# Patient Record
Sex: FEMALE | Race: WHITE | NOT HISPANIC OR LATINO
[De-identification: names, ages, dates, MRNs, and addresses within clinical notes are randomized per-mention and may not be internally consistent; named-entity substitution may affect disease eponyms.]

---

## 2017-08-17 ENCOUNTER — APPOINTMENT (OUTPATIENT)
Dept: MAMMOGRAPHY | Facility: IMAGING CENTER | Age: 77
End: 2017-08-17
Payer: MEDICARE

## 2017-08-17 ENCOUNTER — OUTPATIENT (OUTPATIENT)
Dept: OUTPATIENT SERVICES | Facility: HOSPITAL | Age: 77
LOS: 1 days | End: 2017-08-17
Payer: MEDICARE

## 2017-08-17 ENCOUNTER — APPOINTMENT (OUTPATIENT)
Dept: ULTRASOUND IMAGING | Facility: IMAGING CENTER | Age: 77
End: 2017-08-17
Payer: MEDICARE

## 2017-08-17 DIAGNOSIS — Z00.8 ENCOUNTER FOR OTHER GENERAL EXAMINATION: ICD-10-CM

## 2017-08-17 PROCEDURE — 77067 SCR MAMMO BI INCL CAD: CPT

## 2017-08-17 PROCEDURE — G0202: CPT | Mod: 26

## 2017-08-17 PROCEDURE — 76641 ULTRASOUND BREAST COMPLETE: CPT | Mod: 26,50

## 2017-08-17 PROCEDURE — 77063 BREAST TOMOSYNTHESIS BI: CPT | Mod: 26

## 2017-08-17 PROCEDURE — 77063 BREAST TOMOSYNTHESIS BI: CPT

## 2017-08-17 PROCEDURE — 76641 ULTRASOUND BREAST COMPLETE: CPT

## 2018-07-05 ENCOUNTER — MEDICATION RENEWAL (OUTPATIENT)
Age: 78
End: 2018-07-05

## 2018-08-17 ENCOUNTER — RECORD ABSTRACTING (OUTPATIENT)
Age: 78
End: 2018-08-17

## 2018-09-13 ENCOUNTER — OUTPATIENT (OUTPATIENT)
Dept: OUTPATIENT SERVICES | Facility: HOSPITAL | Age: 78
LOS: 1 days | End: 2018-09-13
Payer: MEDICARE

## 2018-09-13 ENCOUNTER — APPOINTMENT (OUTPATIENT)
Dept: ULTRASOUND IMAGING | Facility: IMAGING CENTER | Age: 78
End: 2018-09-13
Payer: MEDICARE

## 2018-09-13 ENCOUNTER — APPOINTMENT (OUTPATIENT)
Dept: MAMMOGRAPHY | Facility: IMAGING CENTER | Age: 78
End: 2018-09-13
Payer: MEDICARE

## 2018-09-13 DIAGNOSIS — Z00.8 ENCOUNTER FOR OTHER GENERAL EXAMINATION: ICD-10-CM

## 2018-09-13 PROCEDURE — 77067 SCR MAMMO BI INCL CAD: CPT | Mod: 26

## 2018-09-13 PROCEDURE — 76641 ULTRASOUND BREAST COMPLETE: CPT

## 2018-09-13 PROCEDURE — 76641 ULTRASOUND BREAST COMPLETE: CPT | Mod: 26,50

## 2018-09-13 PROCEDURE — 77063 BREAST TOMOSYNTHESIS BI: CPT | Mod: 26

## 2018-09-13 PROCEDURE — 77067 SCR MAMMO BI INCL CAD: CPT

## 2018-09-13 PROCEDURE — 77063 BREAST TOMOSYNTHESIS BI: CPT

## 2018-09-14 ENCOUNTER — APPOINTMENT (OUTPATIENT)
Dept: PULMONOLOGY | Facility: CLINIC | Age: 78
End: 2018-09-14

## 2018-10-24 ENCOUNTER — APPOINTMENT (OUTPATIENT)
Dept: PULMONOLOGY | Facility: CLINIC | Age: 78
End: 2018-10-24
Payer: MEDICARE

## 2018-10-24 VITALS
WEIGHT: 119 LBS | HEIGHT: 63.5 IN | TEMPERATURE: 98.2 F | SYSTOLIC BLOOD PRESSURE: 144 MMHG | OXYGEN SATURATION: 93 % | RESPIRATION RATE: 12 BRPM | HEART RATE: 85 BPM | DIASTOLIC BLOOD PRESSURE: 78 MMHG | BODY MASS INDEX: 20.82 KG/M2

## 2018-10-24 PROCEDURE — 88738 HGB QUANT TRANSCUTANEOUS: CPT

## 2018-10-24 PROCEDURE — 94727 GAS DIL/WSHOT DETER LNG VOL: CPT

## 2018-10-24 PROCEDURE — 94060 EVALUATION OF WHEEZING: CPT

## 2018-10-24 PROCEDURE — 94729 DIFFUSING CAPACITY: CPT

## 2018-10-24 PROCEDURE — 99213 OFFICE O/P EST LOW 20 MIN: CPT | Mod: 25

## 2018-10-24 RX ORDER — ALBUTEROL SULFATE 90 UG/1
108 (90 BASE) AEROSOL, METERED RESPIRATORY (INHALATION)
Qty: 1 | Refills: 5 | Status: DISCONTINUED | COMMUNITY
Start: 2018-07-05 | End: 2018-10-24

## 2018-10-25 LAB — POCT - HEMOGLOBIN (HGB), QUANTITATIVE, TRANSCUTANEOUS: 13.1

## 2018-11-27 ENCOUNTER — RX RENEWAL (OUTPATIENT)
Age: 78
End: 2018-11-27

## 2019-03-29 ENCOUNTER — APPOINTMENT (OUTPATIENT)
Dept: PULMONOLOGY | Facility: CLINIC | Age: 79
End: 2019-03-29
Payer: MEDICARE

## 2019-03-29 VITALS
SYSTOLIC BLOOD PRESSURE: 138 MMHG | OXYGEN SATURATION: 97 % | TEMPERATURE: 97.8 F | HEART RATE: 81 BPM | RESPIRATION RATE: 14 BRPM | DIASTOLIC BLOOD PRESSURE: 82 MMHG

## 2019-03-29 PROCEDURE — 99213 OFFICE O/P EST LOW 20 MIN: CPT

## 2019-03-29 NOTE — HISTORY OF PRESENT ILLNESS
[FreeTextEntry1] : Difficult with stomach flu and also with allergies. \par \par Occ cough.\par Baseline CAMARENA.\par No wheeze\par \par Rare Beta agonist use.

## 2019-03-29 NOTE — PHYSICAL EXAM
[Jugular Venous Distention Increased] : there was no jugular-venous distention [Abdomen Soft] : soft [Abdomen Tenderness] : non-tender [Abdomen Mass (___ Cm)] : no abdominal mass palpated [Nail Clubbing] : no clubbing of the fingernails [Cyanosis, Localized] : no localized cyanosis [Petechial Hemorrhages (___cm)] : no petechial hemorrhages [] : no ischemic changes [Lungs Percussion] : the lungs were normal to percussion [FreeTextEntry1] : Decreased bilat. Rare wheeze.

## 2019-03-31 LAB
ALBUMIN SERPL ELPH-MCNC: 4.1 G/DL
ALP BLD-CCNC: 92 U/L
ALT SERPL-CCNC: 25 U/L
AST SERPL-CCNC: 36 U/L
BILIRUB DIRECT SERPL-MCNC: 0.1 MG/DL
BILIRUB INDIRECT SERPL-MCNC: 0.2 MG/DL
BILIRUB SERPL-MCNC: 0.3 MG/DL
CHOLEST SERPL-MCNC: 162 MG/DL
CHOLEST/HDLC SERPL: 2.4 RATIO
CK SERPL-CCNC: 83 U/L
HDLC SERPL-MCNC: 68 MG/DL
LDLC SERPL CALC-MCNC: 84 MG/DL
PROT SERPL-MCNC: 6.6 G/DL
TRIGL SERPL-MCNC: 52 MG/DL

## 2019-07-16 ENCOUNTER — MEDICATION RENEWAL (OUTPATIENT)
Age: 79
End: 2019-07-16

## 2019-09-03 LAB
CHOLEST SERPL-MCNC: 176 MG/DL
CHOLEST/HDLC SERPL: 2.5 RATIO
HDLC SERPL-MCNC: 71 MG/DL
LDLC SERPL CALC-MCNC: 95 MG/DL
TRIGL SERPL-MCNC: 51 MG/DL

## 2019-09-17 ENCOUNTER — OUTPATIENT (OUTPATIENT)
Dept: OUTPATIENT SERVICES | Facility: HOSPITAL | Age: 79
LOS: 1 days | End: 2019-09-17
Payer: MEDICARE

## 2019-09-17 ENCOUNTER — APPOINTMENT (OUTPATIENT)
Dept: MAMMOGRAPHY | Facility: IMAGING CENTER | Age: 79
End: 2019-09-17
Payer: MEDICARE

## 2019-09-17 ENCOUNTER — APPOINTMENT (OUTPATIENT)
Dept: ULTRASOUND IMAGING | Facility: IMAGING CENTER | Age: 79
End: 2019-09-17
Payer: MEDICARE

## 2019-09-17 DIAGNOSIS — Z00.8 ENCOUNTER FOR OTHER GENERAL EXAMINATION: ICD-10-CM

## 2019-09-17 PROCEDURE — 77067 SCR MAMMO BI INCL CAD: CPT | Mod: 26

## 2019-09-17 PROCEDURE — 76641 ULTRASOUND BREAST COMPLETE: CPT | Mod: 26,50

## 2019-09-17 PROCEDURE — 77063 BREAST TOMOSYNTHESIS BI: CPT | Mod: 26

## 2019-09-17 PROCEDURE — 76641 ULTRASOUND BREAST COMPLETE: CPT

## 2019-09-17 PROCEDURE — 77067 SCR MAMMO BI INCL CAD: CPT

## 2019-09-17 PROCEDURE — 77063 BREAST TOMOSYNTHESIS BI: CPT

## 2019-10-01 ENCOUNTER — RX RENEWAL (OUTPATIENT)
Age: 79
End: 2019-10-01

## 2019-10-02 ENCOUNTER — MEDICATION RENEWAL (OUTPATIENT)
Age: 79
End: 2019-10-02

## 2019-10-02 ENCOUNTER — TRANSCRIPTION ENCOUNTER (OUTPATIENT)
Age: 79
End: 2019-10-02

## 2019-10-02 RX ORDER — ALPRAZOLAM 0.5 MG/1
0.5 TABLET ORAL
Qty: 30 | Refills: 0 | Status: ACTIVE | COMMUNITY
Start: 2019-10-02 | End: 1900-01-01

## 2019-10-04 ENCOUNTER — TRANSCRIPTION ENCOUNTER (OUTPATIENT)
Age: 79
End: 2019-10-04

## 2019-10-05 ENCOUNTER — APPOINTMENT (OUTPATIENT)
Dept: ORTHOPEDIC SURGERY | Facility: CLINIC | Age: 79
End: 2019-10-05
Payer: MEDICARE

## 2019-10-05 VITALS
WEIGHT: 118 LBS | SYSTOLIC BLOOD PRESSURE: 179 MMHG | BODY MASS INDEX: 20.91 KG/M2 | DIASTOLIC BLOOD PRESSURE: 89 MMHG | HEIGHT: 63 IN | HEART RATE: 76 BPM

## 2019-10-05 DIAGNOSIS — Z87.898 PERSONAL HISTORY OF OTHER SPECIFIED CONDITIONS: ICD-10-CM

## 2019-10-05 PROCEDURE — 99203 OFFICE O/P NEW LOW 30 MIN: CPT

## 2019-10-05 NOTE — HISTORY OF PRESENT ILLNESS
[de-identified] : This is a 79 y.o. RHD who presents with c/o left elbow pain and swelling x 6 days.  Patient does not recall injury.  She developed increasing swelling and redness at the elbow and went to Urgent Care 2 days ago.  She was put on Doxycycline, but it made nauseous.  She was given something for the nausea, but she stopped the antibiotics anyway.  She doesn't know how many pills she was given.  They helped a little.  Nonetheless, she is very trouble by the swelling at the elbow and is hoping to have it drained.

## 2019-10-05 NOTE — PHYSICAL EXAM
[Normal RUE] : Right Upper Extremity: No scars, rashes, lesions, ulcers, skin intact [Normal] : no peripheral adenopathy appreciated [de-identified] : Left elbow:\par + olecranon effusion\par + mild pitting edema and induration distal to elbow joint (patient states that is improved)\par skin intact\par + erythema\par + mildly tender to palpation\par Pain at full flexion\par FROM\par full ROM of wrist and hand with intact sensation and 2 + radial pulse\par \par Right elbow: no deformity, no swelling, NT to palpation, FROM, 5/5 motor, sensation intact to LT, 2 + radial pulse [de-identified] : no swelling, no edema, skin warm and well-perfused  [de-identified] : Normal rate, no increased respiratory effort, no use of accessory muscles, no nasal flaring  [de-identified] : deferred

## 2019-10-05 NOTE — DISCUSSION/SUMMARY
[de-identified] : The pathophysiology and anatomy were reviewed in detail with the patient, who expressed understanding. Patient will apply warm compresses to elbow, use Hibiclens soap and keep area moisturized. An Rx for minocycline was sent to her pharmacy along with an Rx for zofran.  She will take probiotics. She will see Dr. Dawkins this week for f/u in [place].  She was instructed to go to the ER for worsening sxs. All questions were answered to patient's satisfaction.

## 2019-10-07 ENCOUNTER — APPOINTMENT (OUTPATIENT)
Dept: ORTHOPEDIC SURGERY | Facility: CLINIC | Age: 79
End: 2019-10-07

## 2019-10-10 ENCOUNTER — APPOINTMENT (OUTPATIENT)
Dept: ORTHOPEDIC SURGERY | Facility: CLINIC | Age: 79
End: 2019-10-10
Payer: MEDICARE

## 2019-10-10 ENCOUNTER — LABORATORY RESULT (OUTPATIENT)
Age: 79
End: 2019-10-10

## 2019-10-10 VITALS — WEIGHT: 118 LBS | HEIGHT: 63 IN | BODY MASS INDEX: 20.91 KG/M2

## 2019-10-10 PROCEDURE — 20605 DRAIN/INJ JOINT/BURSA W/O US: CPT | Mod: LT

## 2019-10-10 PROCEDURE — 99214 OFFICE O/P EST MOD 30 MIN: CPT | Mod: 25

## 2019-10-10 NOTE — HISTORY OF PRESENT ILLNESS
[de-identified] : 79 year old female presents for an evaluation of left elbow pain that began on 9/30/2019 when she noted swelling and pain about the posterior aspect of her left elbow. She cannot attribute her pain to any specific injury or event. The patient rates her pain a 6/10 and describes an aching pain about her left elbow that is constant in nature. She notes that the swelling has mildly dissipated over time, but she continues to experience swelling. Her symptoms are exacerbated with leaning on the elbow. She has been taking minocycline.

## 2019-10-10 NOTE — END OF VISIT
[FreeTextEntry3] : All medical record entries made by the Scribe were at my, Dr. Jonnathan Dawkins, direction and personally dictated by me on 10/10/2019. I have reviewed the chart and agree that the record accurately reflects my personal performance of the history, physical exam, assessment and plan. I have also personally directed, reviewed, and agreed with the chart.

## 2019-10-10 NOTE — ADDENDUM
[FreeTextEntry1] : I, Sisi Miramontes, acted solely as a scribe for Dr. Jonnathan Dawkins on this date 10/10/2019.

## 2019-10-10 NOTE — PHYSICAL EXAM
[Normal LUE] : Left Upper Extremity: No scars, rashes, lesions, ulcers, skin intact [Normal Touch] : sensation intact for touch [Normal] : No swelling, no edema, normal pedal pulses and normal temperature [Poor Appearance] : well-appearing [Acute Distress] : not in acute distress [Obese] : not obese [de-identified] : Left Upper Extremity\par o Elbow :\par ¦ Inspection/Palpation : no tenderness, swelling with 1+ bursal effusion, no deformities\par ¦ Range of Motion : full and painless in all planes, no crepitus\par ¦ Strength : flexion and extension 5/5\par ¦ Stability : no joint instability on provocative testing\par o Muscle Bulk : no atrophy\par o Sensation : sensation intact to light touch\par o Skin : no skin lesions, no discoloration\par o Vascular Exam : no edema, no cyanosis, radial and ulnar pulses normal

## 2019-10-10 NOTE — DISCUSSION/SUMMARY
[de-identified] : The underlying pathophysiology was reviewed in great detail with the patient as well as the various treatment options, including ice, analgesics, NSAIDs, Physical therapy, steroid injections. \par \par The patient wishes to proceed with an ASPIRATION of the left elbow. A sample of aspirated fluid was sent for lab analysis. \par \par FU 1 week.

## 2019-10-17 ENCOUNTER — APPOINTMENT (OUTPATIENT)
Dept: ORTHOPEDIC SURGERY | Facility: CLINIC | Age: 79
End: 2019-10-17
Payer: MEDICARE

## 2019-10-17 PROCEDURE — 20605 DRAIN/INJ JOINT/BURSA W/O US: CPT | Mod: LT

## 2019-10-17 PROCEDURE — 99213 OFFICE O/P EST LOW 20 MIN: CPT | Mod: 25

## 2019-10-17 NOTE — ADDENDUM
[FreeTextEntry1] : I, Sisi Miramontes, acted solely as a scribe for Dr. Jonnathan Dawkins on this date 10/17/2019.

## 2019-10-17 NOTE — PROCEDURE
[de-identified] : At this point I recommended aspiration and therapeutic injection and under sterile precautions an injection of 0.25 cc 1% lidocaine with 0.25 cc of Kenalog and 0.25 cc of Dexamethasone- was placed into the olecranon bursa of the Left elbow without complication after 5 cc of bloody synovial fluid was aspirated and after several minutes the patient felt significant relief.

## 2019-10-17 NOTE — END OF VISIT
[FreeTextEntry3] : All medical record entries made by the Scribe were at my, Dr. Jonnathan Dawkins, direction and personally dictated by me on 10/17/2019. I have reviewed the chart and agree that the record accurately reflects my personal performance of the history, physical exam, assessment and plan. I have also personally directed, reviewed, and agreed with the chart.

## 2019-10-17 NOTE — DISCUSSION/SUMMARY
[de-identified] : The underlying pathophysiology was reviewed in great detail with the patient as well as the various treatment options, including ice, analgesics, NSAIDs, Physical therapy, steroid injections. \par \par The patient wishes to proceed with an ASPIRATION and INJECTION of the left elbow.\par \par FU PRN.

## 2019-10-17 NOTE — HISTORY OF PRESENT ILLNESS
[de-identified] : 79 year old female presents for an evaluation of left elbow pain that began on 9/30/2019 when she noted swelling and pain about the posterior aspect of her left elbow. She cannot attribute her pain to any specific injury or event. At her last visit on 10/10/2019 she underwent an aspiration of her left elbow which greatly alleviated her symptoms. The aspirated fluid was sent for lab analysis and the Gram stain showed no growth. The patient notes that the swelling about the posterior aspect of her left elbow has since returned and she has been experiencing sensitivity about her left elbow that is exacerbated with leaning on the elbow. She has completed her course of Minocycline.

## 2019-10-17 NOTE — PHYSICAL EXAM
[Normal Touch] : sensation intact for touch [Normal LUE] : Left Upper Extremity: No scars, rashes, lesions, ulcers, skin intact [Normal] : No swelling, no edema, normal pedal pulses and normal temperature [Poor Appearance] : well-appearing [Acute Distress] : not in acute distress [Obese] : not obese [de-identified] : Left Upper Extremity\par o Elbow :\par ¦ Inspection/Palpation : no tenderness, swelling with 1+ bursal effusion, no deformities\par ¦ Range of Motion : full and painless in all planes, no crepitus\par ¦ Strength : flexion and extension 5/5\par ¦ Stability : no joint instability on provocative testing\par o Muscle Bulk : no atrophy\par o Sensation : sensation intact to light touch\par o Skin : no skin lesions, resolved erythema about the posterior elbow\par o Vascular Exam : no edema, no cyanosis, radial and ulnar pulses normal

## 2019-10-24 ENCOUNTER — APPOINTMENT (OUTPATIENT)
Dept: ORTHOPEDIC SURGERY | Facility: CLINIC | Age: 79
End: 2019-10-24
Payer: MEDICARE

## 2019-10-24 VITALS — BODY MASS INDEX: 20.91 KG/M2 | WEIGHT: 118 LBS | HEIGHT: 63 IN

## 2019-10-24 DIAGNOSIS — M70.22 OLECRANON BURSITIS, LEFT ELBOW: ICD-10-CM

## 2019-10-24 PROCEDURE — 99213 OFFICE O/P EST LOW 20 MIN: CPT

## 2019-10-24 NOTE — ADDENDUM
[FreeTextEntry1] : I, Sisi Miramontes, acted solely as a scribe for Dr. Jonnathan Dawkins on this date 10/24/2019.

## 2019-10-24 NOTE — END OF VISIT
[FreeTextEntry3] : All medical record entries made by the Scribe were at my, Dr. Jonnathan Dawkins, direction and personally dictated by me on 10/24/2019. I have reviewed the chart and agree that the record accurately reflects my personal performance of the history, physical exam, assessment and plan. I have also personally directed, reviewed, and agreed with the chart.

## 2019-10-24 NOTE — HISTORY OF PRESENT ILLNESS
[de-identified] : 79 year old female presents for an evaluation of left elbow pain that began on 9/30/2019 when she noted swelling and pain about the posterior aspect of her left elbow. She cannot attribute her pain to any specific injury or event. At her last visit on 10/17/2019 she received an aspiration and corticosteroid injection of the left elbow which greatly alleviated her symptoms. The patient reports that the swelling about her left elbow has greatly improved, but she continues to experience moderate sensitivity upon leaning on the elbow. She has no other complaints at this time.

## 2019-10-24 NOTE — PHYSICAL EXAM
[Normal LUE] : Left Upper Extremity: No scars, rashes, lesions, ulcers, skin intact [Normal Touch] : sensation intact for touch [Normal] : No swelling, no edema, normal pedal pulses and normal temperature [Poor Appearance] : well-appearing [Acute Distress] : not in acute distress [de-identified] : Left Upper Extremity\par o Elbow :\par ¦ Inspection/Palpation : no tenderness, no swelling, no deformities\par ¦ Range of Motion : full and painless in all planes, no crepitus\par ¦ Strength : flexion and extension 5/5\par ¦ Stability : no joint instability on provocative testing\par o Muscle Bulk : no atrophy\par o Sensation : sensation intact to light touch\par o Skin : no skin lesions, resolved erythema about the posterior elbow\par o Vascular Exam : no edema, no cyanosis, radial and ulnar pulses normal  [Obese] : not obese

## 2019-10-24 NOTE — DISCUSSION/SUMMARY
[de-identified] : The underlying pathophysiology was reviewed in great detail with the patient as well as the various treatment options, including ice, analgesics, NSAIDs, Physical therapy, steroid injections. \par \par Activity modifications and restrictions were discussed. \par \par FU PRN.

## 2019-11-06 ENCOUNTER — APPOINTMENT (OUTPATIENT)
Dept: CARDIOLOGY | Facility: CLINIC | Age: 79
End: 2019-11-06
Payer: MEDICARE

## 2019-11-06 ENCOUNTER — LABORATORY RESULT (OUTPATIENT)
Age: 79
End: 2019-11-06

## 2019-11-06 ENCOUNTER — NON-APPOINTMENT (OUTPATIENT)
Age: 79
End: 2019-11-06

## 2019-11-06 VITALS
HEART RATE: 90 BPM | HEIGHT: 63 IN | WEIGHT: 118 LBS | DIASTOLIC BLOOD PRESSURE: 90 MMHG | BODY MASS INDEX: 20.91 KG/M2 | SYSTOLIC BLOOD PRESSURE: 150 MMHG | TEMPERATURE: 98.1 F | OXYGEN SATURATION: 98 %

## 2019-11-06 VITALS — DIASTOLIC BLOOD PRESSURE: 84 MMHG | SYSTOLIC BLOOD PRESSURE: 148 MMHG

## 2019-11-06 VITALS — SYSTOLIC BLOOD PRESSURE: 130 MMHG | DIASTOLIC BLOOD PRESSURE: 80 MMHG

## 2019-11-06 DIAGNOSIS — L03.119 CELLULITIS OF UNSPECIFIED PART OF LIMB: ICD-10-CM

## 2019-11-06 DIAGNOSIS — Z13.820 ENCOUNTER FOR SCREENING FOR OSTEOPOROSIS: ICD-10-CM

## 2019-11-06 DIAGNOSIS — Z23 ENCOUNTER FOR IMMUNIZATION: ICD-10-CM

## 2019-11-06 DIAGNOSIS — F41.9 ANXIETY DISORDER, UNSPECIFIED: ICD-10-CM

## 2019-11-06 DIAGNOSIS — Z12.83 ENCOUNTER FOR SCREENING FOR MALIGNANT NEOPLASM OF SKIN: ICD-10-CM

## 2019-11-06 DIAGNOSIS — D89.2 HYPERGAMMAGLOBULINEMIA, UNSPECIFIED: ICD-10-CM

## 2019-11-06 DIAGNOSIS — Z71.89 OTHER SPECIFIED COUNSELING: ICD-10-CM

## 2019-11-06 DIAGNOSIS — E78.5 HYPERLIPIDEMIA, UNSPECIFIED: ICD-10-CM

## 2019-11-06 DIAGNOSIS — I10 ESSENTIAL (PRIMARY) HYPERTENSION: ICD-10-CM

## 2019-11-06 DIAGNOSIS — Z13.228 ENCOUNTER FOR SCREENING FOR OTHER METABOLIC DISORDERS: ICD-10-CM

## 2019-11-06 DIAGNOSIS — Z00.00 ENCOUNTER FOR GENERAL ADULT MEDICAL EXAMINATION W/OUT ABNORMAL FINDINGS: ICD-10-CM

## 2019-11-06 DIAGNOSIS — J44.9 CHRONIC OBSTRUCTIVE PULMONARY DISEASE, UNSPECIFIED: ICD-10-CM

## 2019-11-06 DIAGNOSIS — Z12.39 ENCOUNTER FOR OTHER SCREENING FOR MALIGNANT NEOPLASM OF BREAST: ICD-10-CM

## 2019-11-06 DIAGNOSIS — Z12.11 ENCOUNTER FOR SCREENING FOR MALIGNANT NEOPLASM OF COLON: ICD-10-CM

## 2019-11-06 PROCEDURE — 99214 OFFICE O/P EST MOD 30 MIN: CPT

## 2019-11-06 PROCEDURE — 93000 ELECTROCARDIOGRAM COMPLETE: CPT

## 2019-11-06 RX ORDER — SPIRONOLACTONE 25 MG/1
25 TABLET ORAL
Refills: 0 | Status: ACTIVE | COMMUNITY

## 2019-11-06 RX ORDER — VERAPAMIL HYDROCHLORIDE 120 MG/1
120 TABLET ORAL AT BEDTIME
Refills: 0 | Status: ACTIVE | COMMUNITY
Start: 2019-11-06

## 2019-11-06 RX ORDER — VERAPAMIL HYDROCHLORIDE 240 MG/1
240 TABLET ORAL
Refills: 0 | Status: ACTIVE | COMMUNITY
Start: 2019-11-06

## 2019-11-06 RX ORDER — OLMESARTAN MEDOXOMIL 40 MG/1
40 TABLET, FILM COATED ORAL DAILY
Qty: 14 | Refills: 0 | Status: ACTIVE | COMMUNITY
Start: 2019-11-06

## 2019-11-06 RX ORDER — ZOLPIDEM TARTRATE 5 MG/1
5 TABLET ORAL
Qty: 30 | Refills: 0 | Status: ACTIVE | COMMUNITY
Start: 2019-11-06 | End: 1900-01-01

## 2019-11-06 NOTE — HISTORY OF PRESENT ILLNESS
[FreeTextEntry1] : Annual Wellness Exam  [de-identified] : This is a 79 year old lady with a PMH of HTN, HLD, Asthma and Anxiety presents today for her annual wellness exam and to establish care at our office. Patient states that she has been under a significant amount of stress as her  has passed away within the year, otherwise patient states that she is good and has no complaints at this time. Patient was diagnosed with Cellulitis in the arm and has been being treated by Orthopedics. Patient is s/p Antibiotics and is feeling good. Patient denies dyspnea, palpitations, chest pain, nausea, vomiting, dizziness and lightheadedness.\par

## 2019-11-17 LAB
25(OH)D3 SERPL-MCNC: 49.4 NG/ML
ALBUMIN MFR SERPL ELPH: 57.4 %
ALBUMIN SERPL-MCNC: 4 G/DL
ALBUMIN/GLOB SERPL: 1.4 RATIO
ALPHA1 GLOB MFR SERPL ELPH: 4.4 %
ALPHA1 GLOB SERPL ELPH-MCNC: 0.3 G/DL
ALPHA2 GLOB MFR SERPL ELPH: 13 %
ALPHA2 GLOB SERPL ELPH-MCNC: 0.9 G/DL
ANION GAP SERPL CALC-SCNC: 11 MMOL/L
APPEARANCE: ABNORMAL
B-GLOBULIN MFR SERPL ELPH: 12 %
B-GLOBULIN SERPL ELPH-MCNC: 0.8 G/DL
BACTERIA: ABNORMAL
BASOPHILS # BLD AUTO: 0.03 K/UL
BASOPHILS NFR BLD AUTO: 0.5 %
BILIRUBIN URINE: NEGATIVE
BLOOD URINE: NEGATIVE
BUN SERPL-MCNC: 34 MG/DL
CALCIUM SERPL-MCNC: 10.2 MG/DL
CHLORIDE SERPL-SCNC: 101 MMOL/L
CHOLEST SERPL-MCNC: 179 MG/DL
CHOLEST/HDLC SERPL: 2.5 RATIO
CO2 SERPL-SCNC: 26 MMOL/L
COLOR: ABNORMAL
CREAT SERPL-MCNC: 1.49 MG/DL
CREAT SPEC-SCNC: 169 MG/DL
EOSINOPHIL # BLD AUTO: 0.2 K/UL
EOSINOPHIL NFR BLD AUTO: 3.3 %
ESTIMATED AVERAGE GLUCOSE: 105 MG/DL
GAMMA GLOB FLD ELPH-MCNC: 0.9 G/DL
GAMMA GLOB MFR SERPL ELPH: 13.2 %
GLUCOSE QUALITATIVE U: NEGATIVE
GLUCOSE SERPL-MCNC: 95 MG/DL
HBA1C MFR BLD HPLC: 5.3 %
HCT VFR BLD CALC: 42.6 %
HDLC SERPL-MCNC: 71 MG/DL
HGB BLD-MCNC: 13.5 G/DL
HYALINE CASTS: 0 /LPF
IGA 24H UR QL IFE: NORMAL
IMM GRANULOCYTES NFR BLD AUTO: 0.2 %
INTERPRETATION SERPL IEP-IMP: NORMAL
KETONES URINE: NEGATIVE
LDLC SERPL CALC-MCNC: 95 MG/DL
LEUKOCYTE ESTERASE URINE: NEGATIVE
LYMPHOCYTES # BLD AUTO: 0.9 K/UL
LYMPHOCYTES NFR BLD AUTO: 14.9 %
M PROTEIN MFR SERPL ELPH: 8.7 %
M PROTEIN SPEC IFE-MCNC: NORMAL
MAGNESIUM SERPL-MCNC: 2.3 MG/DL
MAN DIFF?: NORMAL
MCHC RBC-ENTMCNC: 30.8 PG
MCHC RBC-ENTMCNC: 31.7 GM/DL
MCV RBC AUTO: 97.3 FL
MICROALBUMIN 24H UR DL<=1MG/L-MCNC: 3.5 MG/DL
MICROALBUMIN/CREAT 24H UR-RTO: 21 MG/G
MICROSCOPIC-UA: NORMAL
MONOCLON BAND OBS SERPL: 0.6 G/DL
MONOCYTES # BLD AUTO: 0.63 K/UL
MONOCYTES NFR BLD AUTO: 10.4 %
NEUTROPHILS # BLD AUTO: 4.26 K/UL
NEUTROPHILS NFR BLD AUTO: 70.7 %
NITRITE URINE: NEGATIVE
PH URINE: 5.5
PHOSPHATE SERPL-MCNC: 3.4 MG/DL
PLATELET # BLD AUTO: 208 K/UL
POTASSIUM SERPL-SCNC: 5.3 MMOL/L
PROT SERPL-MCNC: 6.9 G/DL
PROT SERPL-MCNC: 6.9 G/DL
PROTEIN URINE: NORMAL
RBC # BLD: 4.38 M/UL
RBC # FLD: 14.5 %
RED BLOOD CELLS URINE: 2 /HPF
SODIUM SERPL-SCNC: 138 MMOL/L
SPECIFIC GRAVITY URINE: 1.02
SQUAMOUS EPITHELIAL CELLS: 5 /HPF
T3RU NFR SERPL: 0.9 TBI
T4 FREE SERPL-MCNC: 1.3 NG/DL
T4 SERPL-MCNC: 8.4 UG/DL
TRIGL SERPL-MCNC: 63 MG/DL
TSH SERPL-ACNC: 1.78 UIU/ML
URATE SERPL-MCNC: 5.7 MG/DL
UROBILINOGEN URINE: NORMAL
WBC # FLD AUTO: 6.03 K/UL
WHITE BLOOD CELLS URINE: 5 /HPF

## 2019-11-19 ENCOUNTER — RX RENEWAL (OUTPATIENT)
Age: 79
End: 2019-11-19

## 2019-11-22 ENCOUNTER — APPOINTMENT (OUTPATIENT)
Dept: PULMONOLOGY | Facility: CLINIC | Age: 79
End: 2019-11-22

## 2019-11-22 ENCOUNTER — APPOINTMENT (OUTPATIENT)
Dept: PULMONOLOGY | Facility: CLINIC | Age: 79
End: 2019-11-22
Payer: MEDICARE

## 2019-11-22 VITALS
SYSTOLIC BLOOD PRESSURE: 146 MMHG | RESPIRATION RATE: 16 BRPM | TEMPERATURE: 98 F | HEART RATE: 77 BPM | HEIGHT: 63 IN | BODY MASS INDEX: 20.91 KG/M2 | WEIGHT: 118 LBS | OXYGEN SATURATION: 97 % | DIASTOLIC BLOOD PRESSURE: 80 MMHG

## 2019-11-22 DIAGNOSIS — J44.9 CHRONIC OBSTRUCTIVE PULMONARY DISEASE, UNSPECIFIED: ICD-10-CM

## 2019-11-22 PROCEDURE — 99213 OFFICE O/P EST LOW 20 MIN: CPT

## 2019-11-22 NOTE — HISTORY OF PRESENT ILLNESS
[FreeTextEntry1] : moving to [place] got flu shot\par \par rare cough. Breo prn\par Baseline CAMARENA.\par No wheeze\par \par Rare Beta agonist use.

## 2019-11-22 NOTE — PHYSICAL EXAM
[Jugular Venous Distention Increased] : there was no jugular-venous distention [Lungs Percussion] : the lungs were normal to percussion [Abdomen Soft] : soft [Abdomen Tenderness] : non-tender [Abdomen Mass (___ Cm)] : no abdominal mass palpated [Nail Clubbing] : no clubbing of the fingernails [Cyanosis, Localized] : no localized cyanosis [Petechial Hemorrhages (___cm)] : no petechial hemorrhages [] : no ischemic changes [FreeTextEntry1] : Decreased bilat. No wheeze.

## 2019-12-13 ENCOUNTER — MEDICATION RENEWAL (OUTPATIENT)
Age: 79
End: 2019-12-13

## 2019-12-13 RX ORDER — PITAVASTATIN CALCIUM 1.04 MG/1
1 TABLET, FILM COATED ORAL DAILY
Qty: 90 | Refills: 3 | Status: ACTIVE | COMMUNITY
Start: 2019-03-29 | End: 1900-01-01

## 2020-02-10 RX ORDER — FLUTICASONE FUROATE AND VILANTEROL TRIFENATATE 200; 25 UG/1; UG/1
200-25 POWDER RESPIRATORY (INHALATION) DAILY
Qty: 1 | Refills: 5 | Status: ACTIVE | COMMUNITY
Start: 2018-07-05 | End: 1900-01-01

## 2020-03-06 RX ORDER — MONTELUKAST 10 MG/1
10 TABLET, FILM COATED ORAL
Qty: 90 | Refills: 3 | Status: ACTIVE | COMMUNITY
Start: 2018-07-05 | End: 1900-01-01

## 2021-08-16 ENCOUNTER — RX RENEWAL (OUTPATIENT)
Age: 81
End: 2021-08-16

## 2021-08-16 RX ORDER — ALBUTEROL SULFATE 90 UG/1
108 (90 BASE) AEROSOL, METERED RESPIRATORY (INHALATION)
Qty: 1 | Refills: 4 | Status: ACTIVE | COMMUNITY
Start: 2019-03-29 | End: 1900-01-01

## 2022-01-13 NOTE — PROCEDURE
[de-identified] : At this point I recommended aspiration and under sterile precautions an injection of 2 cc 1% lidocaine -was placed into the olecranon bursa of the Left elbow without complication and bloody synovial fluid was aspirated: 5 cc total fluid removed. 
no

## 2023-05-25 ENCOUNTER — INPATIENT (INPATIENT)
Facility: HOSPITAL | Age: 83
LOS: 7 days | Discharge: HOME HEALTH | DRG: 189 | End: 2023-06-02
Attending: GENERAL ACUTE CARE HOSPITAL | Admitting: STUDENT IN AN ORGANIZED HEALTH CARE EDUCATION/TRAINING PROGRAM
Payer: MEDICARE

## 2023-05-25 VITALS
HEART RATE: 95 BPM | TEMPERATURE: 100 F | DIASTOLIC BLOOD PRESSURE: 83 MMHG | SYSTOLIC BLOOD PRESSURE: 150 MMHG | HEIGHT: 64 IN | WEIGHT: 119.93 LBS | RESPIRATION RATE: 20 BRPM | OXYGEN SATURATION: 96 %

## 2023-05-25 DIAGNOSIS — I10 ESSENTIAL (PRIMARY) HYPERTENSION: ICD-10-CM

## 2023-05-25 DIAGNOSIS — N17.9 ACUTE KIDNEY FAILURE, UNSPECIFIED: ICD-10-CM

## 2023-05-25 DIAGNOSIS — R63.8 OTHER SYMPTOMS AND SIGNS CONCERNING FOOD AND FLUID INTAKE: ICD-10-CM

## 2023-05-25 DIAGNOSIS — R41.3 OTHER AMNESIA: ICD-10-CM

## 2023-05-25 DIAGNOSIS — J43.9 EMPHYSEMA, UNSPECIFIED: ICD-10-CM

## 2023-05-25 DIAGNOSIS — J44.1 CHRONIC OBSTRUCTIVE PULMONARY DISEASE WITH (ACUTE) EXACERBATION: ICD-10-CM

## 2023-05-25 DIAGNOSIS — E78.5 HYPERLIPIDEMIA, UNSPECIFIED: ICD-10-CM

## 2023-05-25 DIAGNOSIS — Z90.710 ACQUIRED ABSENCE OF BOTH CERVIX AND UTERUS: Chronic | ICD-10-CM

## 2023-05-25 DIAGNOSIS — J45.901 UNSPECIFIED ASTHMA WITH (ACUTE) EXACERBATION: ICD-10-CM

## 2023-05-25 DIAGNOSIS — J96.01 ACUTE RESPIRATORY FAILURE WITH HYPOXIA: ICD-10-CM

## 2023-05-25 DIAGNOSIS — F32.9 MAJOR DEPRESSIVE DISORDER, SINGLE EPISODE, UNSPECIFIED: ICD-10-CM

## 2023-05-25 LAB
ANION GAP SERPL CALC-SCNC: 13 MMOL/L — SIGNIFICANT CHANGE UP (ref 5–17)
BASOPHILS # BLD AUTO: 0.04 K/UL — SIGNIFICANT CHANGE UP (ref 0–0.2)
BASOPHILS NFR BLD AUTO: 0.4 % — SIGNIFICANT CHANGE UP (ref 0–2)
BUN SERPL-MCNC: 31 MG/DL — HIGH (ref 7–23)
CALCIUM SERPL-MCNC: 9.2 MG/DL — SIGNIFICANT CHANGE UP (ref 8.4–10.5)
CHLORIDE SERPL-SCNC: 100 MMOL/L — SIGNIFICANT CHANGE UP (ref 96–108)
CO2 SERPL-SCNC: 24 MMOL/L — SIGNIFICANT CHANGE UP (ref 22–31)
CREAT SERPL-MCNC: 1.44 MG/DL — HIGH (ref 0.5–1.3)
EGFR: 37 ML/MIN/1.73M2 — LOW
EOSINOPHIL # BLD AUTO: 0.03 K/UL — SIGNIFICANT CHANGE UP (ref 0–0.5)
EOSINOPHIL NFR BLD AUTO: 0.3 % — SIGNIFICANT CHANGE UP (ref 0–6)
GLUCOSE SERPL-MCNC: 109 MG/DL — HIGH (ref 70–99)
HCT VFR BLD CALC: 42.2 % — SIGNIFICANT CHANGE UP (ref 34.5–45)
HGB BLD-MCNC: 13.6 G/DL — SIGNIFICANT CHANGE UP (ref 11.5–15.5)
IMM GRANULOCYTES NFR BLD AUTO: 0.6 % — SIGNIFICANT CHANGE UP (ref 0–0.9)
LACTATE SERPL-SCNC: 1.6 MMOL/L — SIGNIFICANT CHANGE UP (ref 0.5–2)
LYMPHOCYTES # BLD AUTO: 0.78 K/UL — LOW (ref 1–3.3)
LYMPHOCYTES # BLD AUTO: 8.7 % — LOW (ref 13–44)
MCHC RBC-ENTMCNC: 28.6 PG — SIGNIFICANT CHANGE UP (ref 27–34)
MCHC RBC-ENTMCNC: 32.2 GM/DL — SIGNIFICANT CHANGE UP (ref 32–36)
MCV RBC AUTO: 88.7 FL — SIGNIFICANT CHANGE UP (ref 80–100)
MONOCYTES # BLD AUTO: 0.98 K/UL — HIGH (ref 0–0.9)
MONOCYTES NFR BLD AUTO: 10.9 % — SIGNIFICANT CHANGE UP (ref 2–14)
NEUTROPHILS # BLD AUTO: 7.09 K/UL — SIGNIFICANT CHANGE UP (ref 1.8–7.4)
NEUTROPHILS NFR BLD AUTO: 79.1 % — HIGH (ref 43–77)
NRBC # BLD: 0 /100 WBCS — SIGNIFICANT CHANGE UP (ref 0–0)
NT-PROBNP SERPL-SCNC: 762 PG/ML — HIGH (ref 0–300)
PLATELET # BLD AUTO: 258 K/UL — SIGNIFICANT CHANGE UP (ref 150–400)
POTASSIUM SERPL-MCNC: 4 MMOL/L — SIGNIFICANT CHANGE UP (ref 3.5–5.3)
POTASSIUM SERPL-SCNC: 4 MMOL/L — SIGNIFICANT CHANGE UP (ref 3.5–5.3)
RAPID RVP RESULT: SIGNIFICANT CHANGE UP
RBC # BLD: 4.76 M/UL — SIGNIFICANT CHANGE UP (ref 3.8–5.2)
RBC # FLD: 15.3 % — HIGH (ref 10.3–14.5)
SARS-COV-2 RNA SPEC QL NAA+PROBE: SIGNIFICANT CHANGE UP
SODIUM SERPL-SCNC: 137 MMOL/L — SIGNIFICANT CHANGE UP (ref 135–145)
TROPONIN T SERPL-MCNC: 0.01 NG/ML — SIGNIFICANT CHANGE UP (ref 0–0.01)
WBC # BLD: 8.97 K/UL — SIGNIFICANT CHANGE UP (ref 3.8–10.5)
WBC # FLD AUTO: 8.97 K/UL — SIGNIFICANT CHANGE UP (ref 3.8–10.5)

## 2023-05-25 PROCEDURE — 99285 EMERGENCY DEPT VISIT HI MDM: CPT

## 2023-05-25 PROCEDURE — 71045 X-RAY EXAM CHEST 1 VIEW: CPT | Mod: 26

## 2023-05-25 PROCEDURE — 99223 1ST HOSP IP/OBS HIGH 75: CPT | Mod: GC

## 2023-05-25 RX ORDER — SERTRALINE 25 MG/1
50 TABLET, FILM COATED ORAL DAILY
Refills: 0 | Status: DISCONTINUED | OUTPATIENT
Start: 2023-05-25 | End: 2023-06-02

## 2023-05-25 RX ORDER — VERAPAMIL HCL 240 MG
120 CAPSULE, EXTENDED RELEASE PELLETS 24 HR ORAL EVERY 24 HOURS
Refills: 0 | Status: DISCONTINUED | OUTPATIENT
Start: 2023-05-25 | End: 2023-05-30

## 2023-05-25 RX ORDER — IPRATROPIUM/ALBUTEROL SULFATE 18-103MCG
3 AEROSOL WITH ADAPTER (GRAM) INHALATION EVERY 6 HOURS
Refills: 0 | Status: DISCONTINUED | OUTPATIENT
Start: 2023-05-25 | End: 2023-05-29

## 2023-05-25 RX ORDER — VERAPAMIL HCL 240 MG
240 CAPSULE, EXTENDED RELEASE PELLETS 24 HR ORAL EVERY 24 HOURS
Refills: 0 | Status: DISCONTINUED | OUTPATIENT
Start: 2023-05-26 | End: 2023-05-29

## 2023-05-25 RX ORDER — MAGNESIUM SULFATE 500 MG/ML
2 VIAL (ML) INJECTION ONCE
Refills: 0 | Status: COMPLETED | OUTPATIENT
Start: 2023-05-25 | End: 2023-05-25

## 2023-05-25 RX ORDER — SODIUM CHLORIDE 9 MG/ML
500 INJECTION INTRAMUSCULAR; INTRAVENOUS; SUBCUTANEOUS ONCE
Refills: 0 | Status: COMPLETED | OUTPATIENT
Start: 2023-05-25 | End: 2023-05-25

## 2023-05-25 RX ORDER — IPRATROPIUM/ALBUTEROL SULFATE 18-103MCG
3 AEROSOL WITH ADAPTER (GRAM) INHALATION ONCE
Refills: 0 | Status: COMPLETED | OUTPATIENT
Start: 2023-05-25 | End: 2023-05-25

## 2023-05-25 RX ORDER — ATORVASTATIN CALCIUM 80 MG/1
40 TABLET, FILM COATED ORAL AT BEDTIME
Refills: 0 | Status: DISCONTINUED | OUTPATIENT
Start: 2023-05-25 | End: 2023-06-02

## 2023-05-25 RX ORDER — BUDESONIDE AND FORMOTEROL FUMARATE DIHYDRATE 160; 4.5 UG/1; UG/1
2 AEROSOL RESPIRATORY (INHALATION) EVERY 12 HOURS
Refills: 0 | Status: DISCONTINUED | OUTPATIENT
Start: 2023-05-25 | End: 2023-06-02

## 2023-05-25 RX ORDER — ALBUTEROL 90 UG/1
2 AEROSOL, METERED ORAL EVERY 6 HOURS
Refills: 0 | Status: DISCONTINUED | OUTPATIENT
Start: 2023-05-25 | End: 2023-06-02

## 2023-05-25 RX ORDER — IPRATROPIUM/ALBUTEROL SULFATE 18-103MCG
3 AEROSOL WITH ADAPTER (GRAM) INHALATION EVERY 6 HOURS
Refills: 0 | Status: DISCONTINUED | OUTPATIENT
Start: 2023-05-25 | End: 2023-05-25

## 2023-05-25 RX ADMIN — Medication 3 MILLILITER(S): at 16:24

## 2023-05-25 RX ADMIN — Medication 125 MILLIGRAM(S): at 16:34

## 2023-05-25 RX ADMIN — Medication 3 MILLILITER(S): at 23:12

## 2023-05-25 RX ADMIN — SODIUM CHLORIDE 500 MILLILITER(S): 9 INJECTION INTRAMUSCULAR; INTRAVENOUS; SUBCUTANEOUS at 23:05

## 2023-05-25 RX ADMIN — Medication 2 GRAM(S): at 16:34

## 2023-05-25 RX ADMIN — Medication 150 GRAM(S): at 16:24

## 2023-05-25 NOTE — ED ADULT NURSE REASSESSMENT NOTE - NS ED NURSE REASSESS COMMENT FT1
pt states she is going to leave. states, "a doctor told me she will speaking to her attending and I will be discharged." KATE called and states all residents are in code and will get back to me.

## 2023-05-25 NOTE — ED PROVIDER NOTE - OBJECTIVE STATEMENT
pmh of htn, emphysema, asthma was with aide and was becoming increasingly SOB with her aide. denies cp. denies fever, chills, sweats. similar symptoms in the past with asthma exacerbation. aide at bedside. pt speakign in short sentences

## 2023-05-25 NOTE — H&P ADULT - HISTORY OF PRESENT ILLNESS
In the ED:  Initial vital signs: T: XX F, HR: XX, BP: XX, R: XX, SpO2: XX% on RA  ED course:   Labs: significant for  Imaging:  CXR:   EKG:   Medications:   Consults: none  83 yo F with PMHx HTN, Emphysema (not on home O2), Asthma, memory loss (newly on memantine and donepzeil) presents to ED c/o SOB since yesterday but she does not remember details about what happened.  Her aid at bedside (Loren) states that this began yesterday with PT.  Loren was not there but was told that when PT was working with her, her HR and BP both increased and she was short of breath.  Today when was with the patient, she was trying to get the patient to go for a walk but noticed that was more tired and had iwob and a worsening cough (no change in sputum production noted).  She gave her a nebulizer with minimal relief and since she "didn't like how she looked", she called the ambulence.      Per ED report, EMS reported circumoral cyanosis and tachypnea to 30s and satting mid-80s on RA with temp of 100.  EMS had given her X1 duoneb.        In the ED:  Initial vital signs: T: XX F, HR: XX, BP: XX, R: XX, SpO2: XX% on RA  ED course:   Labs: significant for  Imaging:  CXR:   EKG:   Medications:   Consults: none  83 yo F with PMHx HTN, HLD, Emphysema (not on home O2), Asthma, memory loss (newly on memantine and donepzeil), depressionpresents to ED c/o SOB since yesterday but she does not remember details about what happened.  Her aid at bedside (Loren) states that this began yesterday with PT.  Loren was not there but was told that when PT was working with her, her HR and BP both increased and she was short of breath.  Today when was with the patient, she was trying to get the patient to go for a walk but noticed that was more tired and had iwob and a worsening cough (no change in sputum production noted).  She gave her a nebulizer with minimal relief and since she "didn't like how she looked", she called the ambulence.      Per ED report, EMS reported circumoral cyanosis and tachypnea to 30s and satting mid-80s on RA with temp of 100.  EMS had given her X1 duoneb.    Of note, patient is AAOX3 at time of interview but unable to recall any details surrounding the event.  Neither she nor the aid can report any triggers, including allergens or signs/symptoms of illness.  Aid reports that patient not formally diagnosed with dementia and appears to be at her baseline s/p ED initial treatment.          In the ED:  Initial vital signs: T: 100 F, HR: 95, BP: 150/83, R: 20, SpO2: 96% on RA  ED course:   Labs: significant for Cr 1.44  Imaging:  CXR: no infiltrates  EKG: sinus tach with PACs  Medications: solumedrol 125mg X1, duoneb X1, Mg 2g  Consults: none

## 2023-05-25 NOTE — H&P ADULT - NSHPSOCIALHISTORY_GEN_ALL_CORE
Has HHA 7 days a week with differing hours; primary aid is Loren with her 4 days a week for 12 hours.  Uses walker.  Former remote smoking.  No alcohol use.  Former marijuana use.

## 2023-05-25 NOTE — ED ADULT TRIAGE NOTE - CHIEF COMPLAINT QUOTE
Pt bibems from street after friend "noticing her being way more winded than usual." Endorses worsening faitgue and SOB since yetserday. EMS states circumoral cyanosis and tachypnea to 30's on arrival. Given x1 duoneb by EMS, saturation mid 80's on RA. Denies CP, oral temp 100.

## 2023-05-25 NOTE — H&P ADULT - PROBLEM/PLAN-3
DISPLAY PLAN FREE TEXT
pt presented with intermittent chest pain x 2 weeks. CXR WNL, EKG sinus arrythmia. Called [facility] for labs done and were wnl per report. Head CT also negative. Advised close follow up and cardio referral given. Pt reported feeling better and agreed to f/u. Strict return precautions advised and pt verbalized understanding.

## 2023-05-25 NOTE — H&P ADULT - NSICDXPASTMEDICALHX_GEN_ALL_CORE_FT
PAST MEDICAL HISTORY:  Asthma with COPD     HLD (hyperlipidemia)     HTN (hypertension)     Memory loss

## 2023-05-25 NOTE — H&P ADULT - NSHPOUTPATIENTPROVIDERS_GEN_ALL_CORE
Dr. Paula Aguilar ([facility])--[facility]  Dr. Layton Carroll ([facility])--[facility]

## 2023-05-25 NOTE — PATIENT PROFILE ADULT - FALL HARM RISK - HARM RISK INTERVENTIONS

## 2023-05-25 NOTE — H&P ADULT - PROBLEM SELECTOR PLAN 5
Aid reports that patient not formally diagnosed with dementia and appears to be at her baseline s/p ED initial treatment.  On donepezil and memantine, unclear dosing.  AAOX3 upon exam but unable to recall events around her sob episode.  - aid to provide dosages tomorrow

## 2023-05-25 NOTE — H&P ADULT - NSVTERISKREFERASSESS_GEN_ALL_CORE
VOICEMAIL  1. Caller Name: Nafisa                      Call Back Number: [phone]    2. Message: Patient's BP from 03/11-3/30 has been running in the 130s and 120s. Her BP yesterday was 130/62. Patient's surgeon, Dr. Thomas Rdz, is requesting a letter that it is ok for her to proceed with surgery now that her BP is under control.    3. Patient approves office to leave a detailed voicemail/MyChart message: N\A     Refer to the Assessment tab to view/cancel completed assessment.

## 2023-05-25 NOTE — ED PROVIDER NOTE - CLINICAL SUMMARY MEDICAL DECISION MAKING FREE TEXT BOX
pt here for sob, likely asthma exacerbation  pt feeling better with 3x nebs  pt given nebs, solumedrol, mag  will admit for asthma exacerbation

## 2023-05-25 NOTE — H&P ADULT - PROBLEM SELECTOR PLAN 2
Hx of asthma on trelegy and albuterol prn.  - treatment as above  - follow up with Pulmonologist, Dr. Paula Aguilar  - obtain PFTs

## 2023-05-25 NOTE — H&P ADULT - ATTENDING COMMENTS
#Acute asthma exacerbation: Likely 2/2 viral URI. +cough w/ sputum, low grade fever, CXR w/o infiltrate. Intially hypoxic, now improved ad asymptomatic s/p dounebs and steroids in ED. Mild bibasilar wheezing on exam. RVP negative. c/w dounebs, prednisone 40mg qd x5d, iss, home inhalers, monitor resp status.    #YUNI vs CKD: Previous cr 1.49 x1; unclear ckd vs yuni. Appears hypovolemic on exam 500 ccbolus x1, f/up UA/urine lytes, bladder scan, trend cr, avoid nephrotoxic agnts.

## 2023-05-25 NOTE — H&P ADULT - NSHPPHYSICALEXAM_GEN_ALL_CORE
LOS:     VITALS:   T(C): 37.8 (05-25-23 @ 16:10), Max: 37.8 (05-25-23 @ 16:10)  HR: 108 (05-25-23 @ 17:28) (95 - 114)  BP: 142/87 (05-25-23 @ 17:28) (142/87 - 159/81)  RR: 20 (05-25-23 @ 17:28) (20 - 24)  SpO2: 95% (05-25-23 @ 17:28) (92% - 98%)    GENERAL: NAD, lying in bed comfortably  HEAD:  Atraumatic, Normocephalic  EYES: EOMI, PERRLA, conjunctiva and sclera clear  ENT: Moist mucous membranes  NECK: Supple, No JVD  CHEST/LUNG: Clear to auscultation bilaterally; No rales, rhonchi, wheezing, or rubs. Unlabored respirations  HEART: Regular rate and rhythm; No murmurs, rubs, or gallops  ABDOMEN: BSx4; Soft, nontender, nondistended  EXTREMITIES:  2+ Peripheral Pulses, brisk capillary refill. No clubbing, cyanosis, or edema  NERVOUS SYSTEM:  A&Ox3, no focal deficits   SKIN: No rashes or lesions LOS:     VITALS:   T(C): 37.8 (05-25-23 @ 16:10), Max: 37.8 (05-25-23 @ 16:10)  HR: 108 (05-25-23 @ 17:28) (95 - 114)  BP: 142/87 (05-25-23 @ 17:28) (142/87 - 159/81)  RR: 20 (05-25-23 @ 17:28) (20 - 24)  SpO2: 95% (05-25-23 @ 17:28) (92% - 98%)    GENERAL: NAD, lying in bed comfortably  HEAD:  Atraumatic, Normocephalic  EYES: EOMI, PERRLA, conjunctiva and sclera clear  ENT: Moist mucous membranes  NECK: Supple  CHEST/LUNG: Clear to auscultation bilaterally; No rales, rhonchi, wheezing, or rubs. Unlabored respirations.  2L NC barely in nose.  HEART: Regular rate and rhythm; No murmurs.  ABDOMEN: BSx4; Soft, nontender, nondistended  EXTREMITIES:  No edema.  NERVOUS SYSTEM:  A&Ox3, CN 2-12 intact, 5/5 strength, no sensory loss, finger to nose intact  SKIN: No rashes or lesions

## 2023-05-25 NOTE — H&P ADULT - ASSESSMENT
81 yo F with PMHx HTN, Emphysema (not on home O2), Asthma, memory loss (newly on memantine and donepzeil) presents to ED c/o SOB X2 days of unclear etiology, now resolved.

## 2023-05-25 NOTE — ED ADULT TRIAGE NOTE - SOURCE OF INFORMATION
[facility] -- Andrés Tracey, Abelardo Ruelas Pannella, Patel, Savella  Office # [phone]    Axis 58 degrees    R Axis -20 degrees    T Axis Patient

## 2023-05-25 NOTE — ED ADULT NURSE NOTE - NSFALLHARMRISKINTERV_ED_ALL_ED
Assistance OOB with selected safe patient handling equipment if applicable/Assistance with ambulation/Communicate risk of Fall with Harm to all staff, patient, and family/Encourage patient to sit up slowly, dangle for a short time, stand at bedside before walking/Monitor gait and stability/Orthostatic vital signs/Provide visual cue: red socks, yellow wristband, yellow gown, etc/Reinforce activity limits and safety measures with patient and family/Bed in lowest position, wheels locked, appropriate side rails in place/Call bell, personal items and telephone in reach/Instruct patient to call for assistance before getting out of bed/chair/stretcher/Non-slip footwear applied when patient is off stretcher/[place] to call system/Physically safe environment - no spills, clutter or unnecessary equipment/Purposeful Proactive Rounding/Room/bathroom lighting operational, light cord in reach

## 2023-05-25 NOTE — H&P ADULT - NSHPLABSRESULTS_GEN_ALL_CORE
13.6   8.97  )-----------( 258      ( 25 May 2023 16:25 )             42.2       05-25    137  |  100  |  31<H>  ----------------------------<  109<H>  4.0   |  24  |  1.44<H>    Ca    9.2      25 May 2023 16:25                        Lactate Trend  05-25 @ 16:25 Lactate:1.6       CARDIAC MARKERS ( 25 May 2023 16:25 )  x     / 0.01 ng/mL / x     / x     / x            CAPILLARY BLOOD GLUCOSE

## 2023-05-25 NOTE — H&P ADULT - PROBLEM SELECTOR PLAN 3
Presents with Cr. 1.44 (unknown baseline).  - obtain UA and urine lytes  - obtain collateral about baseline

## 2023-05-25 NOTE — H&P ADULT - PROBLEM SELECTOR PLAN 1
Presents c/o acute sob X1 day.  EMS reported desat to 80s.  No obvious triggers.  CXR clear.  RVP and COVID negative.  - Appears to have resolved after steroids and duonebs in ED as patient currently satting 93-95% on RA  - Treat for asthma exacerbation with prednisone 40mg X5 days  - duonebs and albuterol prn  - symbicort bid

## 2023-05-25 NOTE — ED ADULT NURSE NOTE - OBJECTIVE STATEMENT
Pt reports 24 hour history of shortness of breath progressing to acute difficulty breathing this afternoon. Hx of emphysema and asthma with increasing asthma attacks recently. Pt is tachypnic with mild wheezes auscultated. No retractions or tripoding noted. Pulses strong and equal bilaterally. Pt alert and oriented on assessment.

## 2023-05-26 ENCOUNTER — TRANSCRIPTION ENCOUNTER (OUTPATIENT)
Age: 83
End: 2023-05-26

## 2023-05-26 LAB
ANION GAP SERPL CALC-SCNC: 15 MMOL/L — SIGNIFICANT CHANGE UP (ref 5–17)
ANION GAP SERPL CALC-SCNC: 15 MMOL/L — SIGNIFICANT CHANGE UP (ref 5–17)
BASOPHILS # BLD AUTO: 0 K/UL — SIGNIFICANT CHANGE UP (ref 0–0.2)
BASOPHILS NFR BLD AUTO: 0 % — SIGNIFICANT CHANGE UP (ref 0–2)
BUN SERPL-MCNC: 49 MG/DL — HIGH (ref 7–23)
BUN SERPL-MCNC: 53 MG/DL — HIGH (ref 7–23)
CALCIUM SERPL-MCNC: 9.4 MG/DL — SIGNIFICANT CHANGE UP (ref 8.4–10.5)
CALCIUM SERPL-MCNC: 9.6 MG/DL — SIGNIFICANT CHANGE UP (ref 8.4–10.5)
CHLORIDE SERPL-SCNC: 101 MMOL/L — SIGNIFICANT CHANGE UP (ref 96–108)
CHLORIDE SERPL-SCNC: 102 MMOL/L — SIGNIFICANT CHANGE UP (ref 96–108)
CO2 SERPL-SCNC: 20 MMOL/L — LOW (ref 22–31)
CO2 SERPL-SCNC: 20 MMOL/L — LOW (ref 22–31)
CREAT SERPL-MCNC: 1.79 MG/DL — HIGH (ref 0.5–1.3)
CREAT SERPL-MCNC: 2.05 MG/DL — HIGH (ref 0.5–1.3)
EGFR: 24 ML/MIN/1.73M2 — LOW
EGFR: 28 ML/MIN/1.73M2 — LOW
EOSINOPHIL # BLD AUTO: 0 K/UL — SIGNIFICANT CHANGE UP (ref 0–0.5)
EOSINOPHIL NFR BLD AUTO: 0 % — SIGNIFICANT CHANGE UP (ref 0–6)
GLUCOSE BLDC GLUCOMTR-MCNC: 170 MG/DL — HIGH (ref 70–99)
GLUCOSE BLDC GLUCOMTR-MCNC: 199 MG/DL — HIGH (ref 70–99)
GLUCOSE BLDC GLUCOMTR-MCNC: 231 MG/DL — HIGH (ref 70–99)
GLUCOSE BLDC GLUCOMTR-MCNC: 253 MG/DL — HIGH (ref 70–99)
GLUCOSE SERPL-MCNC: 234 MG/DL — HIGH (ref 70–99)
GLUCOSE SERPL-MCNC: 282 MG/DL — HIGH (ref 70–99)
HCT VFR BLD CALC: 39 % — SIGNIFICANT CHANGE UP (ref 34.5–45)
HGB BLD-MCNC: 12.6 G/DL — SIGNIFICANT CHANGE UP (ref 11.5–15.5)
IMM GRANULOCYTES NFR BLD AUTO: 0.3 % — SIGNIFICANT CHANGE UP (ref 0–0.9)
LYMPHOCYTES # BLD AUTO: 0.31 K/UL — LOW (ref 1–3.3)
LYMPHOCYTES # BLD AUTO: 7.8 % — LOW (ref 13–44)
MAGNESIUM SERPL-MCNC: 2.8 MG/DL — HIGH (ref 1.6–2.6)
MAGNESIUM SERPL-MCNC: 2.8 MG/DL — HIGH (ref 1.6–2.6)
MAGNESIUM SERPL-MCNC: 2.9 MG/DL — HIGH (ref 1.6–2.6)
MCHC RBC-ENTMCNC: 28.5 PG — SIGNIFICANT CHANGE UP (ref 27–34)
MCHC RBC-ENTMCNC: 32.3 GM/DL — SIGNIFICANT CHANGE UP (ref 32–36)
MCV RBC AUTO: 88.2 FL — SIGNIFICANT CHANGE UP (ref 80–100)
MONOCYTES # BLD AUTO: 0.09 K/UL — SIGNIFICANT CHANGE UP (ref 0–0.9)
MONOCYTES NFR BLD AUTO: 2.3 % — SIGNIFICANT CHANGE UP (ref 2–14)
NEUTROPHILS # BLD AUTO: 3.54 K/UL — SIGNIFICANT CHANGE UP (ref 1.8–7.4)
NEUTROPHILS NFR BLD AUTO: 89.6 % — HIGH (ref 43–77)
NRBC # BLD: 0 /100 WBCS — SIGNIFICANT CHANGE UP (ref 0–0)
PHOSPHATE SERPL-MCNC: 4.2 MG/DL — SIGNIFICANT CHANGE UP (ref 2.5–4.5)
PHOSPHATE SERPL-MCNC: 4.3 MG/DL — SIGNIFICANT CHANGE UP (ref 2.5–4.5)
PHOSPHATE SERPL-MCNC: 4.6 MG/DL — HIGH (ref 2.5–4.5)
PLATELET # BLD AUTO: 232 K/UL — SIGNIFICANT CHANGE UP (ref 150–400)
POTASSIUM SERPL-MCNC: 3.7 MMOL/L — SIGNIFICANT CHANGE UP (ref 3.5–5.3)
POTASSIUM SERPL-MCNC: 4.3 MMOL/L — SIGNIFICANT CHANGE UP (ref 3.5–5.3)
POTASSIUM SERPL-SCNC: 3.7 MMOL/L — SIGNIFICANT CHANGE UP (ref 3.5–5.3)
POTASSIUM SERPL-SCNC: 4.3 MMOL/L — SIGNIFICANT CHANGE UP (ref 3.5–5.3)
RBC # BLD: 4.42 M/UL — SIGNIFICANT CHANGE UP (ref 3.8–5.2)
RBC # FLD: 15.5 % — HIGH (ref 10.3–14.5)
SODIUM SERPL-SCNC: 136 MMOL/L — SIGNIFICANT CHANGE UP (ref 135–145)
SODIUM SERPL-SCNC: 137 MMOL/L — SIGNIFICANT CHANGE UP (ref 135–145)
WBC # BLD: 3.95 K/UL — SIGNIFICANT CHANGE UP (ref 3.8–10.5)
WBC # FLD AUTO: 3.95 K/UL — SIGNIFICANT CHANGE UP (ref 3.8–10.5)

## 2023-05-26 PROCEDURE — 99233 SBSQ HOSP IP/OBS HIGH 50: CPT

## 2023-05-26 PROCEDURE — 93010 ELECTROCARDIOGRAM REPORT: CPT

## 2023-05-26 RX ORDER — GLUCAGON INJECTION, SOLUTION 0.5 MG/.1ML
1 INJECTION, SOLUTION SUBCUTANEOUS ONCE
Refills: 0 | Status: DISCONTINUED | OUTPATIENT
Start: 2023-05-26 | End: 2023-06-02

## 2023-05-26 RX ORDER — IPRATROPIUM/ALBUTEROL SULFATE 18-103MCG
3 AEROSOL WITH ADAPTER (GRAM) INHALATION
Qty: 0 | Refills: 0 | DISCHARGE
Start: 2023-05-26

## 2023-05-26 RX ORDER — INSULIN LISPRO 100/ML
VIAL (ML) SUBCUTANEOUS
Refills: 0 | Status: DISCONTINUED | OUTPATIENT
Start: 2023-05-26 | End: 2023-06-02

## 2023-05-26 RX ORDER — SODIUM CHLORIDE 9 MG/ML
1000 INJECTION, SOLUTION INTRAVENOUS
Refills: 0 | Status: DISCONTINUED | OUTPATIENT
Start: 2023-05-26 | End: 2023-06-02

## 2023-05-26 RX ORDER — DEXTROSE 50 % IN WATER 50 %
25 SYRINGE (ML) INTRAVENOUS ONCE
Refills: 0 | Status: DISCONTINUED | OUTPATIENT
Start: 2023-05-26 | End: 2023-06-02

## 2023-05-26 RX ORDER — HEPARIN SODIUM 5000 [USP'U]/ML
5000 INJECTION INTRAVENOUS; SUBCUTANEOUS EVERY 8 HOURS
Refills: 0 | Status: DISCONTINUED | OUTPATIENT
Start: 2023-05-26 | End: 2023-06-02

## 2023-05-26 RX ORDER — SODIUM CHLORIDE 9 MG/ML
1000 INJECTION INTRAMUSCULAR; INTRAVENOUS; SUBCUTANEOUS
Refills: 0 | Status: DISCONTINUED | OUTPATIENT
Start: 2023-05-26 | End: 2023-05-29

## 2023-05-26 RX ORDER — DEXTROSE 50 % IN WATER 50 %
12.5 SYRINGE (ML) INTRAVENOUS ONCE
Refills: 0 | Status: DISCONTINUED | OUTPATIENT
Start: 2023-05-26 | End: 2023-06-02

## 2023-05-26 RX ORDER — SODIUM CHLORIDE 9 MG/ML
500 INJECTION INTRAMUSCULAR; INTRAVENOUS; SUBCUTANEOUS ONCE
Refills: 0 | Status: COMPLETED | OUTPATIENT
Start: 2023-05-26 | End: 2023-05-26

## 2023-05-26 RX ORDER — POTASSIUM CHLORIDE 20 MEQ
40 PACKET (EA) ORAL ONCE
Refills: 0 | Status: COMPLETED | OUTPATIENT
Start: 2023-05-26 | End: 2023-05-26

## 2023-05-26 RX ORDER — DEXTROSE 50 % IN WATER 50 %
15 SYRINGE (ML) INTRAVENOUS ONCE
Refills: 0 | Status: DISCONTINUED | OUTPATIENT
Start: 2023-05-26 | End: 2023-06-02

## 2023-05-26 RX ADMIN — Medication 40 MILLIEQUIVALENT(S): at 19:52

## 2023-05-26 RX ADMIN — BUDESONIDE AND FORMOTEROL FUMARATE DIHYDRATE 2 PUFF(S): 160; 4.5 AEROSOL RESPIRATORY (INHALATION) at 22:45

## 2023-05-26 RX ADMIN — HEPARIN SODIUM 5000 UNIT(S): 5000 INJECTION INTRAVENOUS; SUBCUTANEOUS at 22:43

## 2023-05-26 RX ADMIN — Medication 120 MILLIGRAM(S): at 22:44

## 2023-05-26 RX ADMIN — ATORVASTATIN CALCIUM 40 MILLIGRAM(S): 80 TABLET, FILM COATED ORAL at 22:44

## 2023-05-26 RX ADMIN — Medication 3 MILLILITER(S): at 05:59

## 2023-05-26 RX ADMIN — Medication 3 MILLILITER(S): at 09:36

## 2023-05-26 RX ADMIN — Medication 3 MILLILITER(S): at 22:44

## 2023-05-26 RX ADMIN — Medication 1: at 09:35

## 2023-05-26 RX ADMIN — Medication 3: at 22:44

## 2023-05-26 RX ADMIN — SERTRALINE 50 MILLIGRAM(S): 25 TABLET, FILM COATED ORAL at 11:33

## 2023-05-26 RX ADMIN — Medication 1: at 18:21

## 2023-05-26 RX ADMIN — HEPARIN SODIUM 5000 UNIT(S): 5000 INJECTION INTRAVENOUS; SUBCUTANEOUS at 13:49

## 2023-05-26 RX ADMIN — Medication 2: at 13:45

## 2023-05-26 RX ADMIN — Medication 3 MILLILITER(S): at 15:37

## 2023-05-26 RX ADMIN — SODIUM CHLORIDE 85 MILLILITER(S): 9 INJECTION INTRAMUSCULAR; INTRAVENOUS; SUBCUTANEOUS at 18:33

## 2023-05-26 RX ADMIN — BUDESONIDE AND FORMOTEROL FUMARATE DIHYDRATE 2 PUFF(S): 160; 4.5 AEROSOL RESPIRATORY (INHALATION) at 11:33

## 2023-05-26 RX ADMIN — Medication 40 MILLIGRAM(S): at 15:37

## 2023-05-26 RX ADMIN — Medication 240 MILLIGRAM(S): at 06:03

## 2023-05-26 RX ADMIN — HEPARIN SODIUM 5000 UNIT(S): 5000 INJECTION INTRAVENOUS; SUBCUTANEOUS at 06:00

## 2023-05-26 RX ADMIN — SODIUM CHLORIDE 1000 MILLILITER(S): 9 INJECTION INTRAMUSCULAR; INTRAVENOUS; SUBCUTANEOUS at 15:01

## 2023-05-26 NOTE — DISCHARGE NOTE PROVIDER - HOSPITAL COURSE
#Discharge: do not delete    81 yo F with PMHx HTN, Emphysema (not on home O2), Asthma, memory loss (newly on memantine 10mg and donepezil 5mg) presents to ED c/o SOB X2 days of unclear etiology, now resolved.      #Acute respiratory failure with hypoxia  #Acute asthma exacerbation  Presents c/o acute sob X1 day.  EMS reported desat to 80s.  No obvious triggers.  CXR Right upper lobe granulomata. Left basilar focal atelectasis. Heart size within normal limits, thoracic aortic calcification. RVP and COVID negative. Appears to have resolved after steroids and duonebs in ED as patient satting 93-95% on RA. Treated for asthma exacerbation with prednisone 40mg X5 days. Duonebs and albuterol prn with Symbicort bid. No treatment with macrolides indicated at this time, given no increase in sputum volume/viscosity or sputum purulence. RA sats improved to above 95% on room air and on ambulatory sats with PT. Advised to continue using nebulizer treatments at home and finish prednisone 5 day course.   - Home Physical therapy  - C/w Trelegy  - Nebulized albuterol prn  - follow up with Pulmonologist, Dr. Paula Aguilar ([phone]; direct line [phone]) to discuss recent visit and chest Xray findings of granuloma on right upper lobe    #YUNI (acute kidney injury)  Presents with Cr. 1.44 (unknown baseline).   - follow up with PCP Dr. Cristian Carroll ([phone]).    #Emphysema/COPD.   Hx of emphysema on inhalers as above.    #Memory loss  Aid reports that patient not formally diagnosed with dementia and appears to be at her baseline s/p ED initial treatment.  On donepezil 5mg and memantine 10 mg, per aid and son.  AAOX3 upon exam but unable to recall events around her sob episode.    #Depression, major.   Hx of depression on sertraline 50mg qd.    #HTN (hypertension).   Hx of HTN on verapamil 240mg qAM and 120mg qPM.    #HLD (hyperlipidemia).   Hx of HLD on rosuvastatin 10mg qhs.    New medications: prednisone 40mg qd for 3 more days  Items to be followed outpatient: Creatinine, RUL granulomata  Discharged to: Home with home PT and 7 days HHA    Exam to be followed outpatient:   Constitutional: resting comfortably; NAD; no increased work of breathing  HEENT: NC/AT, PERRL, EOMI, anicteric sclera, MMM, no rhinorrhea  Neck: supple; no JVD or thyromegaly  Respiratory: coughing intermittently; no sputum production; +diffusely decreased lung sounds; +mild wheezing, no rhonchi or rales; no retractions  Cardiac: +S1/S2; RRR; no M/R/G  Gastrointestinal: soft, NT/ND; no rebound or guarding; +BSx4; healed surgical scar midline s/p hysterectomy; no ascites   Extremities: WWP, no clubbing or cyanosis; no peripheral edema  Musculoskeletal: NROM x4; no joint swelling, tenderness or erythema  Vascular: 2+ radial, DP/PT pulses B/L  Dermatologic: skin warm, dry and intact; no rashes, wounds, or scars  Neurologic: AAOx3, no focal deficits  Psychiatric: calm, cooperative, behaviors are appropriate 81 yo F with PMHx HTN, Emphysema (not on home O2), Asthma, memory loss (newly on memantine 10mg and donepezil 5mg) presents to ED c/o SOB X2 days of unclear etiology, now resolved.      #Acute respiratory failure with hypoxia  #Acute asthma exacerbation  Presents c/o acute sob X1 day.  EMS reported desat to 80s.  No obvious triggers.  CXR Right upper lobe granulomata. Left basilar focal atelectasis. Heart size within normal limits, thoracic aortic calcification. RVP and COVID negative. Appears to have resolved after steroids and duonebs in ED as patient satting 93-95% on RA. Treated for asthma exacerbation with prednisone 40mg X5 days. Duonebs and albuterol prn with Symbicort bid. No treatment with macrolides indicated at this time, given no increase in sputum volume/viscosity or sputum purulence. RA sats improved to above 95% on room air and on ambulatory sats with PT. Advised to continue using nebulizer treatments at home and finish prednisone 5 day course.   - Home Physical therapy  - C/w Trelegy  - Nebulized albuterol prn  - follow up with Pulmonologist, Dr. Paula Aguilar ([phone]; direct line [phone]) to discuss recent visit and chest Xray findings of granuloma on right upper lobe    #YUNI (acute kidney injury)  Presents with Cr. 1.44 (unknown baseline).   - follow up with PCP Dr. Cristian Carroll ([phone]).    #Emphysema/COPD.   Hx of emphysema on inhalers as above.    #Memory loss  Aid reports that patient not formally diagnosed with dementia and appears to be at her baseline s/p ED initial treatment.  On donepezil 5mg and memantine 10 mg, per aid and son.  AAOX3 upon exam but unable to recall events around her sob episode.    #Depression, major.   Hx of depression on sertraline 50mg qd.    #HTN (hypertension).   Hx of HTN on verapamil 240mg qAM and 120mg qPM.    #HLD (hyperlipidemia).   Hx of HLD on rosuvastatin 10mg qhs.    New medications: prednisone 40mg qd for 3 more days  Items to be followed outpatient: Creatinine, RUL granulomata  Discharged to: Home with home PT and 7 days HHA    Exam to be followed outpatient:   Constitutional: resting comfortably; NAD; no increased work of breathing  HEENT: NC/AT, PERRL, EOMI, anicteric sclera, MMM, no rhinorrhea  Neck: supple; no JVD or thyromegaly  Respiratory: coughing intermittently; no sputum production; +diffusely decreased lung sounds; +mild wheezing, no rhonchi or rales; no retractions  Cardiac: +S1/S2; RRR; no M/R/G  Gastrointestinal: soft, NT/ND; no rebound or guarding; +BSx4; healed surgical scar midline s/p hysterectomy; no ascites   Extremities: WWP, no clubbing or cyanosis; no peripheral edema  Musculoskeletal: NROM x4; no joint swelling, tenderness or erythema  Vascular: 2+ radial, DP/PT pulses B/L  Dermatologic: skin warm, dry and intact; no rashes, wounds, or scars  Neurologic: AAOx3, no focal deficits  Psychiatric: calm, cooperative, behaviors are appropriate 81 yo F with PMHx HTN, Emphysema (not on home O2), Asthma, memory loss (newly on memantine 10mg and donepezil 5mg) presents to ED c/o SOB X2 days of unclear etiology, now resolved.      #Acute respiratory failure with hypoxia  #Acute asthma exacerbation  Presents c/o acute sob X1 day.  EMS reported desat to 80s.  No obvious triggers.  CXR Right upper lobe granulomata. Left basilar focal atelectasis. Heart size within normal limits, thoracic aortic calcification. RVP and COVID negative. Appears to have resolved after steroids and duonebs in ED as patient satting 93-95% on RA. Treated for asthma exacerbation with prednisone 40mg X5 days. Duonebs and albuterol prn with Symbicort bid. No treatment with macrolides indicated at this time, given no increase in sputum volume/viscosity or sputum purulence. RA sats improved to above 95% on room air and on ambulatory sats with PT. Advised to continue using nebulizer treatments at home and finish prednisone 5 day course.   - Home Physical therapy  - C/w Trelegy  - Nebulized albuterol prn  - follow up with Pulmonologist, Dr. Paula Aguilar ([phone]; direct line [phone]) to discuss recent visit and chest Xray findings of granuloma on right upper lobe    #YUNI (acute kidney injury)  Presents with Cr. 1.44 (unknown baseline).   - follow up with PCP Dr. Cristian Carroll ([phone]).    #Emphysema/COPD.   Hx of emphysema on inhalers as above.    #Memory loss  Aid reports that patient not formally diagnosed with dementia and appears to be at her baseline s/p ED initial treatment.  On donepezil 5mg and memantine 10 mg, per aid and son.  AAOX3 upon exam but unable to recall events around her sob episode.    #Depression, major.   Hx of depression on sertraline 50mg qd.    #HTN (hypertension).   Hx of HTN on verapamil 240mg qAM and 120mg qPM.    #HLD (hyperlipidemia).   Hx of HLD on rosuvastatin 10mg qhs.    New medications: PO prednisone 40mg with slow taper: PO prednisone 40mg for 5 more days (6/2-6), followed by decrease in dose by 10 mgs every 3 days until completed, Doxycycline 100mg BID (6/1-6/7)    Items to be followed outpatient: Creatinine, RUL granulomata  Discharged to: Home with home PT and 7 days HHA    Exam to be followed outpatient:   Constitutional: resting comfortably; NAD; no increased work of breathing  HEENT: NC/AT, PERRL, EOMI, anicteric sclera, MMM, no rhinorrhea  Neck: supple; no JVD or thyromegaly  Respiratory: coughing intermittently; no sputum production; +diffusely decreased lung sounds; +mild wheezing, no rhonchi or rales; no retractions  Cardiac: +S1/S2; RRR; no M/R/G  Gastrointestinal: soft, NT/ND; no rebound or guarding; +BSx4; healed surgical scar midline s/p hysterectomy; no ascites   Extremities: WWP, no clubbing or cyanosis; no peripheral edema  Musculoskeletal: NROM x4; no joint swelling, tenderness or erythema  Vascular: 2+ radial, DP/PT pulses B/L  Dermatologic: skin warm, dry and intact; no rashes, wounds, or scars  Neurologic: AAOx3, no focal deficits  Psychiatric: calm, cooperative, behaviors are appropriate 83 yo F with PMHx HTN, Emphysema (not on home O2), Asthma, memory loss (newly on memantine 10mg and donepezil 5mg) presents to ED c/o SOB X2 days of unclear etiology, now resolved.      #Acute respiratory failure with hypoxia  #Acute asthma exacerbation    Presents c/o acute sob X1 day.  EMS reported desat to 80s.  No obvious triggers.  CXR Right upper lobe granulomata. Left basilar focal atelectasis. Heart size within normal limits, thoracic aortic calcification. RVP and COVID negative. Appears to have resolved after steroids and duonebs in ED as patient satting 93-95% on RA. Treated for asthma exacerbation with prednisone 40mg X5 days. Duonebs and albuterol prn with Symbicort bid. No treatment with macrolides indicated at this time, given no increase in sputum volume/viscosity or sputum purulence. RA sats improved to above 95% on room air and on ambulatory sats with PT. Advised to continue using nebulizer treatments at home and finish prednisone 5 day course.   - Home Physical therapy  - [employer] device  - c/w PO prednisone 40mg with slow taper: PO prednisone 40mg for 5 more days (6/2-6), followed by decrease in dose by 10 mgs every 3 days until completed  - Doxycycline 100mg BID for new 7 day course (Day 1: 6/1) - end date June 7  - C/w Trelegy  - Nebulized albuterol prn  - follow up with Pulmonologist, Dr. Paula Aguilar ([phone]; direct line [phone]) to discuss recent visit and chest Xray findings of granuloma on right upper lobe    #YUNI (acute kidney injury)  Presents with Cr. 1.44 (unknown baseline).   - follow up with PCP Dr. Cristian Carroll ([phone]).    #Emphysema/COPD.   Hx of emphysema on inhalers as above.    #Memory loss  Aid reports that patient not formally diagnosed with dementia and appears to be at her baseline s/p ED initial treatment.  On donepezil 5mg and memantine 10 mg, per aid and son.  AAOX3 upon exam but unable to recall events around her sob episode.    #Depression, major.   Hx of depression on sertraline 50mg qd.    #HTN (hypertension).   Hx of HTN on verapamil 240mg qAM and 120mg qPM.    #HLD (hyperlipidemia).   Hx of HLD on rosuvastatin 10mg qhs.    New medications: PO prednisone 40mg with slow taper: PO prednisone 40mg for 5 more days (6/2-6), followed by decrease in dose by 10 mgs every 3 days until completed, Doxycycline 100mg BID (6/1-6/7)    Items to be followed outpatient: Creatinine, RUL granulomata  Discharged to: Home with home PT and 7 days HHA    Exam to be followed outpatient:   Constitutional: resting comfortably; NAD; no increased work of breathing  HEENT: NC/AT, PERRL, EOMI, anicteric sclera, MMM, no rhinorrhea  Neck: supple; no JVD or thyromegaly  Respiratory: coughing intermittently; no sputum production; +diffusely decreased lung sounds; +mild wheezing, no rhonchi or rales; no retractions  Cardiac: +S1/S2; RRR; no M/R/G  Gastrointestinal: soft, NT/ND; no rebound or guarding; +BSx4; healed surgical scar midline s/p hysterectomy; no ascites   Extremities: WWP, no clubbing or cyanosis; no peripheral edema  Musculoskeletal: NROM x4; no joint swelling, tenderness or erythema  Vascular: 2+ radial, DP/PT pulses B/L  Dermatologic: skin warm, dry and intact; no rashes, wounds, or scars  Neurologic: AAOx3, no focal deficits  Psychiatric: calm, cooperative, behaviors are appropriate

## 2023-05-26 NOTE — PHYSICAL THERAPY INITIAL EVALUATION ADULT - PLANNED THERAPY INTERVENTIONS, PT EVAL
balance training/bed mobility training/gait training/manual therapy techniques/motor coordination training/neuromuscular re-education/strengthening/transfer training

## 2023-05-26 NOTE — DISCHARGE NOTE PROVIDER - NSDCMRMEDTOKEN_GEN_ALL_CORE_FT
albuterol 90 mcg/inh inhalation aerosol: 2 puff(s) inhaled every 6 hours as needed for  bronchospasm  ipratropium-albuterol 0.5 mg-2.5 mg/3 mL inhalation solution: 3 milliliter(s) inhaled every 6 hours  predniSONE 20 mg oral tablet: 2 tab(s) orally every 24 hours Take 2 tablets (40mg total) once a day in the morning after breakfast, starting on 5/27/23 until 5/29/23.  rosuvastatin 10 mg oral capsule: 1 orally once a day (at bedtime)  sertraline 50 mg oral tablet: 1 orally once a day  Trelegy Ellipta 100 mcg-62.5 mcg-25 mcg/inh inhalation powder: 1 inhaled once a day  verapamil 120 mg oral tablet: 1 orally once a day (at bedtime)  verapamil 240 mg/12 hours oral tablet, extended release: 1 orally once a day   albuterol 90 mcg/inh inhalation aerosol: 2 puff(s) inhaled every 6 hours as needed for  bronchospasm  donepezil 5 mg oral tablet: 1 orally once a day  doxycycline hyclate 100 mg oral tablet: 1 tab(s) orally 2 times a day  memantine 10 mg oral tablet: 1 orally 2 times a day  predniSONE 10 mg oral tablet: 1 tab(s) orally Take 40mg (4 tablets) for 5 days (Monae 3 - 7). Then take 30mg (3 tablets) for 3 days (Monae 8 - 10). Then take 20mg (2 tablets) for 3 days (June 11 - 13). Then take 10mg (1 tablet) for 3 days (June 14 - 16).  rosuvastatin 10 mg oral capsule: 1 orally once a day (at bedtime)  sertraline 50 mg oral tablet: 1 orally once a day  Trelegy Ellipta 100 mcg-62.5 mcg-25 mcg/inh inhalation powder: 1 inhaled once a day  verapamil 120 mg/12 hours oral tablet, extended release: 1 orally once a day (at bedtime)  verapamil 240 mg/12 hours oral tablet, extended release: 1 orally once a day   albuterol 90 mcg/inh inhalation aerosol: 2 puff(s) inhaled every 6 hours as needed for  bronchospasm  donepezil 5 mg oral tablet: 1 orally once a day  doxycycline hyclate 100 mg oral tablet: 1 tab(s) orally 2 times a day  memantine 10 mg oral tablet: 1 orally 2 times a day  predniSONE 10 mg oral tablet: 1 tab(s) orally once a day Take 40mg (4 tablets) for 5 days (Monae 3 - 7). Then take 30mg (3 tablets) for 3 days (Monae 8 - 10). Then take 20mg (2 tablets) for 3 days (June 11 - 13). Then take 10mg (1 tablet) for 3 days (June 14 - 16).  rosuvastatin 10 mg oral capsule: 1 orally once a day (at bedtime)  sertraline 50 mg oral tablet: 1 orally once a day  Trelegy Ellipta 100 mcg-62.5 mcg-25 mcg/inh inhalation powder: 1 inhaled once a day  verapamil 120 mg/12 hours oral tablet, extended release: 1 orally once a day (at bedtime)  verapamil 240 mg/12 hours oral tablet, extended release: 1 orally once a day

## 2023-05-26 NOTE — DISCHARGE NOTE PROVIDER - CARE PROVIDER_API CALL
MADISON MORA  [address]  [place] [address]  Phone: ()-  Fax: ()-  Established Patient  Follow Up Time: 1 week    Cristian Carroll  Internal Medicine  [address] [place] [id number]  Phone: [phone]  Fax: ()-  Established Patient  Follow Up Time: 1 week   MADISON MORA  [address]  [place] [address]  Phone: ()-  Fax: ()-  Established Patient  Follow Up Time: 1 week    ISAMAR COOK  [address] [place] [address]  Phone: [phone]  Fax: ()-  Established Patient  Follow Up Time: 1 week   MADISON MORA  [address]  [place] [address]  Phone: ()-  Fax: ()-  Established Patient  Scheduled Appointment: 06/20/2023 01:00 PM    ISAMAR COOK  [address] [place] [address]  Phone: [phone]  Fax: ()-  Established Patient  Follow Up Time: 1 week

## 2023-05-26 NOTE — PROGRESS NOTE ADULT - PROBLEM SELECTOR PLAN 2
Hx of asthma on trelegy and albuterol prn.  - treatment as above  - follow up with Pulmonologist, Dr. Paula Aguilar  - obtain PFTs Hx of asthma on Trelegy and albuterol prn.  - treatment as above  - follow up with Pulmonologist, Dr. Paula Aguilar ([phone])  - obtain PFTs  - no treatment with macrolides indicated at this time, given no increase in sputum volume/viscosity or sputum purulence Hx of asthma on Trelegy and albuterol prn.  - treatment as above  - follow up with Pulmonologist, Dr. Paula Aguilar ([phone]) to discuss recent visit   - obtain PFTs  - no treatment with macrolides indicated at this time, given no increase in sputum volume/viscosity or sputum purulence Hx of asthma on Trelegy and albuterol prn.  - treatment as above  - follow up with Pulmonologist, Dr. Paula Aguilar ([phone]; direct line [phone]) to discuss recent visit   - obtain PFTs  - no treatment with macrolides indicated at this time, given no increase in sputum volume/viscosity or sputum purulence

## 2023-05-26 NOTE — DISCHARGE NOTE PROVIDER - PROVIDER TOKENS
PROVIDER:[TOKEN:[[id number]],FOLLOWUP:[1 week],ESTABLISHEDPATIENT:[T]],PROVIDER:[TOKEN:[[id number]],FOLLOWUP:[1 week],ESTABLISHEDPATIENT:[T]] PROVIDER:[TOKEN:[[id number]],SCHEDULEDAPPT:[06/20/2023],SCHEDULEDAPPTTIME:[01:00 PM],ESTABLISHEDPATIENT:[T]],PROVIDER:[TOKEN:[[id number]],FOLLOWUP:[1 week],ESTABLISHEDPATIENT:[T]]

## 2023-05-26 NOTE — PROGRESS NOTE ADULT - ASSESSMENT
81 yo F with PMHx HTN, Emphysema (not on home O2), Asthma, memory loss (newly on memantine and donepzeil) presents to ED c/o SOB X2 days of unclear etiology, now resolved. 83 yo F with PMHx HTN, Emphysema (not on home O2), Asthma, memory loss (newly on memantine 10mg and donepezil 5mg) presents to ED c/o SOB X2 days of unclear etiology, now resolved.

## 2023-05-26 NOTE — PHYSICAL THERAPY INITIAL EVALUATION ADULT - IMPAIRMENTS FOUND, PT EVAL
aerobic capacity/endurance/ergonomics and body mechanics/gait, locomotion, and balance/gross motor/joint integrity and mobility/muscle strength/posture

## 2023-05-26 NOTE — CONSULT NOTE ADULT - ASSESSMENT
IM    82 y o F with PMHx HTN, Emphysema (not on home O2), Asthma, memory loss (newly on memantine and donepzeil) presents to ED c/o SOB X2 days of unclear etiology, now resolved.    Problem/Plan - 1:  ·  Problem: Acute respiratory failure with hypoxia.   ·  Plan: Presents c/o acute sob X1 day.  EMS reported desat to 80s.  No obvious triggers.  CXR clear.  RVP and COVID negative.  - Appears to have resolved after steroids and duonebs in ED as patient currently satting 93-95% on RA  - Treat for asthma exacerbation with prednisone 40mg X5 days  - duonebs and albuterol prn  - Symbicort bid.    Problem/Plan - 2:  ·  Problem: Acute asthma exacerbation.   ·  Plan: Hx of asthma on trelegy and albuterol prn.  - treatment as above  - follow up with Pulmonologist, Dr. Paula Aguilar  - obtain PFTs.    Problem/Plan - 3:  ·  Problem: YUNI (acute kidney injury).   ·  Plan: Presents with Cr. 1.44 (unknown baseline).  - obtain UA and urine lytes  - obtain collateral about baseline.    Problem/Plan - 4:  ·  Problem: Emphysema/COPD.   ·  Plan: Hx of emphysema on inhalers as above.    Problem/Plan - 5:  ·  Problem: Memory loss.   ·  Plan: Aid reports that patient not formally diagnosed with dementia and appears to be at her baseline s/p ED initial treatment.  On donepezil and memantine, unclear dosing.  AAOX3 upon exam but unable to recall events around her sob episode.  - aid to provide dosages tomorrow.    Problem/Plan - 6:  ·  Problem: Depression, major.   ·  Plan: Hx of depression on sertraline 50mg qd.  - c/w home med.    Problem/Plan - 7:  ·  Problem: HTN (hypertension).   ·  Plan: Hx of HTN on verapamil 240mg qAM and 120mg qPM  - c/w home meds.    Problem/Plan - 8:  ·  Problem: HLD (hyperlipidemia).   ·  Plan: Hx of HLD on rosuvastatin 10mg qhs.  - c/w atorvastatin therapeutic interchange.    Problem/Plan - 9:  ·  Problem: Nutrition, metabolism, and development symptoms.   ·  Plan: F: none  E: replete prn  N: DASH  DVT proph: IMPROVE 1.

## 2023-05-26 NOTE — DISCHARGE NOTE PROVIDER - NSDCCPTREATMENT_GEN_ALL_CORE_FT
PRINCIPAL PROCEDURE  Procedure: Chest x-ray, PA and lateral  Findings and Treatment: Right upper lobe granulomata. Left basilar focal atelectasis. Heart size within normal limits, thoracic aortic calcification. Thoracic degenerative changes.

## 2023-05-26 NOTE — PROGRESS NOTE ADULT - PROBLEM SELECTOR PLAN 3
Presents with Cr. 1.44 (unknown baseline).  - obtain UA and urine lytes  - obtain collateral about baseline Presents with Cr. 1.44 (unknown baseline).  - obtain UA and urine lytes  - obtain collateral about baseline  - follow up with PCP Dr. Cristian Carroll ([phone]) Presents with Cr. 1.44. Baseline is 1.24 with BUN 32, as seen on last labs done outpatient by PCP on May 2022. Currently looks like prerenal i/s/o dehydration with BUN/Cr ratio >20.   - Give NS 500cc bolus  - Obtain urine lytes  - Repeat BMP in evening  - If improving, can follow up with PCP Dr. Cristian Carroll

## 2023-05-26 NOTE — DISCHARGE NOTE PROVIDER - NSDCCPCAREPLAN_GEN_ALL_CORE_FT
PRINCIPAL DISCHARGE DIAGNOSIS  Diagnosis: Acute asthma exacerbation  Assessment and Plan of Treatment: Asthma is a condition that can make it hard to breathe. Asthma does not always cause symptoms. But when symptoms occur, they can be scary. Asthma attacks happen when the airways in the lungs become narrow and inflamed. Asthma symptoms can include wheezing or noisy breathing, coughing, a tight feeling in the chest and shortness of breath. You can help prevent your asthma symptoms by staying away from things that may cause your symptoms or make them worse. Some common triggers include dust, mold, pets, pollen, cigarette smoke, getting sick with a cold or flu and stress. It is important that you take your asthma medications as they are prescribe to prevent further exacerbations and help with symptoms during an asthma attack. You should see your doctor if you have an asthma attack and the symptoms do not improve or get worse after using a quick-relief medicine. If the symptoms are severe, call for an ambulance.  INSTRUCTIONS:  - Start prednisone 40mg (2 tablets) once a day for 3 more days  - Continue Trelegy inhaler as prescribed  - Continue Albuterol 2 puffs or Nebulized Albuterol every 4-6 hours as needed with wheezing or shortness of breath  - Please be sure to follow up regarding the granuloma on your right upper lobe of the lung, found via chest Xray.   - Follow up with Dr. Paula Aguilar (pulmonology) in 1-2 weeks.  - Start home physical therapy 3x per week to improve strength.      SECONDARY DISCHARGE DIAGNOSES  Diagnosis: YUNI (acute kidney injury)  Assessment and Plan of Treatment: Acute kidney injury is when the kidney function worsens. Normally, the kidneys filter the blood and remove waste and excess salt and water. The word "acute" means sudden. Another term for acute kidney injury is "acute kidney failure." Acute kidney injury can have different causes. It can happen when there is less blood flow than usual to the kidneys, when the kidneys get damaged (from infections, cancer, certain medications, or autoimmune conditions), or when the path for urine to leave the body is blocked (a common example is prostate enlargement in men). Some people do not have any symptoms at first. People who are in the hospital might learn that they have acute kidney injury after they have blood tests for another reason. When people do have symptoms, the symptoms can include: urinating less or not at all, blood in the urine, swelling in the legs, vomiting, feeling weak, or acting confused. You had an acute kidney injury most likely due to dehydration. Your kidney function showed Creatinine of 1.44 during this hospital admission.  INSTRUCTIONS:  - Stay hydrated and drink water ~8 cups per day.  - Follow up with Dr. Cristian Carroll (primary care doctor) in 1 week for repeat blood work to check your kidney function.     PRINCIPAL DISCHARGE DIAGNOSIS  Diagnosis: Acute asthma exacerbation  Assessment and Plan of Treatment: Asthma is a condition that can make it hard to breathe. Asthma does not always cause symptoms. But when symptoms occur, they can be scary. Asthma attacks happen when the airways in the lungs become narrow and inflamed. Asthma symptoms can include wheezing or noisy breathing, coughing, a tight feeling in the chest and shortness of breath. You can help prevent your asthma symptoms by staying away from things that may cause your symptoms or make them worse. Some common triggers include dust, mold, pets, pollen, cigarette smoke, getting sick with a cold or flu and stress. It is important that you take your asthma medications as they are prescribe to prevent further exacerbations and help with symptoms during an asthma attack. You should see your doctor if you have an asthma attack and the symptoms do not improve or get worse after using a quick-relief medicine. If the symptoms are severe, call for an ambulance.  INSTRUCTIONS:  - Please take prednisone 40mg (4 tablets of 10mg) for 5 more days (6/3-6/7), 30 mg  (3 tablets of 10mg) for 3 days (Monae 8-10), 20 mg (2 tablets of 10mg) for 3 days (june 11-13), and 10mg for 3 days (June 14-16). You will decrease your dose by 10mg every 3 days until you complete.   - Please take Doxycycline 100mg BID (6/1-6/7)  - Continue Trelegy inhaler as prescribed  - Continue Albuterol 2 puffs or Nebulized Albuterol every 4-6 hours as needed with wheezing or shortness of breath  - Please be sure to follow up regarding the granuloma on your right upper lobe of the lung, found via chest Xray.   - Follow up with Dr. Paula Aguilar (pulmonology) in 1-2 weeks.  - Start home physical therapy 3x per week to improve strength.      SECONDARY DISCHARGE DIAGNOSES  Diagnosis: YUNI (acute kidney injury)  Assessment and Plan of Treatment: Acute kidney injury is when the kidney function worsens. Normally, the kidneys filter the blood and remove waste and excess salt and water. The word "acute" means sudden. Another term for acute kidney injury is "acute kidney failure." Acute kidney injury can have different causes. It can happen when there is less blood flow than usual to the kidneys, when the kidneys get damaged (from infections, cancer, certain medications, or autoimmune conditions), or when the path for urine to leave the body is blocked (a common example is prostate enlargement in men). Some people do not have any symptoms at first. People who are in the hospital might learn that they have acute kidney injury after they have blood tests for another reason. When people do have symptoms, the symptoms can include: urinating less or not at all, blood in the urine, swelling in the legs, vomiting, feeling weak, or acting confused. You had an acute kidney injury most likely due to dehydration. Your kidney function showed Creatinine of 1.44 during this hospital admission.  INSTRUCTIONS:  - Stay hydrated and drink water ~8 cups per day.  - Follow up with Dr. Cristian Carroll (primary care doctor) in 1 week for repeat blood work to check your kidney function.

## 2023-05-26 NOTE — PROGRESS NOTE ADULT - SUBJECTIVE AND OBJECTIVE BOX
CC: Patient is a 82y old  Female who presents with a chief complaint of sob (25 May 2023 20:18)      INTERVAL EVENTS: NITIN    SUBJECTIVE / INTERVAL HPI: Patient seen and examined at bedside.     ROS: negative unless otherwise stated above.    VITAL SIGNS:  Vital Signs Last 24 Hrs  T(C): 36.4 (26 May 2023 06:10), Max: 37.8 (25 May 2023 16:10)  T(F): 97.5 (26 May 2023 06:10), Max: 100 (25 May 2023 16:10)  HR: 74 (26 May 2023 06:10) (74 - 114)  BP: 156/92 (26 May 2023 06:10) (120/72 - 159/81)  BP(mean): 107 (25 May 2023 23:47) (107 - 107)  RR: 18 (26 May 2023 06:10) (18 - 24)  SpO2: 94% (26 May 2023 06:10) (92% - 98%)    Parameters below as of 25 May 2023 23:47  Patient On (Oxygen Delivery Method): room air            PHYSICAL EXAM:  Constitutional: resting comfortably; NAD  HEENT: NC/AT, PERRL, EOMI, anicteric sclera, MMM  Neck: supple; no JVD or thyromegaly  Respiratory: CTA B/L; no W/R/R, no retractions  Cardiac: +S1/S2; RRR; no M/R/G  Gastrointestinal: soft, NT/ND; no rebound or guarding; +BSx4  Extremities: WWP, no clubbing or cyanosis; no peripheral edema  Musculoskeletal: NROM x4; no joint swelling, tenderness or erythema  Vascular: 2+ radial, DP/PT pulses B/L  Dermatologic: skin warm, dry and intact; no rashes, wounds, or scars  Neurologic: AAOx3, no focal deficits  Psychiatric: calm, cooperative, behaviors are appropriate, denies SI/HI    MEDICATIONS:  MEDICATIONS  (STANDING):  albuterol/ipratropium for Nebulization 3 milliLiter(s) Nebulizer every 6 hours  atorvastatin 40 milliGRAM(s) Oral at bedtime  budesonide  80 MICROgram(s)/formoterol 4.5 MICROgram(s) Inhaler 2 Puff(s) Inhalation every 12 hours  dextrose 5%. 1000 milliLiter(s) (50 mL/Hr) IV Continuous <Continuous>  dextrose 5%. 1000 milliLiter(s) (100 mL/Hr) IV Continuous <Continuous>  dextrose 50% Injectable 25 Gram(s) IV Push once  dextrose 50% Injectable 12.5 Gram(s) IV Push once  dextrose 50% Injectable 25 Gram(s) IV Push once  glucagon  Injectable 1 milliGRAM(s) IntraMuscular once  heparin   Injectable 5000 Unit(s) SubCutaneous every 8 hours  insulin lispro (ADMELOG) corrective regimen sliding scale   SubCutaneous Before meals and at bedtime  predniSONE   Tablet 40 milliGRAM(s) Oral every 24 hours  sertraline 50 milliGRAM(s) Oral daily  verapamil 120 milliGRAM(s) Oral every 24 hours  verapamil  milliGRAM(s) Oral every 24 hours    MEDICATIONS  (PRN):  albuterol    90 MICROgram(s) HFA Inhaler 2 Puff(s) Inhalation every 6 hours PRN Shortness of Breath and/or Wheezing  dextrose Oral Gel 15 Gram(s) Oral once PRN Blood Glucose LESS THAN 70 milliGRAM(s)/deciliter      ALLERGIES:  Allergies    penicillin (Unknown)    Intolerances        LABS:                        12.6   3.95  )-----------( 232      ( 26 May 2023 05:30 )             39.0     05-25    137  |  100  |  31<H>  ----------------------------<  109<H>  4.0   |  24  |  1.44<H>    Ca    9.2      25 May 2023 16:25  Phos  4.6     05-26  Mg     2.9     05-26      RADIOLOGY & ADDITIONAL TESTS: Reviewed.   CC: Patient is a 82y old  Female who presents with a chief complaint of sob (25 May 2023 20:18)      INTERVAL EVENTS: NITIN    SUBJECTIVE / INTERVAL HPI: Patient seen and examined at bedside.     ROS: negative unless otherwise stated above.    VITAL SIGNS:  Vital Signs Last 24 Hrs  T(C): 36.4 (26 May 2023 06:10), Max: 37.8 (25 May 2023 16:10)  T(F): 97.5 (26 May 2023 06:10), Max: 100 (25 May 2023 16:10)  HR: 74 (26 May 2023 06:10) (74 - 114)  BP: 156/92 (26 May 2023 06:10) (120/72 - 159/81)  BP(mean): 107 (25 May 2023 23:47) (107 - 107)  RR: 18 (26 May 2023 06:10) (18 - 24)  SpO2: 94% (26 May 2023 06:10) (92% - 98%)    Parameters below as of 25 May 2023 23:47  Patient On (Oxygen Delivery Method): room air            PHYSICAL EXAM:  Constitutional: resting comfortably; NAD; no increased work of breathing  HEENT: NC/AT, PERRL, EOMI, anicteric sclera, MMM, no rhinorrhea  Neck: supple; no JVD or thyromegaly  Respiratory: diffusely decreased lung sounds; bilateral expiratory wheeze more pronounced on left; no rhonchi or rales; no retractions  Cardiac: +S1/S2; RRR; no M/R/G  Gastrointestinal: soft, NT/ND; no rebound or guarding; +BSx4; healed surgical scar midline s/p hysterectomy; no ascites   Extremities: WWP, no clubbing or cyanosis; no peripheral edema  Musculoskeletal: NROM x4; no joint swelling, tenderness or erythema  Vascular: 2+ radial, DP/PT pulses B/L  Dermatologic: skin warm, dry and intact; no rashes, wounds, or scars  Neurologic: AAOx3, no focal deficits  Psychiatric: calm, cooperative, behaviors are appropriate, denies SI/HI    MEDICATIONS:  MEDICATIONS  (STANDING):  albuterol/ipratropium for Nebulization 3 milliLiter(s) Nebulizer every 6 hours  atorvastatin 40 milliGRAM(s) Oral at bedtime  budesonide  80 MICROgram(s)/formoterol 4.5 MICROgram(s) Inhaler 2 Puff(s) Inhalation every 12 hours  dextrose 5%. 1000 milliLiter(s) (50 mL/Hr) IV Continuous <Continuous>  dextrose 5%. 1000 milliLiter(s) (100 mL/Hr) IV Continuous <Continuous>  dextrose 50% Injectable 25 Gram(s) IV Push once  dextrose 50% Injectable 12.5 Gram(s) IV Push once  dextrose 50% Injectable 25 Gram(s) IV Push once  glucagon  Injectable 1 milliGRAM(s) IntraMuscular once  heparin   Injectable 5000 Unit(s) SubCutaneous every 8 hours  insulin lispro (ADMELOG) corrective regimen sliding scale   SubCutaneous Before meals and at bedtime  predniSONE   Tablet 40 milliGRAM(s) Oral every 24 hours  sertraline 50 milliGRAM(s) Oral daily  verapamil 120 milliGRAM(s) Oral every 24 hours  verapamil  milliGRAM(s) Oral every 24 hours    MEDICATIONS  (PRN):  albuterol    90 MICROgram(s) HFA Inhaler 2 Puff(s) Inhalation every 6 hours PRN Shortness of Breath and/or Wheezing  dextrose Oral Gel 15 Gram(s) Oral once PRN Blood Glucose LESS THAN 70 milliGRAM(s)/deciliter      ALLERGIES:  Allergies    penicillin (Unknown)    Intolerances        LABS:                        12.6   3.95  )-----------( 232      ( 26 May 2023 05:30 )             39.0     05-25    137  |  100  |  31<H>  ----------------------------<  109<H>  4.0   |  24  |  1.44<H>    Ca    9.2      25 May 2023 16:25  Phos  4.6     05-26  Mg     2.9     05-26      RADIOLOGY & ADDITIONAL TESTS: Reviewed.   CC: Patient is a 82y old  Female who presents with a chief complaint of sob (25 May 2023 20:18)    82 year old female with history of HTN, HLD, COPD (erythema), asthma     INTERVAL EVENTS: NITIN    SUBJECTIVE / INTERVAL HPI: Patient seen and examined at bedside.     ROS: negative unless otherwise stated above.    VITAL SIGNS:  Vital Signs Last 24 Hrs  T(C): 36.4 (26 May 2023 06:10), Max: 37.8 (25 May 2023 16:10)  T(F): 97.5 (26 May 2023 06:10), Max: 100 (25 May 2023 16:10)  HR: 74 (26 May 2023 06:10) (74 - 114)  BP: 156/92 (26 May 2023 06:10) (120/72 - 159/81)  BP(mean): 107 (25 May 2023 23:47) (107 - 107)  RR: 18 (26 May 2023 06:10) (18 - 24)  SpO2: 94% (26 May 2023 06:10) (92% - 98%)    Parameters below as of 25 May 2023 23:47  Patient On (Oxygen Delivery Method): room air            PHYSICAL EXAM:  Constitutional: resting comfortably; NAD; no increased work of breathing  HEENT: NC/AT, PERRL, EOMI, anicteric sclera, MMM, no rhinorrhea  Neck: supple; no JVD or thyromegaly  Respiratory: diffusely decreased lung sounds; mild bilateral expiratory wheeze more pronounced on left; no rhonchi or rales; no retractions  Cardiac: +S1/S2; RRR; no M/R/G  Gastrointestinal: soft, NT/ND; no rebound or guarding; +BSx4; healed surgical scar midline s/p hysterectomy; no ascites   Extremities: WWP, no clubbing or cyanosis; no peripheral edema  Musculoskeletal: NROM x4; no joint swelling, tenderness or erythema  Vascular: 2+ radial, DP/PT pulses B/L  Dermatologic: skin warm, dry and intact; no rashes, wounds, or scars  Neurologic: AAOx3, no focal deficits  Psychiatric: calm, cooperative, behaviors are appropriate, denies SI/HI    MEDICATIONS:  MEDICATIONS  (STANDING):  albuterol/ipratropium for Nebulization 3 milliLiter(s) Nebulizer every 6 hours  atorvastatin 40 milliGRAM(s) Oral at bedtime  budesonide  80 MICROgram(s)/formoterol 4.5 MICROgram(s) Inhaler 2 Puff(s) Inhalation every 12 hours  dextrose 5%. 1000 milliLiter(s) (50 mL/Hr) IV Continuous <Continuous>  dextrose 5%. 1000 milliLiter(s) (100 mL/Hr) IV Continuous <Continuous>  dextrose 50% Injectable 25 Gram(s) IV Push once  dextrose 50% Injectable 12.5 Gram(s) IV Push once  dextrose 50% Injectable 25 Gram(s) IV Push once  glucagon  Injectable 1 milliGRAM(s) IntraMuscular once  heparin   Injectable 5000 Unit(s) SubCutaneous every 8 hours  insulin lispro (ADMELOG) corrective regimen sliding scale   SubCutaneous Before meals and at bedtime  predniSONE   Tablet 40 milliGRAM(s) Oral every 24 hours  sertraline 50 milliGRAM(s) Oral daily  verapamil 120 milliGRAM(s) Oral every 24 hours  verapamil  milliGRAM(s) Oral every 24 hours    MEDICATIONS  (PRN):  albuterol    90 MICROgram(s) HFA Inhaler 2 Puff(s) Inhalation every 6 hours PRN Shortness of Breath and/or Wheezing  dextrose Oral Gel 15 Gram(s) Oral once PRN Blood Glucose LESS THAN 70 milliGRAM(s)/deciliter      ALLERGIES:  Allergies    penicillin (Unknown)    Intolerances        LABS:                        12.6   3.95  )-----------( 232      ( 26 May 2023 05:30 )             39.0     05-25    137  |  100  |  31<H>  ----------------------------<  109<H>  4.0   |  24  |  1.44<H>    Ca    9.2      25 May 2023 16:25  Phos  4.6     05-26  Mg     2.9     05-26      RADIOLOGY & ADDITIONAL TESTS: Reviewed.   CC: Patient is a 82y old  Female who presents with a chief complaint of sob (25 May 2023 20:18)    INTERVAL EVENTS: NITIN    SUBJECTIVE / INTERVAL HPI: Patient seen and examined at bedside. Pt with aid and her son, who report a recent COPD exacerbation 2 weeks ago where she was seen and discharged at [place] [facility]. She followed up with pulmonologist (Dr. Paula Aguilar at [place]; [phone]) who prescribed nebulizer and tapered dose of steroids. CXR obtained at the time with unknown results. Pt reports chronic cough with no changes to sputum volume or viscosity. No purulent sputum.     ROS: negative unless otherwise stated above.    VITAL SIGNS:  Vital Signs Last 24 Hrs  T(C): 36.4 (26 May 2023 06:10), Max: 37.8 (25 May 2023 16:10)  T(F): 97.5 (26 May 2023 06:10), Max: 100 (25 May 2023 16:10)  HR: 74 (26 May 2023 06:10) (74 - 114)  BP: 156/92 (26 May 2023 06:10) (120/72 - 159/81)  BP(mean): 107 (25 May 2023 23:47) (107 - 107)  RR: 18 (26 May 2023 06:10) (18 - 24)  SpO2: 94% (26 May 2023 06:10) (92% - 98%)  -at 11:15am, SpO2 at 92% while sitting, decreased to 86% while ambulating    Parameters below as of 25 May 2023 23:47  Patient On (Oxygen Delivery Method): room air            PHYSICAL EXAM:  Constitutional: resting comfortably; NAD; no increased work of breathing  HEENT: NC/AT, PERRL, EOMI, anicteric sclera, MMM, no rhinorrhea  Neck: supple; no JVD or thyromegaly  Respiratory: coughing intermittently; no sputum production; diffusely decreased lung sounds; mild bilateral expiratory wheeze more pronounced on left; no rhonchi or rales; no retractions  Cardiac: +S1/S2; RRR; no M/R/G  Gastrointestinal: soft, NT/ND; no rebound or guarding; +BSx4; healed surgical scar midline s/p hysterectomy; no ascites   Extremities: WWP, no clubbing or cyanosis; no peripheral edema  Musculoskeletal: NROM x4; no joint swelling, tenderness or erythema  Vascular: 2+ radial, DP/PT pulses B/L  Dermatologic: skin warm, dry and intact; no rashes, wounds, or scars  Neurologic: AAOx3, no focal deficits  Psychiatric: calm, cooperative, behaviors are appropriate, denies SI/HI    MEDICATIONS:  MEDICATIONS  (STANDING):  albuterol/ipratropium for Nebulization 3 milliLiter(s) Nebulizer every 6 hours  atorvastatin 40 milliGRAM(s) Oral at bedtime  budesonide  80 MICROgram(s)/formoterol 4.5 MICROgram(s) Inhaler 2 Puff(s) Inhalation every 12 hours  dextrose 5%. 1000 milliLiter(s) (50 mL/Hr) IV Continuous <Continuous>  dextrose 5%. 1000 milliLiter(s) (100 mL/Hr) IV Continuous <Continuous>  dextrose 50% Injectable 25 Gram(s) IV Push once  dextrose 50% Injectable 12.5 Gram(s) IV Push once  dextrose 50% Injectable 25 Gram(s) IV Push once  glucagon  Injectable 1 milliGRAM(s) IntraMuscular once  heparin   Injectable 5000 Unit(s) SubCutaneous every 8 hours  insulin lispro (ADMELOG) corrective regimen sliding scale   SubCutaneous Before meals and at bedtime  predniSONE   Tablet 40 milliGRAM(s) Oral every 24 hours  sertraline 50 milliGRAM(s) Oral daily  verapamil 120 milliGRAM(s) Oral every 24 hours  verapamil  milliGRAM(s) Oral every 24 hours    MEDICATIONS  (PRN):  albuterol    90 MICROgram(s) HFA Inhaler 2 Puff(s) Inhalation every 6 hours PRN Shortness of Breath and/or Wheezing  dextrose Oral Gel 15 Gram(s) Oral once PRN Blood Glucose LESS THAN 70 milliGRAM(s)/deciliter      ALLERGIES:  Allergies    penicillin (Unknown)    Intolerances        LABS:                        12.6   3.95  )-----------( 232      ( 26 May 2023 05:30 )             39.0     05-25    137  |  100  |  31<H>  ----------------------------<  109<H>  4.0   |  24  |  1.44<H>    Ca    9.2      25 May 2023 16:25  Phos  4.6     05-26  Mg     2.9     05-26      RADIOLOGY & ADDITIONAL TESTS: Reviewed.   CC: Patient is a 82y old  Female who presents with a chief complaint of sob (25 May 2023 20:18)    INTERVAL EVENTS: NITIN    SUBJECTIVE / INTERVAL HPI: Patient seen and examined at bedside. Pt with aid and her son, who report a recent COPD exacerbation 2 weeks ago where she was seen and discharged at [place] [facility]. She followed up with pulmonologist (Dr. Paula Aguilar at [place]; [phone]) who prescribed nebulizer and tapered dose of steroids. CXR obtained at the time with unknown results. Pt reports chronic cough with no changes to sputum volume or viscosity. No purulent sputum.     ROS: negative unless otherwise stated above.    VITAL SIGNS:  Vital Signs Last 24 Hrs  T(C): 36.4 (26 May 2023 06:10), Max: 37.8 (25 May 2023 16:10)  T(F): 97.5 (26 May 2023 06:10), Max: 100 (25 May 2023 16:10)  HR: 74 (26 May 2023 06:10) (74 - 114)  BP: 156/92 (26 May 2023 06:10) (120/72 - 159/81)  BP(mean): 107 (25 May 2023 23:47) (107 - 107)  RR: 18 (26 May 2023 06:10) (18 - 24)  SpO2: 94% (26 May 2023 06:10) (92% - 98%)  -at 11:15am, SpO2 at 92% while sitting, decreased to 86% while ambulating    Parameters below as of 25 May 2023 23:47  Patient On (Oxygen Delivery Method): room air            PHYSICAL EXAM:  Constitutional: resting comfortably; NAD; no increased work of breathing  HEENT: NC/AT, PERRL, EOMI, anicteric sclera, MMM, no rhinorrhea  Neck: supple; no JVD or thyromegaly  Respiratory: coughing intermittently; no sputum production; diffusely decreased lung sounds; mild bilateral expiratory wheeze more pronounced on left; no rhonchi or rales; no retractions  Cardiac: +S1/S2; RRR; no M/R/G  Gastrointestinal: soft, NT/ND; no rebound or guarding; +BSx4; healed surgical scar midline s/p hysterectomy; no ascites   Extremities: WWP, no clubbing or cyanosis; no peripheral edema  Musculoskeletal: NROM x4; no joint swelling, tenderness or erythema  Vascular: 2+ radial, DP/PT pulses B/L  Dermatologic: skin warm, dry and intact; no rashes, wounds, or scars  Neurologic: AAOx3, no focal deficits  Psychiatric: calm, cooperative, behaviors are appropriate, denies SI/HI    MEDICATIONS:  MEDICATIONS  (STANDING):  albuterol/ipratropium for Nebulization 3 milliLiter(s) Nebulizer every 6 hours  atorvastatin 40 milliGRAM(s) Oral at bedtime  budesonide  80 MICROgram(s)/formoterol 4.5 MICROgram(s) Inhaler 2 Puff(s) Inhalation every 12 hours  dextrose 5%. 1000 milliLiter(s) (50 mL/Hr) IV Continuous <Continuous>  dextrose 5%. 1000 milliLiter(s) (100 mL/Hr) IV Continuous <Continuous>  dextrose 50% Injectable 25 Gram(s) IV Push once  dextrose 50% Injectable 12.5 Gram(s) IV Push once  dextrose 50% Injectable 25 Gram(s) IV Push once  glucagon  Injectable 1 milliGRAM(s) IntraMuscular once  heparin   Injectable 5000 Unit(s) SubCutaneous every 8 hours  insulin lispro (ADMELOG) corrective regimen sliding scale   SubCutaneous Before meals and at bedtime  predniSONE   Tablet 40 milliGRAM(s) Oral every 24 hours  sertraline 50 milliGRAM(s) Oral daily  verapamil 120 milliGRAM(s) Oral every 24 hours  verapamil  milliGRAM(s) Oral every 24 hours    MEDICATIONS  (PRN):  albuterol    90 MICROgram(s) HFA Inhaler 2 Puff(s) Inhalation every 6 hours PRN Shortness of Breath and/or Wheezing  dextrose Oral Gel 15 Gram(s) Oral once PRN Blood Glucose LESS THAN 70 milliGRAM(s)/deciliter      ALLERGIES:  Allergies    penicillin (Unknown)    Intolerances        LABS:                        12.6   3.95  )-----------( 232      ( 26 May 2023 05:30 )             39.0     05-25    137  |  100  |  31<H>  ----------------------------<  109<H>  4.0   |  24  |  1.44<H>    Ca    9.2      25 May 2023 16:25  Phos  4.6     05-26  Mg     2.9     05-26      RADIOLOGY & ADDITIONAL TESTS: Reviewed.   CC: Patient is a 82y old  Female who presents with a chief complaint of sob (25 May 2023 20:18)    INTERVAL EVENTS: NITIN    SUBJECTIVE / INTERVAL HPI: Patient seen and examined at bedside. Pt with aid and her son, who report a recent COPD exacerbation 2 weeks ago where she was seen and discharged at [facility]. She followed up with pulmonologist (Dr. Paula Aguilar at [facility]; [phone]) who prescribed nebulizer and tapered dose of steroids. CXR obtained at the time with unknown results. Pt reports chronic cough with no changes to sputum volume or viscosity. No purulent sputum.     ROS: negative unless otherwise stated above.    VITAL SIGNS:  Vital Signs Last 24 Hrs  T(C): 36.4 (26 May 2023 06:10), Max: 37.8 (25 May 2023 16:10)  T(F): 97.5 (26 May 2023 06:10), Max: 100 (25 May 2023 16:10)  HR: 74 (26 May 2023 06:10) (74 - 114)  BP: 156/92 (26 May 2023 06:10) (120/72 - 159/81)  BP(mean): 107 (25 May 2023 23:47) (107 - 107)  RR: 18 (26 May 2023 06:10) (18 - 24)  SpO2: 94% (26 May 2023 06:10) (92% - 98%)  -at 11:15am, SpO2 at 92% while sitting, decreased to 86% while ambulating    Parameters below as of 25 May 2023 23:47  Patient On (Oxygen Delivery Method): room air            PHYSICAL EXAM:  Constitutional: resting comfortably; NAD; no increased work of breathing  HEENT: NC/AT, PERRL, EOMI, anicteric sclera, MMM, no rhinorrhea  Neck: supple; no JVD or thyromegaly  Respiratory: coughing intermittently; no sputum production; diffusely decreased lung sounds; mild bilateral expiratory wheeze more pronounced on left; no rhonchi or rales; no retractions  Cardiac: +S1/S2; RRR; no M/R/G  Gastrointestinal: soft, NT/ND; no rebound or guarding; +BSx4; healed surgical scar midline s/p hysterectomy; no ascites   Extremities: WWP, no clubbing or cyanosis; no peripheral edema  Musculoskeletal: NROM x4; no joint swelling, tenderness or erythema  Vascular: 2+ radial, DP/PT pulses B/L  Dermatologic: skin warm, dry and intact; no rashes, wounds, or scars  Neurologic: AAOx3, no focal deficits  Psychiatric: calm, cooperative, behaviors are appropriate, denies SI/HI    MEDICATIONS:  MEDICATIONS  (STANDING):  albuterol/ipratropium for Nebulization 3 milliLiter(s) Nebulizer every 6 hours  atorvastatin 40 milliGRAM(s) Oral at bedtime  budesonide  80 MICROgram(s)/formoterol 4.5 MICROgram(s) Inhaler 2 Puff(s) Inhalation every 12 hours  dextrose 5%. 1000 milliLiter(s) (50 mL/Hr) IV Continuous <Continuous>  dextrose 5%. 1000 milliLiter(s) (100 mL/Hr) IV Continuous <Continuous>  dextrose 50% Injectable 25 Gram(s) IV Push once  dextrose 50% Injectable 12.5 Gram(s) IV Push once  dextrose 50% Injectable 25 Gram(s) IV Push once  glucagon  Injectable 1 milliGRAM(s) IntraMuscular once  heparin   Injectable 5000 Unit(s) SubCutaneous every 8 hours  insulin lispro (ADMELOG) corrective regimen sliding scale   SubCutaneous Before meals and at bedtime  predniSONE   Tablet 40 milliGRAM(s) Oral every 24 hours  sertraline 50 milliGRAM(s) Oral daily  verapamil 120 milliGRAM(s) Oral every 24 hours  verapamil  milliGRAM(s) Oral every 24 hours    MEDICATIONS  (PRN):  albuterol    90 MICROgram(s) HFA Inhaler 2 Puff(s) Inhalation every 6 hours PRN Shortness of Breath and/or Wheezing  dextrose Oral Gel 15 Gram(s) Oral once PRN Blood Glucose LESS THAN 70 milliGRAM(s)/deciliter      ALLERGIES:  Allergies    penicillin (Unknown)    Intolerances        LABS:                        12.6   3.95  )-----------( 232      ( 26 May 2023 05:30 )             39.0     05-25    137  |  100  |  31<H>  ----------------------------<  109<H>  4.0   |  24  |  1.44<H>    Ca    9.2      25 May 2023 16:25  Phos  4.6     05-26  Mg     2.9     05-26      RADIOLOGY & ADDITIONAL TESTS: Reviewed.   CC: Patient is a 82y old  Female who presents with a chief complaint of sob (25 May 2023 20:18)    INTERVAL EVENTS: NITIN    SUBJECTIVE / INTERVAL HPI: Patient seen and examined at bedside. Pt with aid and her son, who report a recent COPD exacerbation 2 weeks ago where she was seen and discharged at [place] [facility]. She followed up with pulmonologist (Dr. Paula Aguilar at [place]; [phone]; direct line [phone]) who prescribed nebulizer and tapered dose of steroids. CXR obtained at the time with unknown results. Pt reports chronic cough with no changes to sputum volume or viscosity. No purulent sputum.     ROS: negative unless otherwise stated above.    VITAL SIGNS:  Vital Signs Last 24 Hrs  T(C): 36.4 (26 May 2023 06:10), Max: 37.8 (25 May 2023 16:10)  T(F): 97.5 (26 May 2023 06:10), Max: 100 (25 May 2023 16:10)  HR: 74 (26 May 2023 06:10) (74 - 114)  BP: 156/92 (26 May 2023 06:10) (120/72 - 159/81)  BP(mean): 107 (25 May 2023 23:47) (107 - 107)  RR: 18 (26 May 2023 06:10) (18 - 24)  SpO2: 94% (26 May 2023 06:10) (92% - 98%)  -at 11:15am, SpO2 at 92% while sitting, decreased to 86% while ambulating    Parameters below as of 25 May 2023 23:47  Patient On (Oxygen Delivery Method): room air            PHYSICAL EXAM:  Constitutional: resting comfortably; NAD; no increased work of breathing  HEENT: NC/AT, PERRL, EOMI, anicteric sclera, MMM, no rhinorrhea  Neck: supple; no JVD or thyromegaly  Respiratory: coughing intermittently; no sputum production; diffusely decreased lung sounds; mild bilateral expiratory wheeze more pronounced on left; no rhonchi or rales; no retractions  Cardiac: +S1/S2; RRR; no M/R/G  Gastrointestinal: soft, NT/ND; no rebound or guarding; +BSx4; healed surgical scar midline s/p hysterectomy; no ascites   Extremities: WWP, no clubbing or cyanosis; no peripheral edema  Musculoskeletal: NROM x4; no joint swelling, tenderness or erythema  Vascular: 2+ radial, DP/PT pulses B/L  Dermatologic: skin warm, dry and intact; no rashes, wounds, or scars  Neurologic: AAOx3, no focal deficits  Psychiatric: calm, cooperative, behaviors are appropriate, denies SI/HI    MEDICATIONS:  MEDICATIONS  (STANDING):  albuterol/ipratropium for Nebulization 3 milliLiter(s) Nebulizer every 6 hours  atorvastatin 40 milliGRAM(s) Oral at bedtime  budesonide  80 MICROgram(s)/formoterol 4.5 MICROgram(s) Inhaler 2 Puff(s) Inhalation every 12 hours  dextrose 5%. 1000 milliLiter(s) (50 mL/Hr) IV Continuous <Continuous>  dextrose 5%. 1000 milliLiter(s) (100 mL/Hr) IV Continuous <Continuous>  dextrose 50% Injectable 25 Gram(s) IV Push once  dextrose 50% Injectable 12.5 Gram(s) IV Push once  dextrose 50% Injectable 25 Gram(s) IV Push once  glucagon  Injectable 1 milliGRAM(s) IntraMuscular once  heparin   Injectable 5000 Unit(s) SubCutaneous every 8 hours  insulin lispro (ADMELOG) corrective regimen sliding scale   SubCutaneous Before meals and at bedtime  predniSONE   Tablet 40 milliGRAM(s) Oral every 24 hours  sertraline 50 milliGRAM(s) Oral daily  verapamil 120 milliGRAM(s) Oral every 24 hours  verapamil  milliGRAM(s) Oral every 24 hours    MEDICATIONS  (PRN):  albuterol    90 MICROgram(s) HFA Inhaler 2 Puff(s) Inhalation every 6 hours PRN Shortness of Breath and/or Wheezing  dextrose Oral Gel 15 Gram(s) Oral once PRN Blood Glucose LESS THAN 70 milliGRAM(s)/deciliter      ALLERGIES:  Allergies    penicillin (Unknown)    Intolerances        LABS:                        12.6   3.95  )-----------( 232      ( 26 May 2023 05:30 )             39.0     05-25    137  |  100  |  31<H>  ----------------------------<  109<H>  4.0   |  24  |  1.44<H>    Ca    9.2      25 May 2023 16:25  Phos  4.6     05-26  Mg     2.9     05-26      RADIOLOGY & ADDITIONAL TESTS: Reviewed.

## 2023-05-26 NOTE — PROGRESS NOTE ADULT - PROBLEM SELECTOR PLAN 5
Aid reports that patient not formally diagnosed with dementia and appears to be at her baseline s/p ED initial treatment.  On donepezil and memantine, unclear dosing.  AAOX3 upon exam but unable to recall events around her sob episode.  - aid to provide dosages tomorrow Aid reports that patient not formally diagnosed with dementia and appears to be at her baseline s/p ED initial treatment.  On donepezil 5mg and memantine 10 mg, per aid and son.  AAOX3 upon exam but unable to recall events around her sob episode.

## 2023-05-26 NOTE — PHYSICAL THERAPY INITIAL EVALUATION ADULT - ADDITIONAL COMMENTS
Pt prior level given by pt, HHA, and son present in room; prior to admission pt with HHA 7d/wk, variable hours during the day, pt home alone at night. Pt has rollator, shower chair, grab bars, straight cane. Pt has HHA mostly due to new onset of memory loss but does provide assist with mobility intermittently when needed.

## 2023-05-27 LAB
ANION GAP SERPL CALC-SCNC: 11 MMOL/L — SIGNIFICANT CHANGE UP (ref 5–17)
APPEARANCE UR: CLEAR — SIGNIFICANT CHANGE UP
BACTERIA # UR AUTO: PRESENT /HPF
BASOPHILS # BLD AUTO: 0.01 K/UL — SIGNIFICANT CHANGE UP (ref 0–0.2)
BASOPHILS NFR BLD AUTO: 0.1 % — SIGNIFICANT CHANGE UP (ref 0–2)
BILIRUB UR-MCNC: NEGATIVE — SIGNIFICANT CHANGE UP
BUN SERPL-MCNC: 49 MG/DL — HIGH (ref 7–23)
CALCIUM SERPL-MCNC: 9.4 MG/DL — SIGNIFICANT CHANGE UP (ref 8.4–10.5)
CHLORIDE SERPL-SCNC: 106 MMOL/L — SIGNIFICANT CHANGE UP (ref 96–108)
CO2 SERPL-SCNC: 20 MMOL/L — LOW (ref 22–31)
COLOR SPEC: YELLOW — SIGNIFICANT CHANGE UP
COMMENT - URINE: SIGNIFICANT CHANGE UP
CREAT ?TM UR-MCNC: 130 MG/DL — SIGNIFICANT CHANGE UP
CREAT SERPL-MCNC: 1.55 MG/DL — HIGH (ref 0.5–1.3)
DIFF PNL FLD: NEGATIVE — SIGNIFICANT CHANGE UP
EGFR: 33 ML/MIN/1.73M2 — LOW
EOSINOPHIL # BLD AUTO: 0 K/UL — SIGNIFICANT CHANGE UP (ref 0–0.5)
EOSINOPHIL NFR BLD AUTO: 0 % — SIGNIFICANT CHANGE UP (ref 0–6)
EPI CELLS # UR: SIGNIFICANT CHANGE UP /HPF (ref 0–5)
GLUCOSE BLDC GLUCOMTR-MCNC: 103 MG/DL — HIGH (ref 70–99)
GLUCOSE BLDC GLUCOMTR-MCNC: 147 MG/DL — HIGH (ref 70–99)
GLUCOSE BLDC GLUCOMTR-MCNC: 157 MG/DL — HIGH (ref 70–99)
GLUCOSE BLDC GLUCOMTR-MCNC: 191 MG/DL — HIGH (ref 70–99)
GLUCOSE SERPL-MCNC: 161 MG/DL — HIGH (ref 70–99)
GLUCOSE UR QL: NEGATIVE — SIGNIFICANT CHANGE UP
HCT VFR BLD CALC: 35.3 % — SIGNIFICANT CHANGE UP (ref 34.5–45)
HGB BLD-MCNC: 11.1 G/DL — LOW (ref 11.5–15.5)
HYALINE CASTS # UR AUTO: ABNORMAL /LPF (ref 0–2)
IMM GRANULOCYTES NFR BLD AUTO: 0.4 % — SIGNIFICANT CHANGE UP (ref 0–0.9)
KETONES UR-MCNC: NEGATIVE — SIGNIFICANT CHANGE UP
LEUKOCYTE ESTERASE UR-ACNC: NEGATIVE — SIGNIFICANT CHANGE UP
LYMPHOCYTES # BLD AUTO: 0.33 K/UL — LOW (ref 1–3.3)
LYMPHOCYTES # BLD AUTO: 3.1 % — LOW (ref 13–44)
MAGNESIUM SERPL-MCNC: 2.6 MG/DL — SIGNIFICANT CHANGE UP (ref 1.6–2.6)
MCHC RBC-ENTMCNC: 28.5 PG — SIGNIFICANT CHANGE UP (ref 27–34)
MCHC RBC-ENTMCNC: 31.4 GM/DL — LOW (ref 32–36)
MCV RBC AUTO: 90.5 FL — SIGNIFICANT CHANGE UP (ref 80–100)
MONOCYTES # BLD AUTO: 0.63 K/UL — SIGNIFICANT CHANGE UP (ref 0–0.9)
MONOCYTES NFR BLD AUTO: 6 % — SIGNIFICANT CHANGE UP (ref 2–14)
NEUTROPHILS # BLD AUTO: 9.56 K/UL — HIGH (ref 1.8–7.4)
NEUTROPHILS NFR BLD AUTO: 90.4 % — HIGH (ref 43–77)
NITRITE UR-MCNC: NEGATIVE — SIGNIFICANT CHANGE UP
NRBC # BLD: 0 /100 WBCS — SIGNIFICANT CHANGE UP (ref 0–0)
PH UR: 6 — SIGNIFICANT CHANGE UP (ref 5–8)
PHOSPHATE SERPL-MCNC: 3.8 MG/DL — SIGNIFICANT CHANGE UP (ref 2.5–4.5)
PLATELET # BLD AUTO: 242 K/UL — SIGNIFICANT CHANGE UP (ref 150–400)
POTASSIUM SERPL-MCNC: 4.2 MMOL/L — SIGNIFICANT CHANGE UP (ref 3.5–5.3)
POTASSIUM SERPL-SCNC: 4.2 MMOL/L — SIGNIFICANT CHANGE UP (ref 3.5–5.3)
PROT UR-MCNC: 30 MG/DL
RBC # BLD: 3.9 M/UL — SIGNIFICANT CHANGE UP (ref 3.8–5.2)
RBC # FLD: 15.9 % — HIGH (ref 10.3–14.5)
RBC CASTS # UR COMP ASSIST: < 5 /HPF — SIGNIFICANT CHANGE UP
SODIUM SERPL-SCNC: 137 MMOL/L — SIGNIFICANT CHANGE UP (ref 135–145)
SODIUM UR-SCNC: <20 MMOL/L — SIGNIFICANT CHANGE UP
SP GR SPEC: >=1.03 — SIGNIFICANT CHANGE UP (ref 1–1.03)
UROBILINOGEN FLD QL: 0.2 E.U./DL — SIGNIFICANT CHANGE UP
UUN UR-MCNC: 980 MG/DL — SIGNIFICANT CHANGE UP
WBC # BLD: 10.57 K/UL — HIGH (ref 3.8–10.5)
WBC # FLD AUTO: 10.57 K/UL — HIGH (ref 3.8–10.5)
WBC UR QL: < 5 /HPF — SIGNIFICANT CHANGE UP

## 2023-05-27 PROCEDURE — 99223 1ST HOSP IP/OBS HIGH 75: CPT | Mod: GC

## 2023-05-27 PROCEDURE — 99233 SBSQ HOSP IP/OBS HIGH 50: CPT | Mod: GC

## 2023-05-27 RX ADMIN — Medication 100 MILLIGRAM(S): at 11:14

## 2023-05-27 RX ADMIN — Medication 3 MILLILITER(S): at 11:14

## 2023-05-27 RX ADMIN — Medication 120 MILLIGRAM(S): at 21:53

## 2023-05-27 RX ADMIN — Medication 1: at 13:36

## 2023-05-27 RX ADMIN — SERTRALINE 50 MILLIGRAM(S): 25 TABLET, FILM COATED ORAL at 11:14

## 2023-05-27 RX ADMIN — Medication 3 MILLILITER(S): at 06:23

## 2023-05-27 RX ADMIN — HEPARIN SODIUM 5000 UNIT(S): 5000 INJECTION INTRAVENOUS; SUBCUTANEOUS at 13:36

## 2023-05-27 RX ADMIN — Medication 240 MILLIGRAM(S): at 06:23

## 2023-05-27 RX ADMIN — Medication 3 MILLILITER(S): at 23:52

## 2023-05-27 RX ADMIN — Medication 3 MILLILITER(S): at 17:24

## 2023-05-27 RX ADMIN — Medication 1: at 22:26

## 2023-05-27 RX ADMIN — Medication 40 MILLIGRAM(S): at 15:09

## 2023-05-27 RX ADMIN — HEPARIN SODIUM 5000 UNIT(S): 5000 INJECTION INTRAVENOUS; SUBCUTANEOUS at 21:54

## 2023-05-27 RX ADMIN — ATORVASTATIN CALCIUM 40 MILLIGRAM(S): 80 TABLET, FILM COATED ORAL at 21:54

## 2023-05-27 RX ADMIN — BUDESONIDE AND FORMOTEROL FUMARATE DIHYDRATE 2 PUFF(S): 160; 4.5 AEROSOL RESPIRATORY (INHALATION) at 21:56

## 2023-05-27 RX ADMIN — BUDESONIDE AND FORMOTEROL FUMARATE DIHYDRATE 2 PUFF(S): 160; 4.5 AEROSOL RESPIRATORY (INHALATION) at 10:16

## 2023-05-27 RX ADMIN — HEPARIN SODIUM 5000 UNIT(S): 5000 INJECTION INTRAVENOUS; SUBCUTANEOUS at 06:23

## 2023-05-27 NOTE — CONSULT NOTE ADULT - ASSESSMENT
82 year old admitted for increased work of breathing at PT and pulmonary called for continued cough. Patient being treated for COPD exacerbation with visible hyperinflation of the lungs on CXR      COPD exacerbation  Hyperinflation  Cough    Patient chest imaging shows clear hyperinflation of the lungs with some small visible dilated airways. Patient likely has poor clearance of secretions in  the setting of bronchiectasis from her chronic obstructive airways disease. Agree with asteroids and would give duonebs standing as well as airway clearance with aerobika. She has strong cough so does not need hypertonic saline but has very poor movement of air likely from impacted airways with mucus which aerobika will mobilize    - Agree with steroids for exacerbation  - Continue bronchodilators  - Aerobika for airway clearance likely underlying bronchiectasis and large amount of mucous in airways

## 2023-05-27 NOTE — PROGRESS NOTE ADULT - SUBJECTIVE AND OBJECTIVE BOX
Physical Medicine and Rehabilitation Progress Note :       Patient is a 82y old  Female who presents with a chief complaint of sob (27 May 2023 10:10)      HPI:  81 yo F with PMHx HTN, HLD, Emphysema (not on home O2), Asthma, memory loss (newly on memantine and donepzeil), depressionpresents to ED c/o SOB since yesterday but she does not remember details about what happened.  Her aid at bedside (Loren) states that this began yesterday with PT.  Loren was not there but was told that when PT was working with her, her HR and BP both increased and she was short of breath.  Today when was with the patient, she was trying to get the patient to go for a walk but noticed that was more tired and had iwob and a worsening cough (no change in sputum production noted).  She gave her a nebulizer with minimal relief and since she "didn't like how she looked", she called the ambulence.      Per ED report, EMS reported circumoral cyanosis and tachypnea to 30s and satting mid-80s on RA with temp of 100.  EMS had given her X1 duoneb.    Of note, patient is AAOX3 at time of interview but unable to recall any details surrounding the event.  Neither she nor the aid can report any triggers, including allergens or signs/symptoms of illness.  Aid reports that patient not formally diagnosed with dementia and appears to be at her baseline s/p ED initial treatment.          In the ED:  Initial vital signs: T: 100 F, HR: 95, BP: 150/83, R: 20, SpO2: 96% on RA  ED course:   Labs: significant for Cr 1.44  Imaging:  CXR: no infiltrates  EKG: sinus tach with PACs  Medications: solumedrol 125mg X1, duoneb X1, Mg 2g  Consults: none  (25 May 2023 20:18)                            11.1   10.57 )-----------( 242      ( 27 May 2023 07:25 )             35.3       05-27    137  |  106  |  49<H>  ----------------------------<  161<H>  4.2   |  20<L>  |  1.55<H>    Ca    9.4      27 May 2023 07:26  Phos  3.8     05-27  Mg     2.6     05-27      Vital Signs Last 24 Hrs  T(C): 36.4 (27 May 2023 10:24), Max: 36.8 (26 May 2023 21:00)  T(F): 97.5 (27 May 2023 10:24), Max: 98.3 (26 May 2023 21:00)  HR: 80 (27 May 2023 11:21) (80 - 95)  BP: 149/82 (27 May 2023 10:24) (137/73 - 157/81)  BP(mean): --  RR: 17 (27 May 2023 10:24) (16 - 18)  SpO2: 90% (27 May 2023 11:21) (88% - 94%)    Parameters below as of 27 May 2023 11:21  Patient On (Oxygen Delivery Method): room air        MEDICATIONS  (STANDING):  albuterol/ipratropium for Nebulization 3 milliLiter(s) Nebulizer every 6 hours  atorvastatin 40 milliGRAM(s) Oral at bedtime  budesonide  80 MICROgram(s)/formoterol 4.5 MICROgram(s) Inhaler 2 Puff(s) Inhalation every 12 hours  dextrose 5%. 1000 milliLiter(s) (50 mL/Hr) IV Continuous <Continuous>  dextrose 5%. 1000 milliLiter(s) (100 mL/Hr) IV Continuous <Continuous>  dextrose 50% Injectable 25 Gram(s) IV Push once  dextrose 50% Injectable 12.5 Gram(s) IV Push once  dextrose 50% Injectable 25 Gram(s) IV Push once  glucagon  Injectable 1 milliGRAM(s) IntraMuscular once  heparin   Injectable 5000 Unit(s) SubCutaneous every 8 hours  insulin lispro (ADMELOG) corrective regimen sliding scale   SubCutaneous Before meals and at bedtime  predniSONE   Tablet 40 milliGRAM(s) Oral every 24 hours  sertraline 50 milliGRAM(s) Oral daily  sodium chloride 0.9%. 1000 milliLiter(s) (85 mL/Hr) IV Continuous <Continuous>  verapamil 120 milliGRAM(s) Oral every 24 hours  verapamil  milliGRAM(s) Oral every 24 hours    MEDICATIONS  (PRN):  albuterol    90 MICROgram(s) HFA Inhaler 2 Puff(s) Inhalation every 6 hours PRN Shortness of Breath and/or Wheezing  dextrose Oral Gel 15 Gram(s) Oral once PRN Blood Glucose LESS THAN 70 milliGRAM(s)/deciliter        Initial Functional Status Assessment :       Previous Level of Function:     · Ambulation Skills	independent; needs device  · Transfer Skills	independent; needs device  · ADL Skills	independent; needs device  · Work/Leisure Activity	independent; needs device  · Additional Comments	Pt prior level given by pt, HHA, and son present in room; prior to admission pt with HHA 7d/wk, variable hours during the day, pt home alone at night. Pt has rollator, shower chair, grab bars, straight cane. Pt has HHA mostly due to new onset of memory loss but does provide assist with mobility intermittently when needed.    Cognitive Status Examination:   · Orientation	oriented to person, place, time and situation  · Level of Consciousness	alert  · Follows Commands and Answers Questions	100% of the time  · Personal Safety and Judgment	intact  · Short Term Memory	impaired  as per son and HHA    Range of Motion Exam:   · Range of Motion Examination	bilateral lower extremity ROM was WFL (within functional limits)    Manual Muscle Testing:   · Manual Muscle Testing Results	no strength deficits were identified    Transfer: Sit to Stand:     · Level of [place]	contact guard  · Physical Assist/Nonphysical Assist	1 person assist  · Weight-Bearing Restrictions	weight-bearing as tolerated  · Assistive Device	standard walker    Transfer: Stand to Sit:     · Level of [place]	contact guard  · Physical Assist/Nonphysical Assist	1 person assist  · Weight-Bearing Restrictions	weight-bearing as tolerated  · Assistive Device	rolling walker    Gait Skills:     · Level of [place]	contact guard  · Physical Assist/Nonphysical Assist	1 person assist  · Weight-Bearing Restrictions	weight-bearing as tolerated  · Assistive Device	rolling walker  · Gait Distance	50 feet    Gait Analysis:     · Gait Pattern Used	3-point gait  · Gait Deviations Noted	increased time in double stance; decreased step length; decreased stride length  · Impairments Contributing to Gait Deviations	impaired motor control; impaired postural control; decreased strength    Balance Skills Assessment:     · Sitting Balance: Static	good balance  · Sitting Balance: Dynamic	good balance  · Standing Balance: Static	good balance  shakiness in standing, new onset this week as per HHA  · Standing Balance: Dynamic	fair balance  · Identified Impairments Contributing to Balance Disturbance	decreased strength; impaired postural control    Sensory Examination:   Sensory Examination:    Grossly Intact:   · Gross Sensory Examination	Grossly Intact      Clinical Impressions:   · Criteria for Skilled Therapeutic Interventions	impairments found; anticipated equipment needs at discharge; rehab potential; therapy frequency; functional limitations in following categories; risk reduction/prevention; predicted duration of therapy intervention; anticipated discharge recommendation  · Impairments Found (describe specific impairments)	aerobic capacity/endurance; gait, locomotion, and balance; ergonomics and body mechanics; joint integrity and mobility; gross motor; muscle strength; posture  · Functional Limitations in Following Categories (describe specific limitations)	self-care; home management; community/leisure  · Risk Reduction/Prevention (Describe Specific Areas of risk reduction/prevention)	risk factors  · Risk Areas	fall        PM&R Impression : as above    Current Disposition Plan Recommendations :      d/c home , home physical therapy , continue private A

## 2023-05-27 NOTE — CHART NOTE - NSCHARTNOTEFT_GEN_A_CORE
Nutrition consult received for MST score of 2 or greater. RD spoke with patient who was alert and oriented. Pt denies any recent weight loss or further nutrition-related concerns. Consult was removed as criteria for malnutrition is not met. RD to remain available.

## 2023-05-27 NOTE — PROGRESS NOTE ADULT - SUBJECTIVE AND OBJECTIVE BOX
OVERNIGHT EVENTS: NAEO    SUBJECTIVE / INTERVAL HPI: Patient seen and examined at bedside. Has been having severe cough, feels like its tiring her out. Wants to go home but feels overall weak from coughing, wants to discuss more later. Will try Tessalon perles for cough and report back. No chest pain, palpitations, fever, chills.     VITAL SIGNS:  Vital Signs Last 24 Hrs  T(C): 36.4 (27 May 2023 10:24), Max: 36.8 (26 May 2023 21:00)  T(F): 97.5 (27 May 2023 10:24), Max: 98.3 (26 May 2023 21:00)  HR: 80 (27 May 2023 11:21) (80 - 95)  BP: 149/82 (27 May 2023 10:24) (137/73 - 157/81)  BP(mean): --  RR: 17 (27 May 2023 10:24) (16 - 18)  SpO2: 90% (27 May 2023 11:21) (88% - 94%)    Parameters below as of 27 May 2023 11:21  Patient On (Oxygen Delivery Method): room air        PHYSICAL EXAM:    Constitutional: Tired appearing, sitting upright in bed, alert.   HEENT: NC/AT, PERRL, EOMI, anicteric sclera, MMM, no rhinorrhea  Neck: supple; no JVD or thyromegaly  Respiratory: Frequent, productive cough w/ scant yellow sputum. Diffusely decreased lung sounds; b/l wheezing. No increased work of breathing, speaking full sentences interrupted w/ coughing  Cardiac: +S1/S2; RRR; no M/R/G  Gastrointestinal: soft, NT/ND; no rebound or guarding; +BSx4; healed surgical scar midline s/p hysterectomy; no ascites   Extremities: WWP, no clubbing or cyanosis; no peripheral edema  Musculoskeletal: NROM x4; no joint swelling, tenderness or erythema  Vascular: 2+ radial, DP/PT pulses B/L  Dermatologic: skin warm, dry and intact; no rashes, wounds, or scars  Neurologic: AAOx3, no focal deficits  Psychiatric: calm, cooperative, behaviors are appropriate, denies SI/HI      MEDICATIONS:  MEDICATIONS  (STANDING):  albuterol/ipratropium for Nebulization 3 milliLiter(s) Nebulizer every 6 hours  atorvastatin 40 milliGRAM(s) Oral at bedtime  budesonide  80 MICROgram(s)/formoterol 4.5 MICROgram(s) Inhaler 2 Puff(s) Inhalation every 12 hours  dextrose 5%. 1000 milliLiter(s) (100 mL/Hr) IV Continuous <Continuous>  dextrose 5%. 1000 milliLiter(s) (50 mL/Hr) IV Continuous <Continuous>  dextrose 50% Injectable 25 Gram(s) IV Push once  dextrose 50% Injectable 12.5 Gram(s) IV Push once  dextrose 50% Injectable 25 Gram(s) IV Push once  glucagon  Injectable 1 milliGRAM(s) IntraMuscular once  heparin   Injectable 5000 Unit(s) SubCutaneous every 8 hours  insulin lispro (ADMELOG) corrective regimen sliding scale   SubCutaneous Before meals and at bedtime  predniSONE   Tablet 40 milliGRAM(s) Oral every 24 hours  sertraline 50 milliGRAM(s) Oral daily  sodium chloride 0.9%. 1000 milliLiter(s) (85 mL/Hr) IV Continuous <Continuous>  verapamil 120 milliGRAM(s) Oral every 24 hours  verapamil  milliGRAM(s) Oral every 24 hours    MEDICATIONS  (PRN):  albuterol    90 MICROgram(s) HFA Inhaler 2 Puff(s) Inhalation every 6 hours PRN Shortness of Breath and/or Wheezing  benzonatate 100 milliGRAM(s) Oral once PRN Cough  dextrose Oral Gel 15 Gram(s) Oral once PRN Blood Glucose LESS THAN 70 milliGRAM(s)/deciliter      ALLERGIES:  Allergies    penicillin (Unknown)    Intolerances        LABS:                        11.1   10.57 )-----------( 242      ( 27 May 2023 07:25 )             35.3     05-27    137  |  106  |  49<H>  ----------------------------<  161<H>  4.2   |  20<L>  |  1.55<H>    Ca    9.4      27 May 2023 07:26  Phos  3.8     05-27  Mg     2.6     05-27        Urinalysis Basic - ( 27 May 2023 12:36 )    Color: Yellow / Appearance: Clear / SG: >=1.030 / pH: x  Gluc: x / Ketone: NEGATIVE  / Bili: Negative / Urobili: 0.2 E.U./dL   Blood: x / Protein: 30 mg/dL / Nitrite: NEGATIVE   Leuk Esterase: NEGATIVE / RBC: < 5 /HPF / WBC < 5 /HPF   Sq Epi: x / Non Sq Epi: x / Bacteria: Present /HPF      CAPILLARY BLOOD GLUCOSE      POCT Blood Glucose.: 157 mg/dL (27 May 2023 13:18)      RADIOLOGY & ADDITIONAL TESTS: Reviewed.

## 2023-05-27 NOTE — PROGRESS NOTE ADULT - PROBLEM SELECTOR PLAN 3
Presents with Cr. 1.44. Baseline is 1.24 with BUN 32, as seen on last labs done outpatient by PCP on May 2022. Currently looks like prerenal i/s/o dehydration with BUN/Cr ratio >20.   - Give NS 500cc bolus  - Obtain urine lytes  - Repeat BMP in evening  - If improving, can follow up with PCP Dr. Cristian Carroll Presented with Cr. 1.44. Baseline is 1.24 with BUN 32, as seen on last labs done outpatient by PCP on May 2022.  BUN/Cr ratio >20. FeUrea 23.8%. FeNa 0.2% - suggests pre-renal  s/p NS 500cc bolus on 5/26 --> Cr 1.55 on 5/27 from 2.05.  - encourage PO intake  - trend Cr, consider maintenance fluids if Cr not improving  - If improving, can follow up with PCP Dr. Cristian Carroll

## 2023-05-27 NOTE — PROGRESS NOTE ADULT - ASSESSMENT
IM    82 y o F with PMHx HTN, Emphysema (not on home O2), Asthma, memory loss (newly on memantine 10mg and donepezil 5mg) presents to ED c/o SOB X2 days of unclear etiology, now resolved.    Problem/Plan - 1:  ·  Problem: Acute respiratory failure with hypoxia.   ·  Plan: Presents c/o acute sob X1 day.  EMS reported desat to 80s.  No obvious triggers.  CXR clear.  RVP and COVID negative.  - Appears to have resolved after steroids and duonebs in ED as patient currently satting 93-95% on RA  - Treat for asthma exacerbation with prednisone 40mg X5 days  - duonebs and albuterol prn  - Symbicort bid.    Problem/Plan - 2:  ·  Problem: Acute asthma exacerbation.   ·  Plan: Hx of asthma on Trelegy and albuterol prn.  - treatment as above  - follow up with Pulmonologist, Dr. Paula Aguilar ([phone]; direct line [phone]) to discuss recent visit   - obtain PFTs  - no treatment with macrolides indicated at this time, given no increase in sputum volume/viscosity or sputum purulence.    Problem/Plan - 3:  ·  Problem: YUNI (acute kidney injury).   ·  Plan: Presents with Cr. 1.44. Baseline is 1.24 with BUN 32, as seen on last labs done outpatient by PCP on May 2022. Currently looks like prerenal i/s/o dehydration with BUN/Cr ratio >20.   - Give NS 500cc bolus  - Obtain urine lytes  - Repeat BMP in evening  - If improving, can follow up with PCP Dr. Cristian Carroll.    Problem/Plan - 4:  ·  Problem: Emphysema/COPD.   ·  Plan: Hx of emphysema on inhalers as above.    Problem/Plan - 5:  ·  Problem: Memory loss.   ·  Plan: Aid reports that patient not formally diagnosed with dementia and appears to be at her baseline s/p ED initial treatment.  On donepezil 5mg and memantine 10 mg, per aid and son.  AAOX3 upon exam but unable to recall events around her sob episode.    Problem/Plan - 6:  ·  Problem: Depression, major.   ·  Plan: Hx of depression on sertraline 50mg qd.  - c/w home med.    Problem/Plan - 7:  ·  Problem: HTN (hypertension).   ·  Plan: Hx of HTN on verapamil 240mg qAM and 120mg qPM  - c/w home meds.    Problem/Plan - 8:  ·  Problem: HLD (hyperlipidemia).   ·  Plan: Hx of HLD on rosuvastatin 10mg qhs.  - c/w atorvastatin therapeutic interchange.    Problem/Plan - 9:  ·  Problem: Nutrition, metabolism, and development symptoms.   ·  Plan: F: none  E: replete prn  N: DASH  DVT proph: IMPROVE 1.

## 2023-05-27 NOTE — PROGRESS NOTE ADULT - PROBLEM SELECTOR PLAN 1
Presents c/o acute sob X1 day.  EMS reported desat to 80s.  No obvious triggers.  CXR clear.  RVP and COVID negative.  - Appears to have resolved after steroids and duonebs in ED as patient currently satting 93-95% on RA  - Treat for asthma exacerbation with prednisone 40mg X5 days  - duonebs and albuterol prn  - Symbicort bid Presents c/o acute sob X1 day.  EMS reported desat to 80s.  No obvious triggers.  CXR clear.  RVP and COVID negative.  Initially appeared to have improved after starting prednisone 40mg qd, duonebs, albuterol, symbicort.   However 5/27: Pt satting 87-88% on RA, decreased from previous days. On exam, decreased breath sounds b/l w/ b/l wheezing, no audible crackles.   - If continues to have low O2 saturation on RA, will consider repeat CXR today  - Pulm consulted, will f/u recs  - Treat for asthma exacerbation with prednisone 40mg X5 days  - duonebs and albuterol prn  - Symbicort bid

## 2023-05-27 NOTE — PROGRESS NOTE ADULT - PROBLEM SELECTOR PLAN 5
Aid reports that patient not formally diagnosed with dementia and appears to be at her baseline s/p ED initial treatment.  On donepezil 5mg and memantine 10 mg, per aid and son.  AAOX3 upon exam but unable to recall events around her sob episode.

## 2023-05-27 NOTE — PROGRESS NOTE ADULT - ASSESSMENT
81 yo F with PMHx HTN, Emphysema (not on home O2), Asthma, memory loss (newly on memantine 10mg and donepezil 5mg) presents to ED c/o SOB X2 days of unclear etiology, now resolved.

## 2023-05-27 NOTE — PROGRESS NOTE ADULT - PROBLEM SELECTOR PLAN 2
Hx of asthma on Trelegy and albuterol prn.  - treatment as above  - follow up with Pulmonologist, Dr. Paula Aguilar ([phone]; direct line [phone]) to discuss recent visit   - obtain PFTs  - no treatment with macrolides indicated at this time, given no increase in sputum volume/viscosity or sputum purulence

## 2023-05-28 LAB
ALBUMIN SERPL ELPH-MCNC: 3.3 G/DL — SIGNIFICANT CHANGE UP (ref 3.3–5)
ALP SERPL-CCNC: 76 U/L — SIGNIFICANT CHANGE UP (ref 40–120)
ALT FLD-CCNC: 28 U/L — SIGNIFICANT CHANGE UP (ref 10–45)
ANION GAP SERPL CALC-SCNC: 9 MMOL/L — SIGNIFICANT CHANGE UP (ref 5–17)
AST SERPL-CCNC: 36 U/L — SIGNIFICANT CHANGE UP (ref 10–40)
BASOPHILS # BLD AUTO: 0 K/UL — SIGNIFICANT CHANGE UP (ref 0–0.2)
BASOPHILS NFR BLD AUTO: 0 % — SIGNIFICANT CHANGE UP (ref 0–2)
BILIRUB SERPL-MCNC: 0.3 MG/DL — SIGNIFICANT CHANGE UP (ref 0.2–1.2)
BUN SERPL-MCNC: 46 MG/DL — HIGH (ref 7–23)
CALCIUM SERPL-MCNC: 9.3 MG/DL — SIGNIFICANT CHANGE UP (ref 8.4–10.5)
CHLORIDE SERPL-SCNC: 106 MMOL/L — SIGNIFICANT CHANGE UP (ref 96–108)
CO2 SERPL-SCNC: 24 MMOL/L — SIGNIFICANT CHANGE UP (ref 22–31)
CREAT SERPL-MCNC: 1.42 MG/DL — HIGH (ref 0.5–1.3)
EGFR: 37 ML/MIN/1.73M2 — LOW
EOSINOPHIL # BLD AUTO: 0 K/UL — SIGNIFICANT CHANGE UP (ref 0–0.5)
EOSINOPHIL NFR BLD AUTO: 0 % — SIGNIFICANT CHANGE UP (ref 0–6)
GLUCOSE BLDC GLUCOMTR-MCNC: 119 MG/DL — HIGH (ref 70–99)
GLUCOSE BLDC GLUCOMTR-MCNC: 127 MG/DL — HIGH (ref 70–99)
GLUCOSE BLDC GLUCOMTR-MCNC: 91 MG/DL — SIGNIFICANT CHANGE UP (ref 70–99)
GLUCOSE BLDC GLUCOMTR-MCNC: 94 MG/DL — SIGNIFICANT CHANGE UP (ref 70–99)
GLUCOSE SERPL-MCNC: 156 MG/DL — HIGH (ref 70–99)
HCT VFR BLD CALC: 35.6 % — SIGNIFICANT CHANGE UP (ref 34.5–45)
HGB BLD-MCNC: 11.1 G/DL — LOW (ref 11.5–15.5)
IMM GRANULOCYTES NFR BLD AUTO: 0.4 % — SIGNIFICANT CHANGE UP (ref 0–0.9)
LYMPHOCYTES # BLD AUTO: 0.29 K/UL — LOW (ref 1–3.3)
LYMPHOCYTES # BLD AUTO: 3.7 % — LOW (ref 13–44)
MAGNESIUM SERPL-MCNC: 2.6 MG/DL — SIGNIFICANT CHANGE UP (ref 1.6–2.6)
MCHC RBC-ENTMCNC: 28.4 PG — SIGNIFICANT CHANGE UP (ref 27–34)
MCHC RBC-ENTMCNC: 31.2 GM/DL — LOW (ref 32–36)
MCV RBC AUTO: 91 FL — SIGNIFICANT CHANGE UP (ref 80–100)
MONOCYTES # BLD AUTO: 0.53 K/UL — SIGNIFICANT CHANGE UP (ref 0–0.9)
MONOCYTES NFR BLD AUTO: 6.8 % — SIGNIFICANT CHANGE UP (ref 2–14)
NEUTROPHILS # BLD AUTO: 6.89 K/UL — SIGNIFICANT CHANGE UP (ref 1.8–7.4)
NEUTROPHILS NFR BLD AUTO: 89.1 % — HIGH (ref 43–77)
NRBC # BLD: 0 /100 WBCS — SIGNIFICANT CHANGE UP (ref 0–0)
PHOSPHATE SERPL-MCNC: 3.5 MG/DL — SIGNIFICANT CHANGE UP (ref 2.5–4.5)
PLATELET # BLD AUTO: 210 K/UL — SIGNIFICANT CHANGE UP (ref 150–400)
POTASSIUM SERPL-MCNC: 4.8 MMOL/L — SIGNIFICANT CHANGE UP (ref 3.5–5.3)
POTASSIUM SERPL-SCNC: 4.8 MMOL/L — SIGNIFICANT CHANGE UP (ref 3.5–5.3)
PROT SERPL-MCNC: 6.3 G/DL — SIGNIFICANT CHANGE UP (ref 6–8.3)
RBC # BLD: 3.91 M/UL — SIGNIFICANT CHANGE UP (ref 3.8–5.2)
RBC # FLD: 16.3 % — HIGH (ref 10.3–14.5)
SODIUM SERPL-SCNC: 139 MMOL/L — SIGNIFICANT CHANGE UP (ref 135–145)
WBC # BLD: 7.74 K/UL — SIGNIFICANT CHANGE UP (ref 3.8–10.5)
WBC # FLD AUTO: 7.74 K/UL — SIGNIFICANT CHANGE UP (ref 3.8–10.5)

## 2023-05-28 PROCEDURE — 99233 SBSQ HOSP IP/OBS HIGH 50: CPT | Mod: GC

## 2023-05-28 PROCEDURE — 71045 X-RAY EXAM CHEST 1 VIEW: CPT | Mod: 26

## 2023-05-28 RX ORDER — AZITHROMYCIN 500 MG/1
500 TABLET, FILM COATED ORAL DAILY
Refills: 0 | Status: DISCONTINUED | OUTPATIENT
Start: 2023-05-28 | End: 2023-05-29

## 2023-05-28 RX ADMIN — SERTRALINE 50 MILLIGRAM(S): 25 TABLET, FILM COATED ORAL at 11:21

## 2023-05-28 RX ADMIN — Medication 120 MILLIGRAM(S): at 22:35

## 2023-05-28 RX ADMIN — Medication 3 MILLILITER(S): at 17:01

## 2023-05-28 RX ADMIN — BUDESONIDE AND FORMOTEROL FUMARATE DIHYDRATE 2 PUFF(S): 160; 4.5 AEROSOL RESPIRATORY (INHALATION) at 22:36

## 2023-05-28 RX ADMIN — Medication 3 MILLILITER(S): at 11:21

## 2023-05-28 RX ADMIN — AZITHROMYCIN 500 MILLIGRAM(S): 500 TABLET, FILM COATED ORAL at 16:33

## 2023-05-28 RX ADMIN — ATORVASTATIN CALCIUM 40 MILLIGRAM(S): 80 TABLET, FILM COATED ORAL at 22:35

## 2023-05-28 RX ADMIN — Medication 240 MILLIGRAM(S): at 06:25

## 2023-05-28 RX ADMIN — Medication 3 MILLILITER(S): at 05:35

## 2023-05-28 RX ADMIN — HEPARIN SODIUM 5000 UNIT(S): 5000 INJECTION INTRAVENOUS; SUBCUTANEOUS at 06:25

## 2023-05-28 RX ADMIN — Medication 3 MILLILITER(S): at 22:35

## 2023-05-28 RX ADMIN — HEPARIN SODIUM 5000 UNIT(S): 5000 INJECTION INTRAVENOUS; SUBCUTANEOUS at 14:09

## 2023-05-28 RX ADMIN — HEPARIN SODIUM 5000 UNIT(S): 5000 INJECTION INTRAVENOUS; SUBCUTANEOUS at 22:35

## 2023-05-28 RX ADMIN — Medication 40 MILLIGRAM(S): at 16:33

## 2023-05-28 RX ADMIN — Medication 100 MILLIGRAM(S): at 06:27

## 2023-05-28 RX ADMIN — BUDESONIDE AND FORMOTEROL FUMARATE DIHYDRATE 2 PUFF(S): 160; 4.5 AEROSOL RESPIRATORY (INHALATION) at 08:36

## 2023-05-28 NOTE — PROGRESS NOTE ADULT - PROBLEM SELECTOR PLAN 2
Hx of asthma on Trelegy and albuterol prn.  - treatment as above  - follow up with Pulmonologist, Dr. Paula Aguilar ([phone]; direct line [phone]) to discuss recent visit   - obtain PFTs  - no treatment with macrolides indicated at this time, given no increase in sputum volume/viscosity or sputum purulence

## 2023-05-28 NOTE — CONSULT NOTE ADULT - ASSESSMENT
82F PMH HTN, HLD, Emphysema (not on home O2), Asthma, memory loss (newly on memantine and donepzeil), depression presents to ED c/o SOB since 5/24 admitted with increased wob, worsening cough (no change in sputum production noted), admitted for acute respiratory failure     #Increased work of breathing  Tachypneic at rest, with poor air movement on exam, likely 2/2 increased air retention/trapping. Suspect this is 2/2 COPD exacerbation. She is unsure of her home regimen but is prescribed albuterol prn and fluticasone/umeclidinum/vilanterol.   Recommend:  - maintain BiPAP at night (can start with 10/5, 40% settings)  - standing duonebs q6 hrs to help with hyperinflation   - nebs while on BiPAP  - aerobika  - would extend prednisone to at least 7 day course (started on 5/26)  - dispo: discussed with patient that ideally she should be transferred to a more monitored unit such as telemetry for closer monitoring of her breathing, especially given use of bipap and risk of intubation if breathing worsens. However, pt declined and prefers to stay on RMF at least overnight as she is very anxious and does not want to go through a transfer today. Would re-address this tomorrow pending clinical course, in interim monitor breathing closely overnight and notify ICU team if any clinical decompensation.    *rest of plan per primary team. Recs prelim until attending attestation.   82F PMH HTN, HLD, Emphysema (not on home O2), Asthma, some memory loss, depression presents to ED c/o SOB since 5/24 admitted with increased wob, worsening cough (no change in sputum production noted), admitted for acute respiratory failure     #Increased work of breathing  Tachypneic at rest, with poor air movement on exam, likely 2/2 increased air retention/trapping. Suspect this is 2/2 COPD exacerbation. She is unsure of her home regimen but is prescribed albuterol prn and fluticasone/umeclidinum/vilanterol.   Recommend:  - maintain BiPAP at night (can start with 10/5, 40% settings)  - standing duonebs q6 hrs to help with hyperinflation   - nebs while on BiPAP  - aerobika  - would extend prednisone to at least 7 day course (started on 5/26)  - dispo: discussed with patient that ideally she should be transferred to a more monitored unit such as telemetry for closer monitoring of her breathing, especially given use of bipap and risk of intubation if breathing worsens. However, pt declined and prefers to stay on RMF at least overnight as she is very anxious and does not want to go through a transfer today. Would re-address this tomorrow pending clinical course, in interim monitor breathing closely overnight and notify ICU team if any clinical decompensation.    *rest of plan per primary team. Recs prelim until attending attestation.

## 2023-05-28 NOTE — PROGRESS NOTE ADULT - SUBJECTIVE AND OBJECTIVE BOX
OVERNIGHT EVENTS: NAEO    SUBJECTIVE / INTERVAL HPI: Patient seen and examined at bedside. Reports that her breathing is minimally improved. Feels very tight and is tiring from her symptoms. Denies chest pain, palpitations, fever, chills. ROS is otherwise negative.         Vital Signs Last 24 Hrs  T(C): 36.6 (28 May 2023 11:44), Max: 36.7 (28 May 2023 06:02)  T(F): 97.8 (28 May 2023 11:44), Max: 98 (28 May 2023 06:02)  HR: 95 (28 May 2023 11:44) (89 - 95)  BP: 150/77 (28 May 2023 11:44) (147/75 - 156/74)  BP(mean): --  RR: 22 (28 May 2023 11:44) (18 - 22)  SpO2: 96% (28 May 2023 11:44) (88% - 96%)    Parameters below as of 28 May 2023 11:44  Patient On (Oxygen Delivery Method): nasal cannula  O2 Flow (L/min): 2          PHYSICAL EXAM:    Constitutional: Tired appearing, sitting upright in bed, alert.   HEENT: NC/AT, PERRL, EOMI, anicteric sclera, MMM, no rhinorrhea  Neck: supple; no JVD or thyromegaly  Respiratory: Frequent, productive cough w/ scant yellow sputum. Diffusely decreased lung sounds; b/l wheezing. No increased work of breathing, speaking full sentences interrupted w/ coughing  Cardiac: +S1/S2; RRR; no M/R/G  Gastrointestinal: soft, NT/ND; no rebound or guarding; +BSx4; healed surgical scar midline s/p hysterectomy; no ascites   Extremities: WWP, no clubbing or cyanosis; no peripheral edema  Musculoskeletal: NROM x4; no joint swelling, tenderness or erythema  Vascular: 2+ radial, DP/PT pulses B/L  Dermatologic: skin warm, dry and intact; no rashes, wounds, or scars  Neurologic: AAOx3, no focal deficits  Psychiatric: calm, cooperative, behaviors are appropriate, denies SI/HI  Skin: no rash      MEDICATIONS  (STANDING):  albuterol/ipratropium for Nebulization 3 milliLiter(s) Nebulizer every 6 hours  atorvastatin 40 milliGRAM(s) Oral at bedtime  budesonide  80 MICROgram(s)/formoterol 4.5 MICROgram(s) Inhaler 2 Puff(s) Inhalation every 12 hours  dextrose 5%. 1000 milliLiter(s) (100 mL/Hr) IV Continuous <Continuous>  dextrose 5%. 1000 milliLiter(s) (50 mL/Hr) IV Continuous <Continuous>  dextrose 50% Injectable 25 Gram(s) IV Push once  dextrose 50% Injectable 12.5 Gram(s) IV Push once  dextrose 50% Injectable 25 Gram(s) IV Push once  glucagon  Injectable 1 milliGRAM(s) IntraMuscular once  heparin   Injectable 5000 Unit(s) SubCutaneous every 8 hours  insulin lispro (ADMELOG) corrective regimen sliding scale   SubCutaneous Before meals and at bedtime  predniSONE   Tablet 40 milliGRAM(s) Oral every 24 hours  sertraline 50 milliGRAM(s) Oral daily  sodium chloride 0.9%. 1000 milliLiter(s) (85 mL/Hr) IV Continuous <Continuous>  verapamil 120 milliGRAM(s) Oral every 24 hours  verapamil  milliGRAM(s) Oral every 24 hours    MEDICATIONS  (PRN):  albuterol    90 MICROgram(s) HFA Inhaler 2 Puff(s) Inhalation every 6 hours PRN Shortness of Breath and/or Wheezing  dextrose Oral Gel 15 Gram(s) Oral once PRN Blood Glucose LESS THAN 70 milliGRAM(s)/deciliter        ALLERGIES:  Allergies    penicillin (Unknown)    Intolerances        LABS:                                   11.1   7.74  )-----------( 210      ( 28 May 2023 05:30 )             35.6   05-28    139  |  106  |  46<H>  ----------------------------<  156<H>  4.8   |  24  |  1.42<H>    Ca    9.3      28 May 2023 05:30  Phos  3.5     05-28  Mg     2.6     05-28    TPro  6.3  /  Alb  3.3  /  TBili  0.3  /  DBili  x   /  AST  36  /  ALT  28  /  AlkPhos  76  05-28          Urinalysis Basic - ( 27 May 2023 12:36 )    Color: Yellow / Appearance: Clear / SG: >=1.030 / pH: x  Gluc: x / Ketone: NEGATIVE  / Bili: Negative / Urobili: 0.2 E.U./dL   Blood: x / Protein: 30 mg/dL / Nitrite: NEGATIVE   Leuk Esterase: NEGATIVE / RBC: < 5 /HPF / WBC < 5 /HPF   Sq Epi: x / Non Sq Epi: x / Bacteria: Present /HPF      CAPILLARY BLOOD GLUCOSE      POCT Blood Glucose.: 157 mg/dL (27 May 2023 13:18)      RADIOLOGY & ADDITIONAL TESTS: Reviewed.

## 2023-05-28 NOTE — PROGRESS NOTE ADULT - PROBLEM SELECTOR PLAN 3
Presented with Cr. 1.44. Baseline is 1.24 with BUN 32, as seen on last labs done outpatient by PCP on May 2022.  BUN/Cr ratio >20. FeUrea 23.8%. FeNa 0.2% - suggests pre-renal  s/p NS 500cc bolus on 5/26 --> Cr 1.55 on 5/27 from 2.05.  - encourage PO intake  - trend Cr, consider maintenance fluids if Cr not improving  - If improving, can follow up with PCP Dr. Cristian Carroll

## 2023-05-28 NOTE — CONSULT NOTE ADULT - ATTENDING COMMENTS
plan as above
COPD with worsening work of breathing, very hyperinflated.  Recommend tx to ICU or at least telemetry for NIV and close monitoring.  Patient is refusing to transfer, feels tired, stressed, overwhelmed. Danger of delaying and not close monitored treatment explained, patient expressed understanding.  Will start NIV (10/5) for work of breathing on the floor with concomitant use of BD nebs. Continuing steroids.  Plan for rep management discussed with respiratory therapist, night intensivist aware of patient. If status change we'll reevaluate.

## 2023-05-28 NOTE — PROGRESS NOTE ADULT - PROBLEM SELECTOR PLAN 1
Presents c/o acute sob X1 day.  EMS reported desat to 80s.  No obvious triggers.  CXR clear.  RVP and COVID negative.  Initially appeared to have improved after starting prednisone 40mg qd, duonebs, albuterol, symbicort.   However 5/27: Pt satting 87-88% on RA, decreased from previous days. On exam, decreased breath sounds b/l w/ b/l wheezing, no audible crackles.   - If continues to have low O2 saturation on RA, will consider repeat CXR today  - Pulm consulted, will f/u recs  - Treat for asthma exacerbation with prednisone 40mg X5 days  - duonebs and albuterol prn  - Symbicort bid

## 2023-05-29 DIAGNOSIS — I47.1 SUPRAVENTRICULAR TACHYCARDIA: ICD-10-CM

## 2023-05-29 LAB
ALBUMIN SERPL ELPH-MCNC: 2.7 G/DL — LOW (ref 3.3–5)
ALP SERPL-CCNC: 69 U/L — SIGNIFICANT CHANGE UP (ref 40–120)
ALT FLD-CCNC: 29 U/L — SIGNIFICANT CHANGE UP (ref 10–45)
ANION GAP SERPL CALC-SCNC: 10 MMOL/L — SIGNIFICANT CHANGE UP (ref 5–17)
AST SERPL-CCNC: 33 U/L — SIGNIFICANT CHANGE UP (ref 10–40)
BASE EXCESS BLDA CALC-SCNC: -0.2 MMOL/L — SIGNIFICANT CHANGE UP (ref -2–3)
BASOPHILS # BLD AUTO: 0.01 K/UL — SIGNIFICANT CHANGE UP (ref 0–0.2)
BASOPHILS NFR BLD AUTO: 0.1 % — SIGNIFICANT CHANGE UP (ref 0–2)
BILIRUB SERPL-MCNC: 0.4 MG/DL — SIGNIFICANT CHANGE UP (ref 0.2–1.2)
BUN SERPL-MCNC: 44 MG/DL — HIGH (ref 7–23)
CALCIUM SERPL-MCNC: 9.7 MG/DL — SIGNIFICANT CHANGE UP (ref 8.4–10.5)
CHLORIDE SERPL-SCNC: 106 MMOL/L — SIGNIFICANT CHANGE UP (ref 96–108)
CO2 BLDA-SCNC: 24 MMOL/L — SIGNIFICANT CHANGE UP (ref 19–24)
CO2 SERPL-SCNC: 22 MMOL/L — SIGNIFICANT CHANGE UP (ref 22–31)
CREAT SERPL-MCNC: 1.31 MG/DL — HIGH (ref 0.5–1.3)
EGFR: 41 ML/MIN/1.73M2 — LOW
EOSINOPHIL # BLD AUTO: 0 K/UL — SIGNIFICANT CHANGE UP (ref 0–0.5)
EOSINOPHIL NFR BLD AUTO: 0 % — SIGNIFICANT CHANGE UP (ref 0–6)
GLUCOSE BLDC GLUCOMTR-MCNC: 105 MG/DL — HIGH (ref 70–99)
GLUCOSE BLDC GLUCOMTR-MCNC: 111 MG/DL — HIGH (ref 70–99)
GLUCOSE BLDC GLUCOMTR-MCNC: 116 MG/DL — HIGH (ref 70–99)
GLUCOSE BLDC GLUCOMTR-MCNC: 166 MG/DL — HIGH (ref 70–99)
GLUCOSE SERPL-MCNC: 147 MG/DL — HIGH (ref 70–99)
GRAM STN FLD: SIGNIFICANT CHANGE UP
HCO3 BLDA-SCNC: 23 MMOL/L — SIGNIFICANT CHANGE UP (ref 21–28)
HCT VFR BLD CALC: 32.1 % — LOW (ref 34.5–45)
HGB BLD-MCNC: 10.1 G/DL — LOW (ref 11.5–15.5)
HOROWITZ INDEX BLDA+IHG-RTO: 40 — SIGNIFICANT CHANGE UP
IMM GRANULOCYTES NFR BLD AUTO: 0.5 % — SIGNIFICANT CHANGE UP (ref 0–0.9)
LYMPHOCYTES # BLD AUTO: 0.32 K/UL — LOW (ref 1–3.3)
LYMPHOCYTES # BLD AUTO: 3.8 % — LOW (ref 13–44)
MAGNESIUM SERPL-MCNC: 2.5 MG/DL — SIGNIFICANT CHANGE UP (ref 1.6–2.6)
MCHC RBC-ENTMCNC: 28.7 PG — SIGNIFICANT CHANGE UP (ref 27–34)
MCHC RBC-ENTMCNC: 31.5 GM/DL — LOW (ref 32–36)
MCV RBC AUTO: 91.2 FL — SIGNIFICANT CHANGE UP (ref 80–100)
MONOCYTES # BLD AUTO: 0.43 K/UL — SIGNIFICANT CHANGE UP (ref 0–0.9)
MONOCYTES NFR BLD AUTO: 5.1 % — SIGNIFICANT CHANGE UP (ref 2–14)
NEUTROPHILS # BLD AUTO: 7.68 K/UL — HIGH (ref 1.8–7.4)
NEUTROPHILS NFR BLD AUTO: 90.5 % — HIGH (ref 43–77)
NRBC # BLD: 0 /100 WBCS — SIGNIFICANT CHANGE UP (ref 0–0)
PCO2 BLDA: 31 MMHG — LOW (ref 32–45)
PH BLDA: 7.47 — HIGH (ref 7.35–7.45)
PHOSPHATE SERPL-MCNC: 3.8 MG/DL — SIGNIFICANT CHANGE UP (ref 2.5–4.5)
PLATELET # BLD AUTO: 199 K/UL — SIGNIFICANT CHANGE UP (ref 150–400)
PO2 BLDA: 119 MMHG — HIGH (ref 83–108)
POTASSIUM SERPL-MCNC: 5.2 MMOL/L — SIGNIFICANT CHANGE UP (ref 3.5–5.3)
POTASSIUM SERPL-SCNC: 5.2 MMOL/L — SIGNIFICANT CHANGE UP (ref 3.5–5.3)
PROT SERPL-MCNC: 5.9 G/DL — LOW (ref 6–8.3)
RAPID RVP RESULT: SIGNIFICANT CHANGE UP
RBC # BLD: 3.52 M/UL — LOW (ref 3.8–5.2)
RBC # FLD: 16.5 % — HIGH (ref 10.3–14.5)
SAO2 % BLDA: 99.4 % — HIGH (ref 94–98)
SARS-COV-2 RNA SPEC QL NAA+PROBE: SIGNIFICANT CHANGE UP
SODIUM SERPL-SCNC: 138 MMOL/L — SIGNIFICANT CHANGE UP (ref 135–145)
SPECIMEN SOURCE: SIGNIFICANT CHANGE UP
WBC # BLD: 8.48 K/UL — SIGNIFICANT CHANGE UP (ref 3.8–10.5)
WBC # FLD AUTO: 8.48 K/UL — SIGNIFICANT CHANGE UP (ref 3.8–10.5)

## 2023-05-29 PROCEDURE — 99233 SBSQ HOSP IP/OBS HIGH 50: CPT | Mod: GC

## 2023-05-29 RX ORDER — AZITHROMYCIN 500 MG/1
500 TABLET, FILM COATED ORAL EVERY 24 HOURS
Refills: 0 | Status: COMPLETED | OUTPATIENT
Start: 2023-05-29 | End: 2023-05-29

## 2023-05-29 RX ORDER — MORPHINE SULFATE 50 MG/1
0.5 CAPSULE, EXTENDED RELEASE ORAL ONCE
Refills: 0 | Status: DISCONTINUED | OUTPATIENT
Start: 2023-05-29 | End: 2023-05-29

## 2023-05-29 RX ORDER — VERAPAMIL HCL 240 MG
240 CAPSULE, EXTENDED RELEASE PELLETS 24 HR ORAL EVERY 24 HOURS
Refills: 0 | Status: DISCONTINUED | OUTPATIENT
Start: 2023-05-30 | End: 2023-05-30

## 2023-05-29 RX ORDER — IPRATROPIUM/ALBUTEROL SULFATE 18-103MCG
3 AEROSOL WITH ADAPTER (GRAM) INHALATION EVERY 4 HOURS
Refills: 0 | Status: DISCONTINUED | OUTPATIENT
Start: 2023-05-29 | End: 2023-06-02

## 2023-05-29 RX ORDER — VERAPAMIL HCL 240 MG
190 CAPSULE, EXTENDED RELEASE PELLETS 24 HR ORAL ONCE
Refills: 0 | Status: DISCONTINUED | OUTPATIENT
Start: 2023-05-29 | End: 2023-05-29

## 2023-05-29 RX ORDER — DONEPEZIL HYDROCHLORIDE 10 MG/1
5 TABLET, FILM COATED ORAL AT BEDTIME
Refills: 0 | Status: DISCONTINUED | OUTPATIENT
Start: 2023-05-29 | End: 2023-06-02

## 2023-05-29 RX ADMIN — Medication 3 MILLILITER(S): at 12:05

## 2023-05-29 RX ADMIN — SERTRALINE 50 MILLIGRAM(S): 25 TABLET, FILM COATED ORAL at 13:47

## 2023-05-29 RX ADMIN — Medication 120 MILLIGRAM(S): at 21:35

## 2023-05-29 RX ADMIN — Medication 100 MILLIGRAM(S): at 16:35

## 2023-05-29 RX ADMIN — DONEPEZIL HYDROCHLORIDE 5 MILLIGRAM(S): 10 TABLET, FILM COATED ORAL at 21:42

## 2023-05-29 RX ADMIN — Medication 1: at 21:35

## 2023-05-29 RX ADMIN — AZITHROMYCIN 255 MILLIGRAM(S): 500 TABLET, FILM COATED ORAL at 13:06

## 2023-05-29 RX ADMIN — HEPARIN SODIUM 5000 UNIT(S): 5000 INJECTION INTRAVENOUS; SUBCUTANEOUS at 13:05

## 2023-05-29 RX ADMIN — BUDESONIDE AND FORMOTEROL FUMARATE DIHYDRATE 2 PUFF(S): 160; 4.5 AEROSOL RESPIRATORY (INHALATION) at 21:00

## 2023-05-29 RX ADMIN — Medication 40 MILLIGRAM(S): at 12:05

## 2023-05-29 RX ADMIN — Medication 3 MILLILITER(S): at 20:00

## 2023-05-29 RX ADMIN — Medication 3 MILLILITER(S): at 16:40

## 2023-05-29 RX ADMIN — Medication 30 MILLIGRAM(S): at 19:01

## 2023-05-29 RX ADMIN — HEPARIN SODIUM 5000 UNIT(S): 5000 INJECTION INTRAVENOUS; SUBCUTANEOUS at 07:30

## 2023-05-29 RX ADMIN — HEPARIN SODIUM 5000 UNIT(S): 5000 INJECTION INTRAVENOUS; SUBCUTANEOUS at 21:36

## 2023-05-29 RX ADMIN — ATORVASTATIN CALCIUM 40 MILLIGRAM(S): 80 TABLET, FILM COATED ORAL at 21:36

## 2023-05-29 NOTE — PROGRESS NOTE ADULT - ASSESSMENT
83 yo F with PMHx HTN, Emphysema (not on home O2), Asthma, memory loss (newly on memantine 10mg and donepezil 5mg) presents to ED c/o SOB X2 days of unclear etiology, now resolved. 81 yo F with PMHx HTN, Emphysema (not on home O2), Asthma, memory loss (newly on memantine 10mg and donepezil 5mg) presents to ED c/o SOB X2 days, presumed asthma exacerbation. Initially improved, but now with increased WOB and tachypnea, with some improvement on Bipap overnight, now being stepped up to tele.

## 2023-05-29 NOTE — PROGRESS NOTE ADULT - PROBLEM SELECTOR PLAN 9
F: none  E: replete prn  N: DASH  DVT ppx: Improve score 1    Code status: FULL  Dispo: Tele Hx of HLD on rosuvastatin 10mg qhs.  - c/w atorvastatin therapeutic interchange 40mg qhs

## 2023-05-29 NOTE — PROGRESS NOTE ADULT - PROBLEM SELECTOR PLAN 5
Aid reports that patient not formally diagnosed with dementia and appears to be at her baseline s/p ED initial treatment.  On donepezil 5mg and memantine 10 mg, per aid and son.  AAOX3 upon exam but unable to recall events around her sob episode.  - Does not have capacity to make decisions   - HCP Kedar Jose: FULL CODE Hx of emphysema on inhalers as above- Trelegy and albuterol prn.

## 2023-05-29 NOTE — PROGRESS NOTE ADULT - PROBLEM SELECTOR PLAN 3
Presented with Cr. 1.44. Baseline is 1.24 with BUN 32, as seen on last labs done outpatient by PCP on May 2022.  BUN/Cr ratio >20. FeUrea 23.8%. FeNa 0.2% - suggests pre-renal  s/p NS 500cc bolus on 5/26 --> Cr 1.55 on 5/27 from 2.05.  - encourage PO intake  - trend Cr, consider maintenance fluids if Cr not improving  - If improving, can follow up with PCP Dr. Cristian Carroll Presented with Cr. 1.44. Baseline is 1.24 with BUN 32, as seen on last labs done outpatient by PCP on May 2022.  BUN/Cr ratio >20. FeUrea 23.8%. FeNa 0.2% - suggests pre-renal. S/p NS 500cc bolus on 5/26 --> Cr 1.55 on 5/27 from 2.05. Today Cr 1.31, almost back to baseline of 1.2.   - encourage PO intake when off Bipap  - trend Cr, consider maintenance fluids if Cr not improving  - follow up with PCP Dr. Cristian Carroll after discharge

## 2023-05-29 NOTE — PROGRESS NOTE ADULT - PROBLEM SELECTOR PLAN 4
Hx of emphysema on inhalers as above. Patient w/ MAT, likely due to hx of asthma. Home meds: verapamil 240mg qAM and 120mg qPM  - continue to monitor HR  - c/w home meds

## 2023-05-29 NOTE — PROGRESS NOTE ADULT - PROBLEM SELECTOR PLAN 8
Hx of HLD on rosuvastatin 10mg qhs.  - c/w atorvastatin therapeutic interchange Hx of HTN on verapamil 240mg qAM and 120mg qPM  - c/w home meds, if needs consistent bipap will need to start IV conversion

## 2023-05-29 NOTE — PROGRESS NOTE ADULT - SUBJECTIVE AND OBJECTIVE BOX
********ACCEPTANCE NOTE FROM [facility] TO TELE****************  Hospital course:  HOSPITAL COURSE:  81 yo F with PMHx HTN, HLD, Emphysema (not on home O2), Asthma, memory loss (newly on memantine and donepezil), depression presents to ED c/o SOB since 5/24 but she does not remember details about what happened. Her aid at bedside (Loren) stated that this began one day prior to admission with PT. Loren was not there but was told that when PT was working with her, her HR and BP both increased and she was SOB. Used nebulizer at home without relief (as prescribed by her Pulm Dr. Paula Aguilar [phone]; direct line [phone]).     Admitted for acute SOB 2/2 asthma exacerbation. Improved initially on nebulizer treatments with plan to discharge next day but noted to have worsening YUNI. Given fluids with response in Cr. Developed worsening SOB and cough (now with yellow-green sputum) with increased WOB and tachypnea on 5/28. ICU consulted for acute respiratory failure 2/2 COPD exacerbation. Patient refused transfer at the time, decision was made to closely monitor with Bipap overnight. As of this AM, patient amenable to transfer but has short-term memory loss so keeps forgetting conversations and refusing again. It was deemed that patient does not have capacity. Called son Kedar Jose who is HCP, who states he agrees with decision to have his mother transferred to Tele and she remains FULL CODE.     SUBJECTIVE / INTERVAL HPI: Patient seen and examined at bedside. Currently on BiPAP, in no acute distress. She says she has increased work of breathing with movement. Currently denies headache, fever, nausea, chills.     VITAL SIGNS:  Vital Signs Last 24 Hrs  T(C): 36.8 (29 May 2023 14:11), Max: 37 (28 May 2023 17:49)  T(F): 98.2 (29 May 2023 14:11), Max: 98.6 (28 May 2023 17:49)  HR: 104 (29 May 2023 12:58) (88 - 119)  BP: 153/87 (29 May 2023 11:30) (147/79 - 185/93)  BP(mean): 112 (29 May 2023 11:30) (112 - 126)  RR: 22 (29 May 2023 12:58) (18 - 36)  SpO2: 100% (29 May 2023 12:58) (91% - 100%)    Parameters below as of 29 May 2023 12:58  Patient On (Oxygen Delivery Method): nasal cannula, high flow    PHYSICAL EXAM:  Constitutional: anxious elderly female resting in bed;  HEENT: NC/AT, anicteric sclera, dry MM  Neck: supple; no JVD or thyromegaly  Respiratory: +diffusely decreased breath sounds, no wheezing, rhonchi, on BiPAP;  Cardiac: +S1/S2; +tachycardic with extra beats, no murmurs  Gastrointestinal: soft, NT/ND; no rebound or guarding; +BS  Extremities: WWP, no clubbing or cyanosis; no peripheral edema  Musculoskeletal: NROM x4;   Vascular: 2+ radial pulses B/L  Dermatologic: skin warm, dry and intact; no rashes, wounds, or scars  Neurologic: AAOx2-3, no focal deficits  Psychiatric: calm, cooperative, behaviors are appropriate    MEDICATIONS:  MEDICATIONS  (STANDING):  albuterol/ipratropium for Nebulization 3 milliLiter(s) Nebulizer every 4 hours  atorvastatin 40 milliGRAM(s) Oral at bedtime  budesonide  80 MICROgram(s)/formoterol 4.5 MICROgram(s) Inhaler 2 Puff(s) Inhalation every 12 hours  dextrose 5%. 1000 milliLiter(s) (100 mL/Hr) IV Continuous <Continuous>  dextrose 5%. 1000 milliLiter(s) (50 mL/Hr) IV Continuous <Continuous>  dextrose 50% Injectable 25 Gram(s) IV Push once  dextrose 50% Injectable 12.5 Gram(s) IV Push once  dextrose 50% Injectable 25 Gram(s) IV Push once  glucagon  Injectable 1 milliGRAM(s) IntraMuscular once  heparin   Injectable 5000 Unit(s) SubCutaneous every 8 hours  insulin lispro (ADMELOG) corrective regimen sliding scale   SubCutaneous Before meals and at bedtime  levoFLOXacin IVPB      methylPREDNISolone sodium succinate Injectable 30 milliGRAM(s) IV Push every 12 hours  sertraline 50 milliGRAM(s) Oral daily  verapamil 120 milliGRAM(s) Oral every 24 hours    MEDICATIONS  (PRN):  albuterol    90 MICROgram(s) HFA Inhaler 2 Puff(s) Inhalation every 6 hours PRN Shortness of Breath and/or Wheezing  dextrose Oral Gel 15 Gram(s) Oral once PRN Blood Glucose LESS THAN 70 milliGRAM(s)/deciliter      ALLERGIES:  Allergies    penicillin (Unknown)    Intolerances        LABS:                        10.1   8.48  )-----------( 199      ( 29 May 2023 05:30 )             32.1     05-29    138  |  106  |  44<H>  ----------------------------<  147<H>  5.2   |  22  |  1.31<H>    Ca    9.7      29 May 2023 05:30  Phos  3.8     05-29  Mg     2.5     05-29    TPro  5.9<L>  /  Alb  2.7<L>  /  TBili  0.4  /  DBili  x   /  AST  33  /  ALT  29  /  AlkPhos  69  05-29        CAPILLARY BLOOD GLUCOSE      POCT Blood Glucose.: 105 mg/dL (29 May 2023 11:08)      RADIOLOGY & ADDITIONAL TESTS: Reviewed.

## 2023-05-29 NOTE — PROGRESS NOTE ADULT - PROBLEM SELECTOR PLAN 5
Aid reports that patient not formally diagnosed with dementia and appears to be at her baseline s/p ED initial treatment.  On donepezil 5mg and memantine 10 mg, per aid and son.  AAOX3 upon exam but unable to recall events around her sob episode. Aid reports that patient not formally diagnosed with dementia and appears to be at her baseline s/p ED initial treatment.  On donepezil 5mg and memantine 10 mg, per aid and son.  AAOX3 upon exam but unable to recall events around her sob episode.  - Does not have capacity to make decisions   - HCP Kedar Jose: FULL CODE

## 2023-05-29 NOTE — PROGRESS NOTE ADULT - SUBJECTIVE AND OBJECTIVE BOX
CC: Patient is a 82y old  Female who presents with a chief complaint of sob (28 May 2023 16:45)      INTERVAL EVENTS: NITIN    SUBJECTIVE / INTERVAL HPI: Patient seen and examined at bedside.     ROS: negative unless otherwise stated above.    VITAL SIGNS:  Vital Signs Last 24 Hrs  T(C): 36.8 (29 May 2023 06:07), Max: 37 (28 May 2023 17:49)  T(F): 98.3 (29 May 2023 06:07), Max: 98.6 (28 May 2023 17:49)  HR: 96 (29 May 2023 06:33) (88 - 119)  BP: 152/76 (29 May 2023 06:07) (147/79 - 161/79)  BP(mean): --  RR: 28 (29 May 2023 06:33) (20 - 36)  SpO2: 98% (29 May 2023 06:33) (91% - 100%)    Parameters below as of 29 May 2023 06:33  Patient On (Oxygen Delivery Method): BiPAP/CPAP    O2 Concentration (%): 40        PHYSICAL EXAM:  Constitutional: resting comfortably; NAD  HEENT: NC/AT, PERRL, EOMI, anicteric sclera, MMM  Neck: supple; no JVD or thyromegaly  Respiratory: CTA B/L; no W/R/R, no retractions  Cardiac: +S1/S2; RRR; no M/R/G  Gastrointestinal: soft, NT/ND; no rebound or guarding; +BSx4  Extremities: WWP, no clubbing or cyanosis; no peripheral edema  Musculoskeletal: NROM x4; no joint swelling, tenderness or erythema  Vascular: 2+ radial, DP/PT pulses B/L  Dermatologic: skin warm, dry and intact; no rashes, wounds, or scars  Neurologic: AAOx3, no focal deficits  Psychiatric: calm, cooperative, behaviors are appropriate, denies SI/HI    MEDICATIONS:  MEDICATIONS  (STANDING):  albuterol/ipratropium for Nebulization 3 milliLiter(s) Nebulizer every 6 hours  atorvastatin 40 milliGRAM(s) Oral at bedtime  azithromycin   Tablet 500 milliGRAM(s) Oral daily  budesonide  80 MICROgram(s)/formoterol 4.5 MICROgram(s) Inhaler 2 Puff(s) Inhalation every 12 hours  dextrose 5%. 1000 milliLiter(s) (50 mL/Hr) IV Continuous <Continuous>  dextrose 5%. 1000 milliLiter(s) (100 mL/Hr) IV Continuous <Continuous>  dextrose 50% Injectable 25 Gram(s) IV Push once  dextrose 50% Injectable 12.5 Gram(s) IV Push once  dextrose 50% Injectable 25 Gram(s) IV Push once  glucagon  Injectable 1 milliGRAM(s) IntraMuscular once  heparin   Injectable 5000 Unit(s) SubCutaneous every 8 hours  insulin lispro (ADMELOG) corrective regimen sliding scale   SubCutaneous Before meals and at bedtime  predniSONE   Tablet 40 milliGRAM(s) Oral every 24 hours  sertraline 50 milliGRAM(s) Oral daily  sodium chloride 0.9%. 1000 milliLiter(s) (85 mL/Hr) IV Continuous <Continuous>  verapamil 120 milliGRAM(s) Oral every 24 hours  verapamil  milliGRAM(s) Oral every 24 hours    MEDICATIONS  (PRN):  albuterol    90 MICROgram(s) HFA Inhaler 2 Puff(s) Inhalation every 6 hours PRN Shortness of Breath and/or Wheezing  dextrose Oral Gel 15 Gram(s) Oral once PRN Blood Glucose LESS THAN 70 milliGRAM(s)/deciliter      ALLERGIES:  Allergies    penicillin (Unknown)    Intolerances        LABS:                        10.1   8.48  )-----------( 199      ( 29 May 2023 05:30 )             32.1     05-29    138  |  106  |  44<H>  ----------------------------<  147<H>  5.2   |  22  |  1.31<H>    Ca    9.7      29 May 2023 05:30  Phos  3.8     05-29  Mg     2.5     05-29    TPro  5.9<L>  /  Alb  2.7<L>  /  TBili  0.4  /  DBili  x   /  AST  33  /  ALT  29  /  AlkPhos  69  05-29      Urinalysis Basic - ( 27 May 2023 12:36 )    Color: Yellow / Appearance: Clear / SG: >=1.030 / pH: x  Gluc: x / Ketone: NEGATIVE  / Bili: Negative / Urobili: 0.2 E.U./dL   Blood: x / Protein: 30 mg/dL / Nitrite: NEGATIVE   Leuk Esterase: NEGATIVE / RBC: < 5 /HPF / WBC < 5 /HPF   Sq Epi: x / Non Sq Epi: x / Bacteria: Present /HPF      CAPILLARY BLOOD GLUCOSE      POCT Blood Glucose.: 119 mg/dL (28 May 2023 22:21)      RADIOLOGY & ADDITIONAL TESTS: Reviewed.   *******TRANSFER FROM [facility] TO TELE********  HOSPITAL COURSE:  83 yo F with PMHx HTN, HLD, Emphysema (not on home O2), Asthma, memory loss (newly on memantine and donepezil), depression presents to ED c/o SOB since 5/24 but she does not remember details about what happened. Her aid at bedside (Loren) stated that this began one day prior to admission with PT. Loren was not there but was told that when PT was working with her, her HR and BP both increased and she was SOB. Used nebulizer at home without relief (as prescribed by her Pulm Dr. Paula Aguilar [phone]; direct line [phone]).     Admitted for acute SOB 2/2 asthma exacerbation. Improved initially on nebulizer treatments with plan to discharge next day but noted to have worsening YUNI. Given fluids with response in Cr. Developed worsening SOB and cough (now with yellow-green sputum) with increased WOB and tachypnea on 5/28. ICU consulted for acute respiratory failure 2/2 COPD exacerbation. Patient refused transfer at the time, decision was made to closely monitor with Bipap overnight. As of this AM, patient amenable to transfer but has short-term memory loss so keeps forgetting conversations and refusing again. It was deemed that patient does not have capacity. Called son Kedar Jose who is HCP, who states he would want his mother transferred to Tele and would like to keep her FULL CODE.     SUBJECTIVE / INTERVAL HPI: Patient seen and examined at bedside.     ROS: negative unless otherwise stated above.    VITAL SIGNS:  Vital Signs Last 24 Hrs  T(C): 36.8 (29 May 2023 06:07), Max: 37 (28 May 2023 17:49)  T(F): 98.3 (29 May 2023 06:07), Max: 98.6 (28 May 2023 17:49)  HR: 96 (29 May 2023 06:33) (88 - 119)  BP: 152/76 (29 May 2023 06:07) (147/79 - 161/79)  BP(mean): --  RR: 28 (29 May 2023 06:33) (20 - 36)  SpO2: 98% (29 May 2023 06:33) (91% - 100%)    Parameters below as of 29 May 2023 06:33  Patient On (Oxygen Delivery Method): BiPAP/CPAP    O2 Concentration (%): 40        PHYSICAL EXAM:  Constitutional: resting comfortably; NAD  HEENT: NC/AT, PERRL, EOMI, anicteric sclera, MMM  Neck: supple; no JVD or thyromegaly  Respiratory: CTA B/L; no W/R/R, no retractions  Cardiac: +S1/S2; RRR; no M/R/G  Gastrointestinal: soft, NT/ND; no rebound or guarding; +BSx4  Extremities: WWP, no clubbing or cyanosis; no peripheral edema  Musculoskeletal: NROM x4; no joint swelling, tenderness or erythema  Vascular: 2+ radial, DP/PT pulses B/L  Dermatologic: skin warm, dry and intact; no rashes, wounds, or scars  Neurologic: AAOx3, no focal deficits  Psychiatric: calm, cooperative, behaviors are appropriate, denies SI/HI    MEDICATIONS:  MEDICATIONS  (STANDING):  albuterol/ipratropium for Nebulization 3 milliLiter(s) Nebulizer every 6 hours  atorvastatin 40 milliGRAM(s) Oral at bedtime  azithromycin   Tablet 500 milliGRAM(s) Oral daily  budesonide  80 MICROgram(s)/formoterol 4.5 MICROgram(s) Inhaler 2 Puff(s) Inhalation every 12 hours  dextrose 5%. 1000 milliLiter(s) (50 mL/Hr) IV Continuous <Continuous>  dextrose 5%. 1000 milliLiter(s) (100 mL/Hr) IV Continuous <Continuous>  dextrose 50% Injectable 25 Gram(s) IV Push once  dextrose 50% Injectable 12.5 Gram(s) IV Push once  dextrose 50% Injectable 25 Gram(s) IV Push once  glucagon  Injectable 1 milliGRAM(s) IntraMuscular once  heparin   Injectable 5000 Unit(s) SubCutaneous every 8 hours  insulin lispro (ADMELOG) corrective regimen sliding scale   SubCutaneous Before meals and at bedtime  predniSONE   Tablet 40 milliGRAM(s) Oral every 24 hours  sertraline 50 milliGRAM(s) Oral daily  sodium chloride 0.9%. 1000 milliLiter(s) (85 mL/Hr) IV Continuous <Continuous>  verapamil 120 milliGRAM(s) Oral every 24 hours  verapamil  milliGRAM(s) Oral every 24 hours    MEDICATIONS  (PRN):  albuterol    90 MICROgram(s) HFA Inhaler 2 Puff(s) Inhalation every 6 hours PRN Shortness of Breath and/or Wheezing  dextrose Oral Gel 15 Gram(s) Oral once PRN Blood Glucose LESS THAN 70 milliGRAM(s)/deciliter      ALLERGIES:  Allergies    penicillin (Unknown)    Intolerances        LABS:                        10.1   8.48  )-----------( 199      ( 29 May 2023 05:30 )             32.1     05-29    138  |  106  |  44<H>  ----------------------------<  147<H>  5.2   |  22  |  1.31<H>    Ca    9.7      29 May 2023 05:30  Phos  3.8     05-29  Mg     2.5     05-29    TPro  5.9<L>  /  Alb  2.7<L>  /  TBili  0.4  /  DBili  x   /  AST  33  /  ALT  29  /  AlkPhos  69  05-29      Urinalysis Basic - ( 27 May 2023 12:36 )    Color: Yellow / Appearance: Clear / SG: >=1.030 / pH: x  Gluc: x / Ketone: NEGATIVE  / Bili: Negative / Urobili: 0.2 E.U./dL   Blood: x / Protein: 30 mg/dL / Nitrite: NEGATIVE   Leuk Esterase: NEGATIVE / RBC: < 5 /HPF / WBC < 5 /HPF   Sq Epi: x / Non Sq Epi: x / Bacteria: Present /HPF      CAPILLARY BLOOD GLUCOSE      POCT Blood Glucose.: 119 mg/dL (28 May 2023 22:21)      RADIOLOGY & ADDITIONAL TESTS: Reviewed.   *******TRANSFER FROM [facility] TO TELE********  HOSPITAL COURSE:  81 yo F with PMHx HTN, HLD, Emphysema (not on home O2), Asthma, memory loss (newly on memantine and donepezil), depression presents to ED c/o SOB since 5/24 but she does not remember details about what happened. Her aid at bedside (Loren) stated that this began one day prior to admission with PT. Loren was not there but was told that when PT was working with her, her HR and BP both increased and she was SOB. Used nebulizer at home without relief (as prescribed by her Pulm Dr. Paula Aguilar [phone]; direct line [phone]).     Admitted for acute SOB 2/2 asthma exacerbation. Improved initially on nebulizer treatments with plan to discharge next day but noted to have worsening YUNI. Given fluids with response in Cr. Developed worsening SOB and cough (now with yellow-green sputum) with increased WOB and tachypnea on 5/28. ICU consulted for acute respiratory failure 2/2 COPD exacerbation. Patient refused transfer at the time, decision was made to closely monitor with Bipap overnight. As of this AM, patient amenable to transfer but has short-term memory loss so keeps forgetting conversations and refusing again. It was deemed that patient does not have capacity. Called son Kedar Jose who is HCP, who states he agrees with decision to have his mother transferred to Tele and she remains FULL CODE.     SUBJECTIVE / INTERVAL HPI: Patient seen and examined at bedside.     ROS: negative unless otherwise stated above.    VITAL SIGNS:  Vital Signs Last 24 Hrs  T(C): 36.8 (29 May 2023 06:07), Max: 37 (28 May 2023 17:49)  T(F): 98.3 (29 May 2023 06:07), Max: 98.6 (28 May 2023 17:49)  HR: 96 (29 May 2023 06:33) (88 - 119)  BP: 152/76 (29 May 2023 06:07) (147/79 - 161/79)  RR: 28 (29 May 2023 06:33) (20 - 36)  SpO2: 98% (29 May 2023 06:33) (91% - 100%)    Parameters below as of 29 May 2023 06:33  Patient On (Oxygen Delivery Method): BiPAP/CPAP    O2 Concentration (%): 40        PHYSICAL EXAM:  Constitutional: anxious elderly female in moderate respiratory distress  HEENT: NC/AT, anicteric sclera, dry MM  Neck: supple; no JVD or thyromegaly  Respiratory: +diffusely decreased breath sounds, +intermittent cough with green sputum production, +tachypneic, +mild retractions, +Increased WOB  Cardiac: +S1/S2; +tachycardic with extra beats, no murmurs  Gastrointestinal: soft, NT/ND; no rebound or guarding; +BS  Extremities: WWP, no clubbing or cyanosis; no peripheral edema  Musculoskeletal: NROM x4; no joint swelling, tenderness or erythema  Vascular: 2+ radial pulses B/L  Dermatologic: skin warm, dry and intact; no rashes, wounds, or scars  Neurologic: AAOx3, no focal deficits  Psychiatric: calm, cooperative, behaviors are appropriate    MEDICATIONS:  MEDICATIONS  (STANDING):  albuterol/ipratropium for Nebulization 3 milliLiter(s) Nebulizer every 6 hours  atorvastatin 40 milliGRAM(s) Oral at bedtime  azithromycin   Tablet 500 milliGRAM(s) Oral daily  budesonide  80 MICROgram(s)/formoterol 4.5 MICROgram(s) Inhaler 2 Puff(s) Inhalation every 12 hours  dextrose 5%. 1000 milliLiter(s) (50 mL/Hr) IV Continuous <Continuous>  dextrose 5%. 1000 milliLiter(s) (100 mL/Hr) IV Continuous <Continuous>  dextrose 50% Injectable 25 Gram(s) IV Push once  dextrose 50% Injectable 12.5 Gram(s) IV Push once  dextrose 50% Injectable 25 Gram(s) IV Push once  glucagon  Injectable 1 milliGRAM(s) IntraMuscular once  heparin   Injectable 5000 Unit(s) SubCutaneous every 8 hours  insulin lispro (ADMELOG) corrective regimen sliding scale   SubCutaneous Before meals and at bedtime  predniSONE   Tablet 40 milliGRAM(s) Oral every 24 hours  sertraline 50 milliGRAM(s) Oral daily  sodium chloride 0.9%. 1000 milliLiter(s) (85 mL/Hr) IV Continuous <Continuous>  verapamil 120 milliGRAM(s) Oral every 24 hours  verapamil  milliGRAM(s) Oral every 24 hours    MEDICATIONS  (PRN):  albuterol    90 MICROgram(s) HFA Inhaler 2 Puff(s) Inhalation every 6 hours PRN Shortness of Breath and/or Wheezing  dextrose Oral Gel 15 Gram(s) Oral once PRN Blood Glucose LESS THAN 70 milliGRAM(s)/deciliter      ALLERGIES:  Allergies    penicillin (Unknown)    Intolerances        LABS:                        10.1   8.48  )-----------( 199      ( 29 May 2023 05:30 )             32.1     05-29    138  |  106  |  44<H>  ----------------------------<  147<H>  5.2   |  22  |  1.31<H>    Ca    9.7      29 May 2023 05:30  Phos  3.8     05-29  Mg     2.5     05-29    TPro  5.9<L>  /  Alb  2.7<L>  /  TBili  0.4  /  DBili  x   /  AST  33  /  ALT  29  /  AlkPhos  69  05-29      Urinalysis Basic - ( 27 May 2023 12:36 )    Color: Yellow / Appearance: Clear / SG: >=1.030 / pH: x  Gluc: x / Ketone: NEGATIVE  / Bili: Negative / Urobili: 0.2 E.U./dL   Blood: x / Protein: 30 mg/dL / Nitrite: NEGATIVE   Leuk Esterase: NEGATIVE / RBC: < 5 /HPF / WBC < 5 /HPF   Sq Epi: x / Non Sq Epi: x / Bacteria: Present /HPF      CAPILLARY BLOOD GLUCOSE      POCT Blood Glucose.: 119 mg/dL (28 May 2023 22:21)      RADIOLOGY & ADDITIONAL TESTS: Reviewed.

## 2023-05-29 NOTE — PROGRESS NOTE ADULT - PROBLEM SELECTOR PLAN 9
F: none  E: replete prn  N: DASH  DVT proph: IMPROVE 1 F: none  E: replete prn  N: DASH  DVT ppx: Improve score 1    Code status: FULL  Dispo: [facility] --> Tele

## 2023-05-29 NOTE — PROGRESS NOTE ADULT - PROBLEM SELECTOR PLAN 2
Hx of asthma on Trelegy and albuterol prn.  - treatment as above  - follows with Pulmonologist, Dr. Paula Aguilar ([phone]; direct line [phone])

## 2023-05-29 NOTE — PROGRESS NOTE ADULT - PROBLEM SELECTOR PLAN 7
Hx of HTN on verapamil 240mg qAM and 120mg qPM  - c/w home meds Hx of depression on sertraline 50mg qd.  - c/w home med

## 2023-05-29 NOTE — PROGRESS NOTE ADULT - PROBLEM SELECTOR PLAN 6
Hx of depression on sertraline 50mg qd.  - c/w home med Aid reports that patient not formally diagnosed with dementia and appears to be at her baseline s/p ED initial treatment. On donepezil 5mg and memantine 10 mg, per aid and son.  AAOX3 upon exam but unable to recall events around her sob episode.  - Does not have capacity to make decisions   - c/w donepezil 5mg qd  - hold memantine until kidneys recover  - HCP Kedar Jose: FULL CODE

## 2023-05-29 NOTE — CONSULT NOTE ADULT - ASSESSMENT
82F PMH HTN, HLD, Emphysema (not on home O2), Asthma, some memory loss, depression presents to ED c/o SOB since 5/24 admitted with increased wob, worsening cough (no change in sputum production noted), admitted for acute respiratory failure     #Increased work of breathing  Tachypneic at rest, with poor air movement on exam, likely 2/2 increased air retention/trapping. Suspect this is 2/2 COPD exacerbation. She is unsure of her home regimen but is prescribed albuterol prn and fluticasone/umeclidinum/vilanterol.   Recommend:  - maintain BiPAP at night (can start with 10/5, 40% settings)  - standing duonebs q6 hrs to help with hyperinflation   - nebs while on BiPAP  - aerobika  - would extend prednisone to at least 7 day course (started on 5/26)  - dispo: transfer to [facility] for closer monitoring on BiPAP.

## 2023-05-29 NOTE — PROGRESS NOTE ADULT - PROBLEM SELECTOR PLAN 1
Presents c/o acute sob X1 day.  EMS reported desat to 80s.  No obvious triggers.  CXR clear.  RVP and COVID negative.  Initially appeared to have improved after starting prednisone 40mg qd, duonebs, albuterol, symbicort.   However 5/27: Pt satting 87-88% on RA, decreased from previous days. On exam, decreased breath sounds b/l w/ b/l wheezing, no audible crackles.   - If continues to have low O2 saturation on RA, will consider repeat CXR today  - Pulm consulted, will f/u recs  - Treat for asthma exacerbation with prednisone 40mg X5 days  - duonebs and albuterol prn  - Symbicort bid Presents c/o acute sob X1 day.  EMS reported desat to 80s.  No obvious triggers.  CXR clear.  RVP and COVID negative. Initially appeared to have improved after starting prednisone 40mg qd, duonebs, albuterol, symbicort.   However 5/27: Pt satting 87-88% on RA, decreased from previous days. On exam, decreased breath sounds b/l w/ b/l wheezing, no audible crackles. Now also with increased green sputum production.   - ICU/pulm consulted  - Bipap for increased WOB  - C/w prednisone 40mg x7 days (until 5/26)  - Standing duonebs and albuterol  - Symbicort bid  - Aerobika device  - Will consider macrolide treatment for possible COPD/asthma exacerbation with increased sputum

## 2023-05-29 NOTE — PROGRESS NOTE ADULT - PROBLEM SELECTOR PLAN 2
Hx of asthma on Trelegy and albuterol prn.  - treatment as above  - follow up with Pulmonologist, Dr. Paula Aguilar ([phone]; direct line [phone]) to discuss recent visit   - obtain PFTs  - no treatment with macrolides indicated at this time, given no increase in sputum volume/viscosity or sputum purulence Hx of asthma on Trelegy and albuterol prn.  - treatment as above  - follow up with Pulmonologist, Dr. Paula Aguilar ([phone]; direct line [phone]) to discuss recent visit

## 2023-05-29 NOTE — PROGRESS NOTE ADULT - PROBLEM SELECTOR PLAN 3
Presented with Cr. 1.44. Baseline is 1.24 with BUN 32, as seen on last labs done outpatient by PCP on May 2022.  BUN/Cr ratio >20. FeUrea 23.8%. FeNa 0.2% - suggests pre-renal. S/p NS 500cc bolus on 5/26 --> Cr 1.55 on 5/27 from 2.05. Today Cr 1.31, almost back to baseline of 1.2.   - encourage PO intake when off Bipap  - trend Cr, consider maintenance fluids if Cr not improving  - follow up with PCP Dr. Cristian Carroll after discharge

## 2023-05-29 NOTE — PROGRESS NOTE ADULT - PROBLEM SELECTOR PLAN 1
Presents c/o acute sob X1 day.  EMS reported desat to 80s.  No obvious triggers.  CXR clear.  RVP and COVID negative. Initially appeared to have improved after starting prednisone 40mg qd, duonebs, albuterol, symbicort.   However 5/27: Pt satting 87-88% on RA, decreased from previous days. On exam, decreased breath sounds b/l w/ b/l wheezing, no audible crackles. Now also with increased green sputum production. Transferred to telemetry for increased work of breathing requiring BiPAP. Repeat RVP negative.  - ABG, f/u results  - Increase steroids to IVP Methylprednisone 30mg BID (Day 1: 5/25)  - Transition from BiPAP to HFNC 40/50 and see how patient tolerates  - Sputum culture, f/u results  - Start Levaquin 500mg Qd (Day 1: 5/29) to cover for pneumonia iso new increased work of breathing  - Bipap overnight  - Standing duonebs Q4 and albuterol as needed  - Symbicort bid  - Aerobika device Presents c/o acute sob X1 day.  EMS reported desat to 80s.  No obvious triggers.  CXR clear.  RVP and COVID negative. Initially appeared to have improved after starting prednisone 40mg qd, duonebs, albuterol, symbicort.   However 5/27: Pt satting 87-88% on RA, decreased from previous days. On exam, decreased breath sounds b/l w/ b/l wheezing, no audible crackles. Now also with increased green sputum production. Transferred to telemetry for increased work of breathing requiring BiPAP. Repeat RVP negative. Bedside pocus w/ no consolidations.   - ABG, f/u results  - Increase steroids to IVP Methylprednisone 30mg BID (Day 1 of steroids 5/25)  - Transition from BiPAP to HFNC 40/50 and see how patient tolerates  - Sputum culture, f/u results  - Start Levaquin 500mg Qd (Day 1: 5/29) to cover for pneumonia iso new increased work of breathing and increased sputum  - Bipap overnight  - Standing duonebs Q4 and albuterol as needed  - Symbicort bid  - Aerobika device

## 2023-05-29 NOTE — PROGRESS NOTE ADULT - ASSESSMENT
81 yo F with PMHx HTN, Emphysema (not on home O2), Asthma, memory loss (newly on memantine 10mg and donepezil 5mg) presents to ED c/o SOB X2 days, presumed asthma exacerbation. Initially improved, but now with increased WOB and tachypnea, with some improvement on Bipap overnight, now being stepped up to tele.

## 2023-05-29 NOTE — CONSULT NOTE ADULT - SUBJECTIVE AND OBJECTIVE BOX
Patient is a 82y old  Female who presents with a chief complaint of sob (28 May 2023 12:13)      Consult reason: increased work of breathing    HPI:  81 yo F with PMHx HTN, HLD, Emphysema (not on home O2), Asthma, memory loss (newly on memantine and donepzeil), depression presents to ED c/o SOB since 5/24 but she does not remember details about what happened.  Her aid at bedside (Loren) stateed that this began one day prior to admission with PT.  Loren was not there but was told that when PT was working with her, her HR and BP both increased and she was short of breath.  On day of admission when was with the patient, she was trying to get the patient to go for a walk but noticed that was more tired and had increased wob and a worsening cough (no change in sputum production noted).  She gave her a nebulizer with minimal relief and since she "didn't like how she looked", she called the ambulance.      Per ED report, EMS reported circumoral cyanosis and tachypnea to 30s and satting mid-80s on RA with temp of 100.  EMS had given her X1 duoneb. Of note, patient is AAOX3 on admission but was unable to recall any details surrounding the event.  Neither she nor the aid could report any triggers, including allergens or signs/symptoms of illness.  Aid reported that patient not formally diagnosed with dementia and appears to be at her baseline s/p ED initial treatment.      In the ED:  Initial vital signs: T: 100 F, HR: 95, BP: 150/83, R: 20, SpO2: 96% on RA  ED course:   Labs: significant for Cr 1.44  Imaging:  CXR: no infiltrates  EKG: sinus tach with PACs  Medications: solumedrol 125mg X1, duoneb X1, Mg 2g  Consults: none  (25 May 2023 20:18)    ROS:  General: (+) aggravated/frustrated/nervous about her condition and being in hospitals; no fevers, chills, or sweats  Pulm: (+) breathing heavily and SOB, no coughing  CV: not complaining of any chest pain  GI: no abd pain, no nausea, vomiting, or diarrhea  : no dysuria      PAST MEDICAL & SURGICAL HISTORY:  HTN (hypertension)      Asthma with COPD      HLD (hyperlipidemia)      Memory loss      S/P hysterectomy          Home Medications:  albuterol 90 mcg/inh inhalation aerosol: 2 puff(s) inhaled every 6 hours as needed for  bronchospasm (26 May 2023 13:30)  ipratropium-albuterol 0.5 mg-2.5 mg/3 mL inhalation solution: 3 milliliter(s) inhaled every 6 hours (26 May 2023 13:30)  rosuvastatin 10 mg oral capsule: 1 orally once a day (at bedtime) (25 May 2023 20:18)  sertraline 50 mg oral tablet: 1 orally once a day (25 May 2023 20:18)  Trelegy Ellipta 100 mcg-62.5 mcg-25 mcg/inh inhalation powder: 1 inhaled once a day (25 May 2023 20:18)  verapamil 120 mg oral tablet: 1 orally once a day (at bedtime) (25 May 2023 20:18)  verapamil 240 mg/12 hours oral tablet, extended release: 1 orally once a day (25 May 2023 20:16)    MEDICATIONS  (STANDING):  albuterol/ipratropium for Nebulization 3 milliLiter(s) Nebulizer every 6 hours  atorvastatin 40 milliGRAM(s) Oral at bedtime  azithromycin   Tablet 500 milliGRAM(s) Oral daily  budesonide  80 MICROgram(s)/formoterol 4.5 MICROgram(s) Inhaler 2 Puff(s) Inhalation every 12 hours  dextrose 5%. 1000 milliLiter(s) (100 mL/Hr) IV Continuous <Continuous>  dextrose 5%. 1000 milliLiter(s) (50 mL/Hr) IV Continuous <Continuous>  dextrose 50% Injectable 25 Gram(s) IV Push once  dextrose 50% Injectable 12.5 Gram(s) IV Push once  dextrose 50% Injectable 25 Gram(s) IV Push once  glucagon  Injectable 1 milliGRAM(s) IntraMuscular once  heparin   Injectable 5000 Unit(s) SubCutaneous every 8 hours  insulin lispro (ADMELOG) corrective regimen sliding scale   SubCutaneous Before meals and at bedtime  sertraline 50 milliGRAM(s) Oral daily  sodium chloride 0.9%. 1000 milliLiter(s) (85 mL/Hr) IV Continuous <Continuous>  verapamil 120 milliGRAM(s) Oral every 24 hours  verapamil  milliGRAM(s) Oral every 24 hours    MEDICATIONS  (PRN):  albuterol    90 MICROgram(s) HFA Inhaler 2 Puff(s) Inhalation every 6 hours PRN Shortness of Breath and/or Wheezing  dextrose Oral Gel 15 Gram(s) Oral once PRN Blood Glucose LESS THAN 70 milliGRAM(s)/deciliter      Allergies    penicillin (Unknown)    Intolerances        SOCIAL HX:       FAMILY HISTORY:  FAMILY HISTORY:  No pertinent family history    :      PHYSICAL EXAM:    ICU Vital Signs Last 24 Hrs  T(C): 36.8 (28 May 2023 20:41), Max: 37 (28 May 2023 17:49)  T(F): 98.3 (28 May 2023 20:41), Max: 98.6 (28 May 2023 17:49)  HR: 94 (28 May 2023 20:41) (89 - 101)  BP: 147/79 (28 May 2023 20:41) (147/79 - 161/79)  BP(mean): --  ABP: --  ABP(mean): --  RR: 28 (28 May 2023 20:03) (18 - 36)  SpO2: 96% (28 May 2023 20:41) (90% - 100%)    O2 Parameters below as of 28 May 2023 20:41  Patient On (Oxygen Delivery Method): BiPAP/CPAP      General: NC/AT, sitting out of bed in chair  HEENT: EOMI; no anterior/posterior cervical or submental MAX  Lungs: (+) decreased breath sounds and air movement especially in posterior mid-inferior lung fields; RR ~30 at time of interview  Cardiovascular: (+) distant heart sounds  Gastrointestinal: abdomen soft, distended   Legs: no edema  Neurological: alert and oriented but nervous        LABS:                          11.1   7.74  )-----------( 210      ( 28 May 2023 05:30 )             35.6                                               05-28    139  |  106  |  46<H>  ----------------------------<  156<H>  4.8   |  24  |  1.42<H>    Ca    9.3      28 May 2023 05:30  Phos  3.5     05-28  Mg     2.6     05-28    TPro  6.3  /  Alb  3.3  /  TBili  0.3  /  DBili  x   /  AST  36  /  ALT  28  /  AlkPhos  76  05-28                                             Urinalysis Basic - ( 27 May 2023 12:36 )    Color: Yellow / Appearance: Clear / SG: >=1.030 / pH: x  Gluc: x / Ketone: NEGATIVE  / Bili: Negative / Urobili: 0.2 E.U./dL   Blood: x / Protein: 30 mg/dL / Nitrite: NEGATIVE   Leuk Esterase: NEGATIVE / RBC: < 5 /HPF / WBC < 5 /HPF   Sq Epi: x / Non Sq Epi: x / Bacteria: Present /HPF                                                  LIVER FUNCTIONS - ( 28 May 2023 05:30 )  Alb: 3.3 g/dL / Pro: 6.3 g/dL / ALK PHOS: 76 U/L / ALT: 28 U/L / AST: 36 U/L / GGT: x                                                                                                                                       
PULMONARY SERVICE INITIAL CONSULT NOTE    HPI:  83 yo F with PMHx HTN, HLD, Emphysema (not on home O2), Asthma, memory loss (newly on memantine and donepzeil), depressionpresents to ED c/o SOB since yesterday but she does not remember details about what happened.  Her aid at bedside states that this began yesterday with PT.  Aid was not there but was told that when PT was working with her, her HR and BP both increased and she was short of breath.  Today when was with the patient, she was trying to get the patient to go for a walk but noticed that was more tired and had iwob and a worsening cough (no change in sputum production noted).  She gave her a nebulizer with minimal relief and since she "didn't like how she looked", she called the ambulence.  Per ED report, EMS reported circumoral cyanosis and tachypnea to 30s and satting mid-80s on RA with temp of 100.  EMS had given her X1 duoneb. Given continued cough pulmonary team was consulted. At time of examination patient is poor historian and unable to recall alot of the questions. She does state that she uses trelegy and follows with a pulmonologist but struggled to recall the name. States that she coughs daily with sputum production with color ranging from clear to green or yellow in coloration.        In the ED:  Initial vital signs: T: 100 F, HR: 95, BP: 150/83, R: 20, SpO2: 96% on RA  ED course:   Labs: significant for Cr 1.44  Imaging:  CXR: no infiltrates  EKG: sinus tach with PACs  Medications: solumedrol 125mg X1, duoneb X1, Mg 2g  Consults: none  (25 May 2023 20:18)      REVIEW OF SYSTEMS:  All additional ROS negative.    PAST MEDICAL & SURGICAL HISTORY:  HTN (hypertension)      Asthma with COPD      HLD (hyperlipidemia)      Memory loss      S/P hysterectomy          FAMILY HISTORY:  No pertinent family history        SOCIAL HISTORY:  Smoking Status: [ ] Current, [ ] Former, [ ] Never  Pack Years:    MEDICATIONS:  Pulmonary:  albuterol    90 MICROgram(s) HFA Inhaler 2 Puff(s) Inhalation every 6 hours PRN  albuterol/ipratropium for Nebulization 3 milliLiter(s) Nebulizer every 6 hours  benzonatate 100 milliGRAM(s) Oral once PRN  budesonide  80 MICROgram(s)/formoterol 4.5 MICROgram(s) Inhaler 2 Puff(s) Inhalation every 12 hours    Antimicrobials:    Anticoagulants:  heparin   Injectable 5000 Unit(s) SubCutaneous every 8 hours    Onc:    GI/:    Endocrine:  atorvastatin 40 milliGRAM(s) Oral at bedtime  dextrose 50% Injectable 25 Gram(s) IV Push once  dextrose 50% Injectable 12.5 Gram(s) IV Push once  dextrose 50% Injectable 25 Gram(s) IV Push once  dextrose Oral Gel 15 Gram(s) Oral once PRN  glucagon  Injectable 1 milliGRAM(s) IntraMuscular once  insulin lispro (ADMELOG) corrective regimen sliding scale   SubCutaneous Before meals and at bedtime  predniSONE   Tablet 40 milliGRAM(s) Oral every 24 hours    Cardiac:  verapamil 120 milliGRAM(s) Oral every 24 hours  verapamil  milliGRAM(s) Oral every 24 hours    Other Medications:  dextrose 5%. 1000 milliLiter(s) IV Continuous <Continuous>  dextrose 5%. 1000 milliLiter(s) IV Continuous <Continuous>  sertraline 50 milliGRAM(s) Oral daily  sodium chloride 0.9%. 1000 milliLiter(s) IV Continuous <Continuous>      Allergies    penicillin (Unknown)    Intolerances        Vital Signs Last 24 Hrs  T(C): 36.5 (27 May 2023 20:46), Max: 36.7 (27 May 2023 06:16)  T(F): 97.7 (27 May 2023 20:46), Max: 98.1 (27 May 2023 06:16)  HR: 89 (27 May 2023 20:46) (80 - 92)  BP: 147/75 (27 May 2023 20:46) (137/73 - 152/72)  BP(mean): --  RR: 18 (27 May 2023 20:46) (17 - 18)  SpO2: 88% (27 May 2023 20:46) (88% - 92%)    Parameters below as of 27 May 2023 20:46  Patient On (Oxygen Delivery Method): room air            PHYSICAL EXAM:    Head: NC/AT  EENT: PERRL, anicteric sclera; oropharynx clear, MMM  Neck: supple, no appreciable JVD  Respiratory: CTA B/L; no W/R/R  Cardiovascular: +S1/S2, RRR  Gastrointestinal: soft, NT/ND  Extremities: WWP; no edema, clubbing or cyanosis  Vascular: 2+ radial pulses B/L  Neurological: awake and alert; HOLLIS    LABS:      CBC Full  -  ( 27 May 2023 07:25 )  WBC Count : 10.57 K/uL  RBC Count : 3.90 M/uL  Hemoglobin : 11.1 g/dL  Hematocrit : 35.3 %  Platelet Count - Automated : 242 K/uL  Mean Cell Volume : 90.5 fl  Mean Cell Hemoglobin : 28.5 pg  Mean Cell Hemoglobin Concentration : 31.4 gm/dL  Auto Neutrophil # : 9.56 K/uL  Auto Lymphocyte # : 0.33 K/uL  Auto Monocyte # : 0.63 K/uL  Auto Eosinophil # : 0.00 K/uL  Auto Basophil # : 0.01 K/uL  Auto Neutrophil % : 90.4 %  Auto Lymphocyte % : 3.1 %  Auto Monocyte % : 6.0 %  Auto Eosinophil % : 0.0 %  Auto Basophil % : 0.1 %    05-27    137  |  106  |  49<H>  ----------------------------<  161<H>  4.2   |  20<L>  |  1.55<H>    Ca    9.4      27 May 2023 07:26  Phos  3.8     05-27  Mg     2.6     05-27            Urinalysis Basic - ( 27 May 2023 12:36 )    Color: Yellow / Appearance: Clear / SG: >=1.030 / pH: x  Gluc: x / Ketone: NEGATIVE  / Bili: Negative / Urobili: 0.2 E.U./dL   Blood: x / Protein: 30 mg/dL / Nitrite: NEGATIVE   Leuk Esterase: NEGATIVE / RBC: < 5 /HPF / WBC < 5 /HPF   Sq Epi: x / Non Sq Epi: x / Bacteria: Present /HPF                RADIOLOGY & ADDITIONAL STUDIES:  < from: Xray Chest 1 View-PORTABLE IMMEDIATE (05.25.23 @ 17:06) >  ACC: [id number] EXAM:  XR CHEST PORTABLE IMMED 1V   ORDERED BY: TOMMY GARCIA     PROCEDURE DATE:  05/25/2023          INTERPRETATION:  Clinical history reason for exam: Shortness of breath.    Frontal chest.    No comparison.    Findings/  impression: Right upper lobe granulomata. Left basilar focal atelectasis.   Heart size within normal limits, thoracic aortic calcification. Thoracic   spine degenerative changes.    --- End of Report ---            CARMEL GAMEZ MD; Attending Radiologist  This document has been electronically signed. May 26 2023  4:53AM    < end of copied text >  
  Patient is a 82y old  Female who presents with a chief complaint of sob (26 May 2023 08:02)      HPI:  81 yo F with PMHx HTN, HLD, Emphysema (not on home O2), Asthma, memory loss (newly on memantine and donepzeil), depressionpresents to ED c/o SOB since yesterday but she does not remember details about what happened.  Her aid at bedside (Loren) states that this began yesterday with PT.  Loren was not there but was told that when PT was working with her, her HR and BP both increased and she was short of breath.  Today when was with the patient, she was trying to get the patient to go for a walk but noticed that was more tired and had iwob and a worsening cough (no change in sputum production noted).  She gave her a nebulizer with minimal relief and since she "didn't like how she looked", she called the ambulence.      Per ED report, EMS reported circumoral cyanosis and tachypnea to 30s and satting mid-80s on RA with temp of 100.  EMS had given her X1 duoneb.    Of note, patient is AAOX3 at time of interview but unable to recall any details surrounding the event.  Neither she nor the aid can report any triggers, including allergens or signs/symptoms of illness.  Aid reports that patient not formally diagnosed with dementia and appears to be at her baseline s/p ED initial treatment.          In the ED:  Initial vital signs: T: 100 F, HR: 95, BP: 150/83, R: 20, SpO2: 96% on RA  ED course:   Labs: significant for Cr 1.44  Imaging:  CXR: no infiltrates  EKG: sinus tach with PACs  Medications: solumedrol 125mg X1, duoneb X1, Mg 2g  Consults: none  (25 May 2023 20:18)    PAST MEDICAL & SURGICAL HISTORY:  HTN (hypertension)      Asthma with COPD      HLD (hyperlipidemia)      Memory loss      S/P hysterectomy        MEDICATIONS  (STANDING):  albuterol/ipratropium for Nebulization 3 milliLiter(s) Nebulizer every 6 hours  atorvastatin 40 milliGRAM(s) Oral at bedtime  budesonide  80 MICROgram(s)/formoterol 4.5 MICROgram(s) Inhaler 2 Puff(s) Inhalation every 12 hours  dextrose 5%. 1000 milliLiter(s) (50 mL/Hr) IV Continuous <Continuous>  dextrose 5%. 1000 milliLiter(s) (100 mL/Hr) IV Continuous <Continuous>  dextrose 50% Injectable 25 Gram(s) IV Push once  dextrose 50% Injectable 12.5 Gram(s) IV Push once  dextrose 50% Injectable 25 Gram(s) IV Push once  glucagon  Injectable 1 milliGRAM(s) IntraMuscular once  heparin   Injectable 5000 Unit(s) SubCutaneous every 8 hours  insulin lispro (ADMELOG) corrective regimen sliding scale   SubCutaneous Before meals and at bedtime  predniSONE   Tablet 40 milliGRAM(s) Oral every 24 hours  sertraline 50 milliGRAM(s) Oral daily  verapamil 120 milliGRAM(s) Oral every 24 hours  verapamil  milliGRAM(s) Oral every 24 hours    MEDICATIONS  (PRN):  albuterol    90 MICROgram(s) HFA Inhaler 2 Puff(s) Inhalation every 6 hours PRN Shortness of Breath and/or Wheezing  dextrose Oral Gel 15 Gram(s) Oral once PRN Blood Glucose LESS THAN 70 milliGRAM(s)/deciliter            FAMILY HISTORY:  No pertinent family history        CBC Full  -  ( 26 May 2023 05:30 )  WBC Count : 3.95 K/uL  RBC Count : 4.42 M/uL  Hemoglobin : 12.6 g/dL  Hematocrit : 39.0 %  Platelet Count - Automated : 232 K/uL  Mean Cell Volume : 88.2 fl  Mean Cell Hemoglobin : 28.5 pg  Mean Cell Hemoglobin Concentration : 32.3 gm/dL  Auto Neutrophil # : 3.54 K/uL  Auto Lymphocyte # : 0.31 K/uL  Auto Monocyte # : 0.09 K/uL  Auto Eosinophil # : 0.00 K/uL  Auto Basophil # : 0.00 K/uL  Auto Neutrophil % : 89.6 %  Auto Lymphocyte % : 7.8 %  Auto Monocyte % : 2.3 %  Auto Eosinophil % : 0.0 %  Auto Basophil % : 0.0 %      05-25    137  |  100  |  31<H>  ----------------------------<  109<H>  4.0   |  24  |  1.44<H>    Ca    9.2      25 May 2023 16:25  Phos  4.6     05-26  Mg     2.9     05-26            Radiology :     < from: Xray Chest 1 View-PORTABLE IMMEDIATE (05.25.23 @ 17:06) >  ACC: [id number] EXAM:  XR CHEST PORTABLE IMMED 1V   ORDERED BY: TOMMY GARCIA     PROCEDURE DATE:  05/25/2023          INTERPRETATION:  Clinical history reason for exam: Shortness of breath.    Frontal chest.    No comparison.    Findings/  impression: Right upper lobe granulomata. Left basilar focal atelectasis.   Heart size within normal limits, thoracic aortic calcification. Thoracic   spine degenerative changes.                  REVIEW OF SYSTEMS:      CONSTITUTIONAL: No fever, weight loss, or fatigue  EYES: No eye pain, visual disturbances, or discharge  ENMT:  No difficulty hearing, tinnitus, vertigo; No sinus or throat pain  NECK: No pain or stiffness  BREASTS: No pain, masses, or nipple discharge  RESPIRATORY:   shortness of breath  CARDIOVASCULAR: No chest pain, palpitations, dizziness, or leg swelling  GASTROINTESTINAL: No abdominal or epigastric pain. No nausea, vomiting, or hematemesis; No diarrhea or constipation. No melena or hematochezia.  GENITOURINARY: No dysuria, frequency, hematuria, or incontinence  NEUROLOGICAL: No headaches, memory loss, loss of strength, numbness, or tremors  SKIN: No itching, burning, rashes, or lesions   LYMPH NODES: No enlarged glands  ENDOCRINE: No heat or cold intolerance; No hair loss  MUSCULOSKELETAL: No joint pain or swelling; No muscle, back, or extremity pain  PSYCHIATRIC: No depression, anxiety, mood swings, or difficulty sleeping  HEME/LYMPH: No easy bruising, or bleeding gums  ALLERGY AND IMMUNOLOGIC: No hives or eczema  VASCULAR: no swelling , erythema        Vital Signs Last 24 Hrs  T(C): 36.6 (26 May 2023 09:01), Max: 37.8 (25 May 2023 16:10)  T(F): 97.8 (26 May 2023 09:01), Max: 100 (25 May 2023 16:10)  HR: 83 (26 May 2023 09:01) (74 - 114)  BP: 124/78 (26 May 2023 09:01) (120/72 - 159/81)  BP(mean): 107 (25 May 2023 23:47) (107 - 107)  RR: 16 (26 May 2023 09:01) (16 - 24)  SpO2: 94% (26 May 2023 09:01) (92% - 98%)    Parameters below as of 25 May 2023 23:47  Patient On (Oxygen Delivery Method): room air            Physical Exam :   82 y o woman lying comfortably in semi Baker's position , awake , alert , no acute complaints     Head : normocephalic , atraumatic    Eyes : PERRLA , EOMI , no nystagmus , sclera anicteric    ENT / FACE : neg nasal discharge , uvula midline , no oropharyngeal erythema / exudate    Neck : supple , negative JVD , negative carotid bruits , no thyromegaly    Chest : CTA bilaterally , neg wheeze / rhonchi / rales / crackles / egophany    Cardiovascular : regular rate and rhythm , neg murmurs / rubs / gallops    Abdomen : soft , non distended , non tender to palpation in all 4 quadrants ,  normal bowel sounds     Extremities : WWP , neg cyanosis /clubbing / edema      Neurologic Exam :    Alert and oriented x 3     Motor Exam:          Right UE:               no focal weakness ,  > 3+/5 throughout      Left UE:                 no focal weakness ,  > 3+/5 throughout          Right LE:    no focal weakness ,  > 3+/5 throughout        Left LE:    no focal weakness ,  > 3+/5 throughout         Sensation :         intact to light touch x 4 extremities                                   DTR :     biceps/brachioradialis : equal                      patella/ankle : equal        Gait :  not tested          PM&R Impression :     admitted for c/o SOB         Recommendations / Plan :              1) Physical / Occupational therapy focusing on therapeutic exercises , equipment evaluation , bed mobility/transfer out of bed evaluation , progressive ambulation with assistive devices prn .    2) Current disposition plan recommendation  :  probable d/c home  , +/- home P.T.         
Patient is a 82y old  Female who presents with a chief complaint of sob (28 May 2023 12:13)      Consult reason: increased work of breathing    HPI:  81 yo F with PMHx HTN, HLD, Emphysema (not on home O2), Asthma, memory loss (newly on memantine and donepzeil), depression presents to ED c/o SOB since 5/24 but she does not remember details about what happened.  Her aid at bedside (Loren) stateed that this began one day prior to admission with PT.  Loren was not there but was told that when PT was working with her, her HR and BP both increased and she was short of breath.  On day of admission when was with the patient, she was trying to get the patient to go for a walk but noticed that was more tired and had increased wob and a worsening cough (no change in sputum production noted).  She gave her a nebulizer with minimal relief and since she "didn't like how she looked", she called the ambulance.      Per ED report, EMS reported circumoral cyanosis and tachypnea to 30s and satting mid-80s on RA with temp of 100.  EMS had given her X1 duoneb. Of note, patient is AAOX3 on admission but was unable to recall any details surrounding the event.  Neither she nor the aid could report any triggers, including allergens or signs/symptoms of illness.  Aid reported that patient not formally diagnosed with dementia and appears to be at her baseline s/p ED initial treatment.      In the ED:  Initial vital signs: T: 100 F, HR: 95, BP: 150/83, R: 20, SpO2: 96% on RA  ED course:   Labs: significant for Cr 1.44  Imaging:  CXR: no infiltrates  EKG: sinus tach with PACs  Medications: solumedrol 125mg X1, duoneb X1, Mg 2g  Consults: none  (25 May 2023 20:18)    ROS:  General: (+) aggravated/frustrated/nervous about her condition and being in hospitals; no fevers, chills, or sweats  Pulm: (+) breathing heavily and SOB, no coughing  CV: not complaining of any chest pain  GI: no abd pain, no nausea, vomiting, or diarrhea  : no dysuria      PAST MEDICAL & SURGICAL HISTORY:  HTN (hypertension)      Asthma with COPD      HLD (hyperlipidemia)      Memory loss      S/P hysterectomy          Home Medications:  albuterol 90 mcg/inh inhalation aerosol: 2 puff(s) inhaled every 6 hours as needed for  bronchospasm (26 May 2023 13:30)  ipratropium-albuterol 0.5 mg-2.5 mg/3 mL inhalation solution: 3 milliliter(s) inhaled every 6 hours (26 May 2023 13:30)  rosuvastatin 10 mg oral capsule: 1 orally once a day (at bedtime) (25 May 2023 20:18)  sertraline 50 mg oral tablet: 1 orally once a day (25 May 2023 20:18)  Trelegy Ellipta 100 mcg-62.5 mcg-25 mcg/inh inhalation powder: 1 inhaled once a day (25 May 2023 20:18)  verapamil 120 mg oral tablet: 1 orally once a day (at bedtime) (25 May 2023 20:18)  verapamil 240 mg/12 hours oral tablet, extended release: 1 orally once a day (25 May 2023 20:16)    MEDICATIONS  (STANDING):  albuterol/ipratropium for Nebulization 3 milliLiter(s) Nebulizer every 6 hours  atorvastatin 40 milliGRAM(s) Oral at bedtime  azithromycin   Tablet 500 milliGRAM(s) Oral daily  budesonide  80 MICROgram(s)/formoterol 4.5 MICROgram(s) Inhaler 2 Puff(s) Inhalation every 12 hours  dextrose 5%. 1000 milliLiter(s) (100 mL/Hr) IV Continuous <Continuous>  dextrose 5%. 1000 milliLiter(s) (50 mL/Hr) IV Continuous <Continuous>  dextrose 50% Injectable 25 Gram(s) IV Push once  dextrose 50% Injectable 12.5 Gram(s) IV Push once  dextrose 50% Injectable 25 Gram(s) IV Push once  glucagon  Injectable 1 milliGRAM(s) IntraMuscular once  heparin   Injectable 5000 Unit(s) SubCutaneous every 8 hours  insulin lispro (ADMELOG) corrective regimen sliding scale   SubCutaneous Before meals and at bedtime  sertraline 50 milliGRAM(s) Oral daily  sodium chloride 0.9%. 1000 milliLiter(s) (85 mL/Hr) IV Continuous <Continuous>  verapamil 120 milliGRAM(s) Oral every 24 hours  verapamil  milliGRAM(s) Oral every 24 hours    MEDICATIONS  (PRN):  albuterol    90 MICROgram(s) HFA Inhaler 2 Puff(s) Inhalation every 6 hours PRN Shortness of Breath and/or Wheezing  dextrose Oral Gel 15 Gram(s) Oral once PRN Blood Glucose LESS THAN 70 milliGRAM(s)/deciliter      Allergies    penicillin (Unknown)    Intolerances        SOCIAL HX:       FAMILY HISTORY:  FAMILY HISTORY:  No pertinent family history    :      PHYSICAL EXAM:    ICU Vital Signs Last 24 Hrs  T(C): 36.8 (29 May 2023 06:07), Max: 37 (28 May 2023 17:49)  T(F): 98.3 (29 May 2023 06:07), Max: 98.6 (28 May 2023 17:49)  HR: 96 (29 May 2023 06:33) (88 - 119)  BP: 152/76 (29 May 2023 06:07) (147/79 - 161/79)  BP(mean): --  ABP: --  ABP(mean): --  RR: 28 (29 May 2023 06:33) (22 - 36)  SpO2: 98% (29 May 2023 06:33) (91% - 100%)    O2 Parameters below as of 29 May 2023 06:33  Patient On (Oxygen Delivery Method): BiPAP/CPAP    O2 Concentration (%): 40    General: NC/AT, lying in bed in NAD  HEENT: EOMI  Lungs: (+) decreased breath sounds and air movement especially in posterior mid-inferior lung fields  Cardiovascular: (+) distant heart sounds  Gastrointestinal: abdomen soft, distended   Legs: no edema  Neurological: alert and oriented but nervous        LABS:                          10.1   8.48  )-----------( 199      ( 29 May 2023 05:30 )             32.1                                               05-29    138  |  106  |  44<H>  ----------------------------<  147<H>  5.2   |  22  |  1.31<H>    Ca    9.7      29 May 2023 05:30  Phos  3.8     05-29  Mg     2.5     05-29    TPro  5.9<L>  /  Alb  2.7<L>  /  TBili  0.4  /  DBili  x   /  AST  33  /  ALT  29  /  AlkPhos  69  05-29                                             Urinalysis Basic - ( 27 May 2023 12:36 )    Color: Yellow / Appearance: Clear / SG: >=1.030 / pH: x  Gluc: x / Ketone: NEGATIVE  / Bili: Negative / Urobili: 0.2 E.U./dL   Blood: x / Protein: 30 mg/dL / Nitrite: NEGATIVE   Leuk Esterase: NEGATIVE / RBC: < 5 /HPF / WBC < 5 /HPF   Sq Epi: x / Non Sq Epi: x / Bacteria: Present /HPF                                                  LIVER FUNCTIONS - ( 29 May 2023 05:30 )  Alb: 2.7 g/dL / Pro: 5.9 g/dL / ALK PHOS: 69 U/L / ALT: 29 U/L / AST: 33 U/L / GGT: x

## 2023-05-30 DIAGNOSIS — D64.9 ANEMIA, UNSPECIFIED: ICD-10-CM

## 2023-05-30 LAB
ALBUMIN SERPL ELPH-MCNC: 2.9 G/DL — LOW (ref 3.3–5)
ALP SERPL-CCNC: 69 U/L — SIGNIFICANT CHANGE UP (ref 40–120)
ALT FLD-CCNC: 28 U/L — SIGNIFICANT CHANGE UP (ref 10–45)
ANION GAP SERPL CALC-SCNC: 8 MMOL/L — SIGNIFICANT CHANGE UP (ref 5–17)
AST SERPL-CCNC: 33 U/L — SIGNIFICANT CHANGE UP (ref 10–40)
BASOPHILS # BLD AUTO: 0.01 K/UL — SIGNIFICANT CHANGE UP (ref 0–0.2)
BASOPHILS NFR BLD AUTO: 0.1 % — SIGNIFICANT CHANGE UP (ref 0–2)
BILIRUB SERPL-MCNC: 0.3 MG/DL — SIGNIFICANT CHANGE UP (ref 0.2–1.2)
BUN SERPL-MCNC: 44 MG/DL — HIGH (ref 7–23)
CALCIUM SERPL-MCNC: 9.7 MG/DL — SIGNIFICANT CHANGE UP (ref 8.4–10.5)
CHLORIDE SERPL-SCNC: 108 MMOL/L — SIGNIFICANT CHANGE UP (ref 96–108)
CO2 SERPL-SCNC: 23 MMOL/L — SIGNIFICANT CHANGE UP (ref 22–31)
CREAT SERPL-MCNC: 1.31 MG/DL — HIGH (ref 0.5–1.3)
EGFR: 41 ML/MIN/1.73M2 — LOW
EOSINOPHIL # BLD AUTO: 0 K/UL — SIGNIFICANT CHANGE UP (ref 0–0.5)
EOSINOPHIL NFR BLD AUTO: 0 % — SIGNIFICANT CHANGE UP (ref 0–6)
GLUCOSE BLDC GLUCOMTR-MCNC: 134 MG/DL — HIGH (ref 70–99)
GLUCOSE BLDC GLUCOMTR-MCNC: 147 MG/DL — HIGH (ref 70–99)
GLUCOSE BLDC GLUCOMTR-MCNC: 149 MG/DL — HIGH (ref 70–99)
GLUCOSE BLDC GLUCOMTR-MCNC: 153 MG/DL — HIGH (ref 70–99)
GLUCOSE SERPL-MCNC: 148 MG/DL — HIGH (ref 70–99)
HCT VFR BLD CALC: 31.9 % — LOW (ref 34.5–45)
HGB BLD-MCNC: 9.7 G/DL — LOW (ref 11.5–15.5)
IMM GRANULOCYTES NFR BLD AUTO: 0.6 % — SIGNIFICANT CHANGE UP (ref 0–0.9)
LYMPHOCYTES # BLD AUTO: 0.33 K/UL — LOW (ref 1–3.3)
LYMPHOCYTES # BLD AUTO: 4 % — LOW (ref 13–44)
MAGNESIUM SERPL-MCNC: 2.5 MG/DL — SIGNIFICANT CHANGE UP (ref 1.6–2.6)
MCHC RBC-ENTMCNC: 27.9 PG — SIGNIFICANT CHANGE UP (ref 27–34)
MCHC RBC-ENTMCNC: 30.4 GM/DL — LOW (ref 32–36)
MCV RBC AUTO: 91.7 FL — SIGNIFICANT CHANGE UP (ref 80–100)
MONOCYTES # BLD AUTO: 0.28 K/UL — SIGNIFICANT CHANGE UP (ref 0–0.9)
MONOCYTES NFR BLD AUTO: 3.4 % — SIGNIFICANT CHANGE UP (ref 2–14)
MRSA PCR RESULT.: NEGATIVE — SIGNIFICANT CHANGE UP
NEUTROPHILS # BLD AUTO: 7.58 K/UL — HIGH (ref 1.8–7.4)
NEUTROPHILS NFR BLD AUTO: 91.9 % — HIGH (ref 43–77)
NRBC # BLD: 0 /100 WBCS — SIGNIFICANT CHANGE UP (ref 0–0)
PHOSPHATE SERPL-MCNC: 4 MG/DL — SIGNIFICANT CHANGE UP (ref 2.5–4.5)
PLATELET # BLD AUTO: 213 K/UL — SIGNIFICANT CHANGE UP (ref 150–400)
POTASSIUM SERPL-MCNC: 4.9 MMOL/L — SIGNIFICANT CHANGE UP (ref 3.5–5.3)
POTASSIUM SERPL-SCNC: 4.9 MMOL/L — SIGNIFICANT CHANGE UP (ref 3.5–5.3)
PROT SERPL-MCNC: 6 G/DL — SIGNIFICANT CHANGE UP (ref 6–8.3)
RBC # BLD: 3.48 M/UL — LOW (ref 3.8–5.2)
RBC # FLD: 16 % — HIGH (ref 10.3–14.5)
S AUREUS DNA NOSE QL NAA+PROBE: POSITIVE
SODIUM SERPL-SCNC: 139 MMOL/L — SIGNIFICANT CHANGE UP (ref 135–145)
WBC # BLD: 8.25 K/UL — SIGNIFICANT CHANGE UP (ref 3.8–10.5)
WBC # FLD AUTO: 8.25 K/UL — SIGNIFICANT CHANGE UP (ref 3.8–10.5)

## 2023-05-30 PROCEDURE — 99232 SBSQ HOSP IP/OBS MODERATE 35: CPT | Mod: GC

## 2023-05-30 PROCEDURE — 99232 SBSQ HOSP IP/OBS MODERATE 35: CPT

## 2023-05-30 PROCEDURE — 99233 SBSQ HOSP IP/OBS HIGH 50: CPT | Mod: GC

## 2023-05-30 RX ORDER — VANCOMYCIN HCL 1 G
750 VIAL (EA) INTRAVENOUS EVERY 24 HOURS
Refills: 0 | Status: DISCONTINUED | OUTPATIENT
Start: 2023-05-30 | End: 2023-05-30

## 2023-05-30 RX ORDER — VERAPAMIL HCL 240 MG
120 CAPSULE, EXTENDED RELEASE PELLETS 24 HR ORAL EVERY 24 HOURS
Refills: 0 | Status: DISCONTINUED | OUTPATIENT
Start: 2023-05-30 | End: 2023-06-02

## 2023-05-30 RX ORDER — VERAPAMIL HCL 240 MG
240 CAPSULE, EXTENDED RELEASE PELLETS 24 HR ORAL EVERY 24 HOURS
Refills: 0 | Status: DISCONTINUED | OUTPATIENT
Start: 2023-05-31 | End: 2023-06-02

## 2023-05-30 RX ORDER — POLYETHYLENE GLYCOL 3350 17 G/17G
17 POWDER, FOR SOLUTION ORAL DAILY
Refills: 0 | Status: DISCONTINUED | OUTPATIENT
Start: 2023-05-30 | End: 2023-06-02

## 2023-05-30 RX ORDER — SENNA PLUS 8.6 MG/1
1 TABLET ORAL AT BEDTIME
Refills: 0 | Status: DISCONTINUED | OUTPATIENT
Start: 2023-05-30 | End: 2023-06-02

## 2023-05-30 RX ADMIN — BUDESONIDE AND FORMOTEROL FUMARATE DIHYDRATE 2 PUFF(S): 160; 4.5 AEROSOL RESPIRATORY (INHALATION) at 21:38

## 2023-05-30 RX ADMIN — SERTRALINE 50 MILLIGRAM(S): 25 TABLET, FILM COATED ORAL at 11:59

## 2023-05-30 RX ADMIN — HEPARIN SODIUM 5000 UNIT(S): 5000 INJECTION INTRAVENOUS; SUBCUTANEOUS at 15:17

## 2023-05-30 RX ADMIN — SENNA PLUS 1 TABLET(S): 8.6 TABLET ORAL at 21:37

## 2023-05-30 RX ADMIN — Medication 120 MILLIGRAM(S): at 19:25

## 2023-05-30 RX ADMIN — HEPARIN SODIUM 5000 UNIT(S): 5000 INJECTION INTRAVENOUS; SUBCUTANEOUS at 21:38

## 2023-05-30 RX ADMIN — HEPARIN SODIUM 5000 UNIT(S): 5000 INJECTION INTRAVENOUS; SUBCUTANEOUS at 06:04

## 2023-05-30 RX ADMIN — Medication 240 MILLIGRAM(S): at 06:47

## 2023-05-30 RX ADMIN — Medication 1: at 22:46

## 2023-05-30 RX ADMIN — ATORVASTATIN CALCIUM 40 MILLIGRAM(S): 80 TABLET, FILM COATED ORAL at 21:38

## 2023-05-30 RX ADMIN — BUDESONIDE AND FORMOTEROL FUMARATE DIHYDRATE 2 PUFF(S): 160; 4.5 AEROSOL RESPIRATORY (INHALATION) at 09:30

## 2023-05-30 RX ADMIN — Medication 3 MILLILITER(S): at 00:07

## 2023-05-30 RX ADMIN — Medication 25 MILLIGRAM(S): at 19:25

## 2023-05-30 RX ADMIN — Medication 30 MILLIGRAM(S): at 06:04

## 2023-05-30 RX ADMIN — POLYETHYLENE GLYCOL 3350 17 GRAM(S): 17 POWDER, FOR SOLUTION ORAL at 16:48

## 2023-05-30 RX ADMIN — DONEPEZIL HYDROCHLORIDE 5 MILLIGRAM(S): 10 TABLET, FILM COATED ORAL at 21:38

## 2023-05-30 RX ADMIN — Medication 3 MILLILITER(S): at 07:28

## 2023-05-30 RX ADMIN — Medication 3 MILLILITER(S): at 04:42

## 2023-05-30 RX ADMIN — Medication 3 MILLILITER(S): at 12:03

## 2023-05-30 RX ADMIN — Medication 3 MILLILITER(S): at 16:09

## 2023-05-30 NOTE — PROGRESS NOTE ADULT - PROBLEM SELECTOR PLAN 5
Hx of emphysema on inhalers as above- Trelegy and albuterol prn. Patient w/ MAT, likely due to hx of asthma. Home meds: verapamil 240mg qAM and 120mg qPM  - c/w home meds

## 2023-05-30 NOTE — PROGRESS NOTE ADULT - PROBLEM SELECTOR PLAN 2
Hx of asthma on Trelegy and albuterol prn.  - treatment as above  - follows with Pulmonologist, Dr. Paula Aguilar ([phone]; direct line [phone])

## 2023-05-30 NOTE — PROGRESS NOTE ADULT - PROBLEM SELECTOR PLAN 3
Presented with Cr. 1.44. Baseline is 1.24 with BUN 32, as seen on last labs done outpatient by PCP on May 2022.  BUN/Cr ratio >20. FeUrea 23.8%. FeNa 0.2% - suggests pre-renal. S/p NS 500cc bolus on 5/26 --> Cr 1.55 on 5/27 from 2.05. Today Cr 1.31, almost back to baseline of 1.2.   - encourage PO intake when off Bipap  - trend Cr, consider maintenance fluids if Cr not improving  - follow up with PCP Dr. Cristina Carroll after discharge Presented with Cr. 1.44. Baseline is 1.24 with BUN 32, as seen on last labs done outpatient by PCP on May 2022.  BUN/Cr ratio >20. FeUrea 23.8%. FeNa 0.2% - suggests pre-renal. S/p NS 500cc bolus on 5/26 --> Cr 1.55 on 5/27 from 2.05. Today Cr 1.31, almost back to baseline of 1.2.   - encourage PO intake when off Bipap  - trend Cr, consider maintenance fluids if Cr not improving  - follow up with PCP Dr. Layton Carroll after discharge- [phone] Presented with Cr. 1.44. Baseline is 1.24 with BUN 32, as seen on last labs done outpatient by PCP on May 2022.  BUN/Cr ratio >20. FeUrea 23.8%. FeNa 0.2% - suggests pre-renal. S/p NS 500cc bolus on 5/26 --> Cr 1.55 on 5/27 from 2.05. Today Cr 1.31, almost back to baseline of 1.2.   - encourage PO intake   - trend Cr, consider maintenance fluids if Cr not improving  - follow up with PCP Dr. Layton Carroll after discharge- [phone]

## 2023-05-30 NOTE — PROGRESS NOTE ADULT - ASSESSMENT
82 year old admitted for increased work of breathing at PT and pulmonary called for continued cough. Patient being treated for COPD exacerbation with visible hyperinflation of the lungs on CXR    Outpatient pulm: Dr. Johnson  Meds: Breo, montelukast  PFTS: FEV1/FVC 49, FEV1 50%, FVC 77%,  %, %, DLCO 63%    COPD exacerbation  Hyperinflation  Cough    Patient chest imaging shows clear hyperinflation of the lungs with some small visible dilated airways. Patient likely has poor clearance of secretions in  the setting of bronchiectasis from her chronic obstructive airways disease. Agree with asteroids and would give duonebs standing as well as airway clearance with aerobika. She has strong cough so does not need hypertonic saline but has very poor movement of air likely from impacted airways with mucus which aerobika will mobilize. Preliminary sputum culture with GPC, GNR, GPR. moderate WBCs.  82 year old admitted for increased work of breathing at PT and pulmonary called for continued cough. Patient being treated for COPD exacerbation with visible hyperinflation of the lungs on CXR    Outpatient pulm: Dr. Johnson  Meds: Breo, montelukast  PFTS: FEV1/FVC 49, FEV1 50%, FVC 77%,  %, %, DLCO 63%    COPD exacerbation  Hyperinflation  Cough    Patient chest imaging shows clear hyperinflation of the lungs with some small visible dilated airways. Patient likely has poor clearance of secretions in  the setting of bronchiectasis from her chronic obstructive airways disease. Agree with asteroids and would give duonebs standing as well as airway clearance with aerobika. She has strong cough so does not need hypertonic saline but has very poor movement of air likely from impacted airways with mucus which aerobika will mobilize. Preliminary sputum culture with GPC, GNR, GPR. moderate WBCs with speciation showing staph. aureus now.   -  82 year old admitted for increased work of breathing at PT and pulmonary called for continued cough. Patient being treated for COPD exacerbation with visible hyperinflation of the lungs on CXR    Outpatient pulm: Dr. Johnson  Meds: Breo, montelukast  PFTS: FEV1/FVC 49, FEV1 50%, FVC 77%,  %, %, DLCO 63%    COPD exacerbation  Hyperinflation  Cough    Patient chest imaging shows clear hyperinflation of the lungs with some small visible dilated airways. Patient likely has poor clearance of secretions in  the setting of bronchiectasis from her chronic obstructive airways disease. Agree with asteroids and would give duonebs standing as well as airway clearance with aerobika. She has strong cough so does not need hypertonic saline but has very poor movement of air likely from impacted airways with mucus which aerobika will mobilize. Preliminary sputum culture with GPC, GNR, GPR. moderate WBCs with speciation showing staph. aureus now on NC.  - Agree with empiric vacomycin pending sensitivites, f/u MRSA  - c/w steroids, now on solumedrol-antipicate switching to po prednisone in a day  - c/w duonebs q4hr  - c/w levofloxacin  - Maintain spo2 > 88% 82 year old admitted for increased work of breathing at PT and pulmonary called for continued cough. Patient being treated for COPD exacerbation with visible hyperinflation of the lungs on CXR    Outpatient pulm: Dr. Johnson  Meds: Breo, montelukast  PFTS: FEV1/FVC 49, FEV1 50%, FVC 77%,  %, %, DLCO 63%    COPD exacerbation  Hyperinflation  Cough    Patient chest imaging shows clear hyperinflation of the lungs with some small visible dilated airways. Patient likely has poor clearance of secretions in  the setting of bronchiectasis from her chronic obstructive airways disease. Agree with asteroids and would give duonebs standing as well as airway clearance with aerobika. She has strong cough so does not need hypertonic saline but has very poor movement of air likely from impacted airways with mucus which aerobika will mobilize. Preliminary sputum culture with GPC, GNR, GPR. moderate WBCs with speciation showing staph. aureus now on NC.  - Agree with empiric vacomycin pending sensitivites, f/u MRSA  - c/w steroids, now on solumedrol-antipicate switching to po prednisone in a day  - c/w duonebs q4hr  - Should be on NIV overnight  - c/w levofloxacin  - Maintain spo2 > 88%

## 2023-05-30 NOTE — PROGRESS NOTE ADULT - ASSESSMENT
81 yo F with PMHx HTN, Emphysema (not on home O2), Asthma, memory loss (newly on memantine 10mg and donepezil 5mg) presents to ED c/o SOB X2 days, presumed asthma exacerbation, ICU consulted for iWOB requiring temporary BIPAP and escalation to telemetry care 5/29, started on treatment for COPD exacerbation with improvement and weaning of O2 requirements, deemed appropriate for stepdown to medical floors

## 2023-05-30 NOTE — PROGRESS NOTE ADULT - SUBJECTIVE AND OBJECTIVE BOX
PULMONARY CONSULT SERVICE FOLLOW-UP NOTE    INTERVAL HPI:  Reviewed chart and overnight events; patient seen and examined at bedside. Patient seen and examined this am. Reports overall improvement in cough/dyspnea.    MEDICATIONS:  Pulmonary:  albuterol    90 MICROgram(s) HFA Inhaler 2 Puff(s) Inhalation every 6 hours PRN  albuterol/ipratropium for Nebulization 3 milliLiter(s) Nebulizer every 4 hours  budesonide  80 MICROgram(s)/formoterol 4.5 MICROgram(s) Inhaler 2 Puff(s) Inhalation every 12 hours  guaiFENesin Oral Liquid (Sugar-Free) 100 milliGRAM(s) Oral every 6 hours PRN    Antimicrobials:  levoFLOXacin IVPB      levoFLOXacin IVPB 500 milliGRAM(s) IV Intermittent every 24 hours    Anticoagulants:  heparin   Injectable 5000 Unit(s) SubCutaneous every 8 hours    Cardiac:  verapamil 240 milliGRAM(s) Oral every 24 hours  verapamil 120 milliGRAM(s) Oral every 24 hours      Allergies    penicillin (Unknown)    Intolerances        Vital Signs Last 24 Hrs  T(C): 37.1 (30 May 2023 09:10), Max: 37.1 (30 May 2023 09:10)  T(F): 98.7 (30 May 2023 09:10), Max: 98.7 (30 May 2023 09:10)  HR: 78 (30 May 2023 04:50) (73 - 112)  BP: 143/65 (30 May 2023 03:55) (135/70 - 161/85)  BP(mean): 94 (30 May 2023 03:55) (86 - 112)  RR: 20 (30 May 2023 04:50) (18 - 25)  SpO2: 99% (30 May 2023 04:50) (97% - 100%)    Parameters below as of 30 May 2023 04:50  Patient On (Oxygen Delivery Method): nasal cannula, high flow  O2 Flow (L/min): 40  O2 Concentration (%): 40    05-29 @ 07:01  -  05-30 @ 07:00  --------------------------------------------------------  IN: 1005 mL / OUT: 250 mL / NET: 755 mL    05-30 @ 07:01  -  05-30 @ 10:46  --------------------------------------------------------  IN: 450 mL / OUT: 0 mL / NET: 450 mL          PHYSICAL EXAM:  Constitutional: Sitting in bed. on HFNC. Able to converse  HEENT: MMM  Neck: -JVD  Cardiovascular: +S1/S2, RRR  Respiratory: Poor air movement  Gastrointestinal: soft, NT/ND  Extremities: WWP; no edema  Vascular: 2+ radial pulses B/L  Neurological: awake and alert; HOLLIS    LABS:  ABG - ( 29 May 2023 15:42 )  pH, Arterial: 7.47  pH, Blood: x     /  pCO2: 31    /  pO2: 119   / HCO3: 23    / Base Excess: -0.2  /  SaO2: 99.4                CBC Full  -  ( 30 May 2023 05:19 )  WBC Count : 8.25 K/uL  RBC Count : 3.48 M/uL  Hemoglobin : 9.7 g/dL  Hematocrit : 31.9 %  Platelet Count - Automated : 213 K/uL  Mean Cell Volume : 91.7 fl  Mean Cell Hemoglobin : 27.9 pg  Mean Cell Hemoglobin Concentration : 30.4 gm/dL  Auto Neutrophil # : 7.58 K/uL  Auto Lymphocyte # : 0.33 K/uL  Auto Monocyte # : 0.28 K/uL  Auto Eosinophil # : 0.00 K/uL  Auto Basophil # : 0.01 K/uL  Auto Neutrophil % : 91.9 %  Auto Lymphocyte % : 4.0 %  Auto Monocyte % : 3.4 %  Auto Eosinophil % : 0.0 %  Auto Basophil % : 0.1 %    05-30    139  |  108  |  44<H>  ----------------------------<  148<H>  4.9   |  23  |  1.31<H>    Ca    9.7      30 May 2023 05:19  Phos  4.0     05-30  Mg     2.5     05-30    TPro  6.0  /  Alb  2.9<L>  /  TBili  0.3  /  DBili  x   /  AST  33  /  ALT  28  /  AlkPhos  69  05-30                      RADIOLOGY & ADDITIONAL STUDIES:

## 2023-05-30 NOTE — PROGRESS NOTE ADULT - PROBLEM SELECTOR PLAN 8
Hx of HTN on verapamil SR 240mg qAM and 120mg qPM  - c/w home meds Hx of depression on sertraline 50mg qd.  - c/w home med

## 2023-05-30 NOTE — PROGRESS NOTE ADULT - PROBLEM SELECTOR PLAN 7
Hx of depression on sertraline 50mg qd.  - c/w home med Aid reports that patient not formally diagnosed with dementia and appears to be at her baseline s/p ED initial treatment. On donepezil 5mg and memantine 10 mg BID per pharmacy and son.  AAOX3 upon exam but unable to recall events around her sob episode.  - Does not have capacity to make decisions   - c/w donepezil 5mg qd  - Holding memantine d/t YUNI - can likely resume tomorrow if Cr back to baseline, improving   - HCP Kedar Jose: FULL CODE

## 2023-05-30 NOTE — PROGRESS NOTE ADULT - PROBLEM SELECTOR PLAN 2
Hx of asthma on Trelegy and albuterol prn.  - treatment as above  - follows with Pulmonologist, Dr. Paula Aguilar ([phone]; direct line [phone])

## 2023-05-30 NOTE — PROGRESS NOTE ADULT - SUBJECTIVE AND OBJECTIVE BOX
***Acceptance of transfer from [facility] --> [facility]***    HOSPITAL COURSE: 81 yo F with PMHx HTN, Emphysema (not on home O2), Asthma, memory loss (newly on memantine 10mg and donepezil 5mg) presents to ED c/o SOB X2 days, admitted for COPD exacerbation. Initially improved on RMF, however 5/29, ICU consulted for increased WOB, tachypnea, with improvement on Bipap overnight, stepped up to telemetry for increasing respiratory requirements. Started on Levaquin 500 mg QD for On 5/30, pt weaned from HFNC to 6L NC --> 2L NC. Solumedrol was decreased to 25 BID from TID (D1 of steroids 5/25). Sputum Cx growing mixed viktoriya. Confirmed home verapamil 240 SR qd, restarted for tomorrow and discontinued all other orders. Sputum Cx growing moderate staph aureus, pending MRSA swab, started on empiric vancomycin pending final speciation. Weaned to 2L NC, satting mid 90s. Deemed appropriate for stepdown to RMF.     OVERNIGHT EVENTS:  NAEON    SUBJECTIVE / INTERVAL HPI: Patient seen and examined at bedside. On exam, pt continued to report improved breathing without work of breathing. Breathing comfortable on exam, not tachypneic. On questioning. States has not had a bowel movement since admission. She denies headache, fever, chills, nausea, chest pain. ROS otherwise negative     VITAL SIGNS:  Vital Signs Last 24 Hrs  T(C): 37.2 (30 May 2023 13:54), Max: 37.2 (30 May 2023 13:54)  T(F): 99 (30 May 2023 13:54), Max: 99 (30 May 2023 13:54)  HR: 86 (30 May 2023 13:30) (73 - 112)  BP: 121/59 (30 May 2023 13:30) (117/55 - 157/78)  BP(mean): 82 (30 May 2023 13:30) (79 - 109)  RR: 242 (30 May 2023 13:30) (20 - 242)  SpO2: 94% (30 May 2023 13:30) (94% - 100%)    Parameters below as of 30 May 2023 13:30  Patient On (Oxygen Delivery Method): nasal cannula  O2 Flow (L/min): 2      PHYSICAL EXAM:  Constitutional: NAD, resting in bed;  HEENT: NC/AT, anicteric sclera, dry MM  Neck: supple; no JVD or thyromegaly  Respiratory: distant breath sounds, mild crackles at lower lung fields bilaterally, no wheezing, no increased work of breathing, on NC  Cardiac: +S1/S2; regular rate with extra beats, no murmurs  Gastrointestinal: soft, NT/ND; no rebound or guarding; +BS  Extremities: WWP, no clubbing or cyanosis; no peripheral edema  Musculoskeletal: NROM x4;   Vascular: 2+ radial pulses B/L  Dermatologic: skin warm, dry and intact; no rashes, wounds, or scars  Neurologic: AAOx2-3 (baseline dementia, currently mentating at baseline per aide bedside), no focal deficits  Psychiatric: calm, cooperative, behaviors are appropriate    MEDICATIONS:  MEDICATIONS  (STANDING):  albuterol/ipratropium for Nebulization 3 milliLiter(s) Nebulizer every 4 hours  atorvastatin 40 milliGRAM(s) Oral at bedtime  budesonide  80 MICROgram(s)/formoterol 4.5 MICROgram(s) Inhaler 2 Puff(s) Inhalation every 12 hours  dextrose 5%. 1000 milliLiter(s) (50 mL/Hr) IV Continuous <Continuous>  dextrose 5%. 1000 milliLiter(s) (100 mL/Hr) IV Continuous <Continuous>  dextrose 50% Injectable 25 Gram(s) IV Push once  dextrose 50% Injectable 12.5 Gram(s) IV Push once  dextrose 50% Injectable 25 Gram(s) IV Push once  donepezil 5 milliGRAM(s) Oral at bedtime  glucagon  Injectable 1 milliGRAM(s) IntraMuscular once  heparin   Injectable 5000 Unit(s) SubCutaneous every 8 hours  insulin lispro (ADMELOG) corrective regimen sliding scale   SubCutaneous Before meals and at bedtime  levoFLOXacin IVPB      levoFLOXacin IVPB 500 milliGRAM(s) IV Intermittent every 24 hours  methylPREDNISolone sodium succinate Injectable 25 milliGRAM(s) IV Push every 12 hours  sertraline 50 milliGRAM(s) Oral daily  vancomycin  IVPB 750 milliGRAM(s) IV Intermittent every 24 hours    MEDICATIONS  (PRN):  albuterol    90 MICROgram(s) HFA Inhaler 2 Puff(s) Inhalation every 6 hours PRN Shortness of Breath and/or Wheezing  dextrose Oral Gel 15 Gram(s) Oral once PRN Blood Glucose LESS THAN 70 milliGRAM(s)/deciliter  guaiFENesin Oral Liquid (Sugar-Free) 100 milliGRAM(s) Oral every 6 hours PRN Cough      ALLERGIES:  Allergies    penicillin (Unknown)    Intolerances        LABS:                        9.7    8.25  )-----------( 213      ( 30 May 2023 05:19 )             31.9     05-30    139  |  108  |  44<H>  ----------------------------<  148<H>  4.9   |  23  |  1.31<H>    Ca    9.7      30 May 2023 05:19  Phos  4.0     05-30  Mg     2.5     05-30    TPro  6.0  /  Alb  2.9<L>  /  TBili  0.3  /  DBili  x   /  AST  33  /  ALT  28  /  AlkPhos  69  05-30        CAPILLARY BLOOD GLUCOSE      POCT Blood Glucose.: 147 mg/dL (30 May 2023 11:29)      RADIOLOGY & ADDITIONAL TESTS: Reviewed. ***Acceptance of transfer from [facility] --> [facility]***    HOSPITAL COURSE: 83 yo F with PMHx HTN, Emphysema (not on home O2), Asthma, memory loss (newly on memantine 10mg and donepezil 5mg) presents to ED c/o SOB X2 days, admitted for COPD exacerbation. Initially improved on RMF, however 5/29, ICU consulted for increased WOB, tachypnea, with improvement on Bipap overnight, stepped up to telemetry for increasing respiratory requirements. Started on Levaquin 500 mg QD for On 5/30, pt weaned from HFNC to 6L NC --> 2L NC. Solumedrol was decreased to 25 BID from TID (D1 of steroids 5/25). Sputum Cx growing mixed viktoriya. Confirmed home verapamil 240 SR qd, restarted for tomorrow and discontinued all other orders. YUNI noted, now improved, likely pre-renal, almost back to baseline. Anemia noted, no signs of bleeding, down almost 3 units from admission, pending iron studies and started on bowel regimen for constipation. Sputum Cx growing moderate staph aureus, pending MRSA swab, started on empiric vancomycin pending final speciation. Weaned to 2L NC, satting mid 90s. Deemed appropriate for stepdown to RMF.     OVERNIGHT EVENTS:  NAEON    SUBJECTIVE / INTERVAL HPI: Patient seen and examined at bedside. On exam, pt continued to report improved breathing without work of breathing. Breathing comfortable on exam, not tachypneic. On questioning. States has not had a bowel movement since admission. She denies headache, fever, chills, nausea, chest pain. ROS otherwise negative     VITAL SIGNS:  Vital Signs Last 24 Hrs  T(C): 37.2 (30 May 2023 13:54), Max: 37.2 (30 May 2023 13:54)  T(F): 99 (30 May 2023 13:54), Max: 99 (30 May 2023 13:54)  HR: 86 (30 May 2023 13:30) (73 - 112)  BP: 121/59 (30 May 2023 13:30) (117/55 - 157/78)  BP(mean): 82 (30 May 2023 13:30) (79 - 109)  RR: 242 (30 May 2023 13:30) (20 - 242)  SpO2: 94% (30 May 2023 13:30) (94% - 100%)    Parameters below as of 30 May 2023 13:30  Patient On (Oxygen Delivery Method): nasal cannula  O2 Flow (L/min): 2      PHYSICAL EXAM:  Constitutional: NAD, resting in bed;  HEENT: NC/AT, anicteric sclera, dry MM  Neck: supple; no JVD or thyromegaly  Respiratory: distant breath sounds, mild crackles at lower lung fields bilaterally, no wheezing, no increased work of breathing, on NC  Cardiac: +S1/S2; regular rate with extra beats, no murmurs  Gastrointestinal: soft, NT/ND; no rebound or guarding; +BS  Extremities: WWP, no clubbing or cyanosis; no peripheral edema  Musculoskeletal: NROM x4;   Vascular: 2+ radial pulses B/L  Dermatologic: skin warm, dry and intact; no rashes, wounds, or scars  Neurologic: AAOx2-3 (baseline dementia, currently mentating at baseline per aide bedside), no focal deficits  Psychiatric: calm, cooperative, behaviors are appropriate    MEDICATIONS:  MEDICATIONS  (STANDING):  albuterol/ipratropium for Nebulization 3 milliLiter(s) Nebulizer every 4 hours  atorvastatin 40 milliGRAM(s) Oral at bedtime  budesonide  80 MICROgram(s)/formoterol 4.5 MICROgram(s) Inhaler 2 Puff(s) Inhalation every 12 hours  dextrose 5%. 1000 milliLiter(s) (50 mL/Hr) IV Continuous <Continuous>  dextrose 5%. 1000 milliLiter(s) (100 mL/Hr) IV Continuous <Continuous>  dextrose 50% Injectable 25 Gram(s) IV Push once  dextrose 50% Injectable 12.5 Gram(s) IV Push once  dextrose 50% Injectable 25 Gram(s) IV Push once  donepezil 5 milliGRAM(s) Oral at bedtime  glucagon  Injectable 1 milliGRAM(s) IntraMuscular once  heparin   Injectable 5000 Unit(s) SubCutaneous every 8 hours  insulin lispro (ADMELOG) corrective regimen sliding scale   SubCutaneous Before meals and at bedtime  levoFLOXacin IVPB      levoFLOXacin IVPB 500 milliGRAM(s) IV Intermittent every 24 hours  methylPREDNISolone sodium succinate Injectable 25 milliGRAM(s) IV Push every 12 hours  sertraline 50 milliGRAM(s) Oral daily  vancomycin  IVPB 750 milliGRAM(s) IV Intermittent every 24 hours    MEDICATIONS  (PRN):  albuterol    90 MICROgram(s) HFA Inhaler 2 Puff(s) Inhalation every 6 hours PRN Shortness of Breath and/or Wheezing  dextrose Oral Gel 15 Gram(s) Oral once PRN Blood Glucose LESS THAN 70 milliGRAM(s)/deciliter  guaiFENesin Oral Liquid (Sugar-Free) 100 milliGRAM(s) Oral every 6 hours PRN Cough      ALLERGIES:  Allergies    penicillin (Unknown)    Intolerances        LABS:                        9.7    8.25  )-----------( 213      ( 30 May 2023 05:19 )             31.9     05-30    139  |  108  |  44<H>  ----------------------------<  148<H>  4.9   |  23  |  1.31<H>    Ca    9.7      30 May 2023 05:19  Phos  4.0     05-30  Mg     2.5     05-30    TPro  6.0  /  Alb  2.9<L>  /  TBili  0.3  /  DBili  x   /  AST  33  /  ALT  28  /  AlkPhos  69  05-30        CAPILLARY BLOOD GLUCOSE      POCT Blood Glucose.: 147 mg/dL (30 May 2023 11:29)      RADIOLOGY & ADDITIONAL TESTS: Reviewed.

## 2023-05-30 NOTE — PROGRESS NOTE ADULT - PROBLEM SELECTOR PLAN 9
Hx of HLD on rosuvastatin 10mg qhs.  - c/w atorvastatin therapeutic interchange 40mg qhs Hx of HTN on verapamil SR 240mg qAM and 120mg qPM  - c/w home meds

## 2023-05-30 NOTE — PROGRESS NOTE ADULT - PROBLEM SELECTOR PLAN 10
F: none  E: replete prn  N: DASH  DVT ppx: Improve score 1    Code status: FULL  Dispo: Tele F: none  E: replete prn  N: DASH  DVT ppx: Improve score 1  Code status: FULL  Dispo: Tele F: none  E: replete prn  N: DASH  DVT ppx: heparin  Code status: FULL  Dispo: Tele -> F

## 2023-05-30 NOTE — PROGRESS NOTE ADULT - PROBLEM SELECTOR PLAN 1
Presents c/o acute sob X1 day.  EMS reported desat to 80s.  No obvious triggers.  CXR clear.  RVP and COVID negative. Initially appeared to have improved after starting prednisone 40mg qd, duonebs, albuterol, symbicort.   However 5/27: Pt satting 87-88% on RA, decreased from previous days. On exam, decreased breath sounds b/l w/ b/l wheezing, no audible crackles. Now also with increased green sputum production. Transferred to telemetry for increased work of breathing requiring BiPAP. Repeat RVP negative. Bedside pocus w/ no consolidations.   - Increase steroids to IVP Methylprednisone 30mg BID (Day 1 of steroids 5/25)  - Transition from BiPAP to HFNC 40/50 and see how patient tolerates  - Sputum culture, f/u results  - Start Levaquin 500mg Qd (Day 1: 5/29) to cover for pneumonia iso new increased work of breathing and increased sputum  - Bipap overnight  - Standing duonebs Q4 and albuterol as needed  - Symbicort bid  - Aerobika device Presents c/o acute sob X1 day.  EMS reported desat to 80s.  No obvious triggers.  CXR clear.  RVP and COVID negative. Initially appeared to have improved after starting prednisone 40mg qd, duonebs, albuterol, symbicort.   However 5/27: Pt satting 87-88% on RA, decreased from previous days. On exam, decreased breath sounds b/l also with increased green sputum production. Transferred to telemetry for increased work of breathing requiring BiPAP. Repeat RVP negative. Bedside pocus w/ no consolidations.   - Decrease steroids to IVP Methylprednisone 25mg BID from 30mg BID (Day 1 of steroids 5/25)  - Patient currently tolerating 2LNC, saturating at 94%, in chair   - Sputum culture w/ Staph aureus, f/u susceptibilities  - f/u MRSA swab  - c/w Levaquin 500mg Qd (Day 1: 5/29) to cover for pneumonia iso new increased work of breathing and increased sputum  - Bipap overnight  - Standing duonebs Q4 and albuterol as needed  - Symbicort bid  - Aerobika device Presents c/o acute sob X1 day.  EMS reported desat to 80s.  No obvious triggers.  CXR clear.  RVP and COVID negative. Initially appeared to have improved after starting prednisone 40mg qd, duonebs, albuterol, symbicort.   However 5/27: Pt satting 87-88% on RA, decreased from previous days. On exam, decreased breath sounds b/l also with increased green sputum production. Transferred to telemetry for increased work of breathing requiring BiPAP. Repeat RVP negative. Bedside pocus w/ no consolidations.   - Decrease steroids to IVP Methylprednisone 25mg BID from 30mg BID (Day 1 of steroids 5/25)  - Patient currently tolerating 2LNC, saturating at 94%, in chair   - Sputum culture w/ Staph aureus, f/u susceptibilities  - f/u MRSA swab  - c/w Levaquin 500mg Qd (course: 5/29-6/4) to cover for pneumonia iso new increased work of breathing and increased sputum  - Bipap overnight  - Standing duonebs Q4 and albuterol as needed  - Symbicort bid  - Aerobika device

## 2023-05-30 NOTE — PROGRESS NOTE ADULT - PROBLEM SELECTOR PLAN 1
Presents c/o acute sob X1 day.  EMS reported desat to 80s.  No obvious triggers.  CXR clear.  RVP and COVID negative. Initially appeared to have improved after starting prednisone 40mg qd, duonebs, albuterol, symbicort.   However 5/27: Pt satting 87-88% on RA, decreased from previous days. On exam, decreased breath sounds b/l also with increased green sputum production. Transferred to telemetry for increased work of breathing requiring BiPAP. -Repeat RVP negative. Bedside pocus w/ no consolidations, etiology favors COPD exacerbation vs less likely asthma exacerbation given poor response to only duonebs and PO steroids   -Sputum cx + Staph aureus, pending final speciation to rule out MRSA     -Pulmonology following   - Decreased steroids to IVP Methylprednisone 25mg BID from 30mg BID (Day 1 of steroids: 5/25)  - Patient currently tolerating 2LNC, saturating at 94%, in chair   - Sputum culture w/ Staph aureus, f/u susceptibilities  - f/u MRSA swab - Staph aureus positive sputum cx, pending speciation. Empiric vancomycin 750 mg QD (dose reduced for age and Cr with pharmacy)   - c/w Levaquin 500mg Qd (Day 1: 5/29) to cover for pneumonia iso new increased work of breathing and increased sputum (allergic to CTX)   - Bipap overnight  - Standing duonebs Q4 and albuterol as needed  - Symbicort bid  - Aerobika device, incentive spirometry

## 2023-05-30 NOTE — PROGRESS NOTE ADULT - PROBLEM SELECTOR PLAN 6
Aid reports that patient not formally diagnosed with dementia and appears to be at her baseline s/p ED initial treatment. On donepezil 5mg and memantine 10 mg, per aid and son.  AAOX3 upon exam but unable to recall events around her sob episode.  - Does not have capacity to make decisions   - c/w donepezil 5mg qd  - hold memantine until kidneys recover  - HCP Kedar Jose: FULL CODE Aid reports that patient not formally diagnosed with dementia and appears to be at her baseline s/p ED initial treatment. On donepezil 5mg and memantine 10 mg BID per pharmacy and son.  AAOX3 upon exam but unable to recall events around her sob episode.  - Does not have capacity to make decisions   - c/w donepezil 5mg qd  - hold memantine until kidneys recover  - HCP Kedar Jose: FULL CODE

## 2023-05-30 NOTE — PROGRESS NOTE ADULT - PROBLEM SELECTOR PLAN 8
Hx of HTN on verapamil 240mg qAM and 120mg qPM  - c/w home meds, if needs consistent bipap will need to start IV conversion Hx of HTN on verapamil SR 240mg qAM and 120mg qPM  - c/w home meds

## 2023-05-30 NOTE — PROGRESS NOTE ADULT - PROBLEM SELECTOR PLAN 4
Patient w/ MAT, likely due to hx of asthma. Home meds: verapamil 240mg qAM and 120mg qPM  - continue to monitor HR  - c/w home meds Patient w/ MAT, likely due to hx of asthma. Home meds: verapamil 240mg qAM and 120mg qPM  - c/w home meds Patient w/ MAT, likely due to hx of asthma. Home meds: verapamil 240mg qAM and 120mg qPM (confirmed medication with Dr. Layton Carroll- PCP)  - c/w home meds

## 2023-05-30 NOTE — PROGRESS NOTE ADULT - PROBLEM SELECTOR PLAN 3
Presented with Cr. 1.44. Baseline is 1.24 with BUN 32, as seen on last labs done outpatient by PCP on May 2022.  BUN/Cr ratio >20. FeUrea 23.8%. FeNa 0.2% - suggests pre-renal. S/p NS 500cc bolus on 5/26 --> Cr 1.55 on 5/27 from 2.05. Today Cr 1.31, almost back to baseline of 1.2.     - encourage PO intake when off Bipap  - trend Cr, consider maintenance fluids if Cr not improving  - follow up with PCP Dr. Layton Carroll after discharge- [phone]

## 2023-05-30 NOTE — PROGRESS NOTE ADULT - SUBJECTIVE AND OBJECTIVE BOX
OVERNIGHT EVENTS:  NAEON    SUBJECTIVE / INTERVAL HPI: Patient seen and examined at bedside. This morning she feels her breathing is better compared to yesterday. She denies headache, fever, chills, nausea, chest pain.     VITAL SIGNS:  Vital Signs Last 24 Hrs  T(C): 37.2 (30 May 2023 13:54), Max: 37.2 (30 May 2023 13:54)  T(F): 99 (30 May 2023 13:54), Max: 99 (30 May 2023 13:54)  HR: 86 (30 May 2023 13:30) (73 - 112)  BP: 121/59 (30 May 2023 13:30) (117/55 - 157/78)  BP(mean): 82 (30 May 2023 13:30) (79 - 109)  RR: 242 (30 May 2023 13:30) (20 - 242)  SpO2: 94% (30 May 2023 13:30) (94% - 100%)    Parameters below as of 30 May 2023 13:30  Patient On (Oxygen Delivery Method): nasal cannula  O2 Flow (L/min): 2      PHYSICAL EXAM:  Constitutional: NAD, resting in bed;  HEENT: NC/AT, anicteric sclera, dry MM  Neck: supple; no JVD or thyromegaly  Respiratory: distant breath sounds, mild crackles at lower lung fields bilaterally, no wheezing, no increased work of breathing, on HFNC;  Cardiac: +S1/S2; regular rate with extra beats, no murmurs  Gastrointestinal: soft, NT/ND; no rebound or guarding; +BS  Extremities: WWP, no clubbing or cyanosis; no peripheral edema  Musculoskeletal: NROM x4;   Vascular: 2+ radial pulses B/L  Dermatologic: skin warm, dry and intact; no rashes, wounds, or scars  Neurologic: AAOx2-3, no focal deficits  Psychiatric: calm, cooperative, behaviors are appropriate    MEDICATIONS:  MEDICATIONS  (STANDING):  albuterol/ipratropium for Nebulization 3 milliLiter(s) Nebulizer every 4 hours  atorvastatin 40 milliGRAM(s) Oral at bedtime  budesonide  80 MICROgram(s)/formoterol 4.5 MICROgram(s) Inhaler 2 Puff(s) Inhalation every 12 hours  dextrose 5%. 1000 milliLiter(s) (50 mL/Hr) IV Continuous <Continuous>  dextrose 5%. 1000 milliLiter(s) (100 mL/Hr) IV Continuous <Continuous>  dextrose 50% Injectable 25 Gram(s) IV Push once  dextrose 50% Injectable 12.5 Gram(s) IV Push once  dextrose 50% Injectable 25 Gram(s) IV Push once  donepezil 5 milliGRAM(s) Oral at bedtime  glucagon  Injectable 1 milliGRAM(s) IntraMuscular once  heparin   Injectable 5000 Unit(s) SubCutaneous every 8 hours  insulin lispro (ADMELOG) corrective regimen sliding scale   SubCutaneous Before meals and at bedtime  levoFLOXacin IVPB      levoFLOXacin IVPB 500 milliGRAM(s) IV Intermittent every 24 hours  methylPREDNISolone sodium succinate Injectable 25 milliGRAM(s) IV Push every 12 hours  sertraline 50 milliGRAM(s) Oral daily  vancomycin  IVPB 750 milliGRAM(s) IV Intermittent every 24 hours    MEDICATIONS  (PRN):  albuterol    90 MICROgram(s) HFA Inhaler 2 Puff(s) Inhalation every 6 hours PRN Shortness of Breath and/or Wheezing  dextrose Oral Gel 15 Gram(s) Oral once PRN Blood Glucose LESS THAN 70 milliGRAM(s)/deciliter  guaiFENesin Oral Liquid (Sugar-Free) 100 milliGRAM(s) Oral every 6 hours PRN Cough      ALLERGIES:  Allergies    penicillin (Unknown)    Intolerances        LABS:                        9.7    8.25  )-----------( 213      ( 30 May 2023 05:19 )             31.9     05-30    139  |  108  |  44<H>  ----------------------------<  148<H>  4.9   |  23  |  1.31<H>    Ca    9.7      30 May 2023 05:19  Phos  4.0     05-30  Mg     2.5     05-30    TPro  6.0  /  Alb  2.9<L>  /  TBili  0.3  /  DBili  x   /  AST  33  /  ALT  28  /  AlkPhos  69  05-30        CAPILLARY BLOOD GLUCOSE      POCT Blood Glucose.: 147 mg/dL (30 May 2023 11:29)      RADIOLOGY & ADDITIONAL TESTS: Reviewed. ***********TRANSFER FROM TELE TO [facility]**********************    Hospital course:  81yo F PMH HTN, emphysema (no home O2), asthma, memory loss (newly on memantine and donepezil) presented w/ SOB of unclear etiology x2d, course c/b YUNI and iWOB requiring BiPAP and HFNC for increased sputum production, stepped up to tele 5/29 for AHRF and new abx. Now YUNI improving and weaned to 6L O2.    83 yo F with PMHx HTN, HLD, Emphysema (not on home O2), Asthma, memory loss (newly on memantine and donepezil), and depression who presents to ED c/o SOB of unclear etiology x2d, admitted for acute SOB 2/2 asthma exacerbation. Improved initially on nebulizer treatments with plan to discharge next day but noted to have worsening YUNI. Given fluids with response in Cr. Developed worsening SOB and cough (now with yellow-green sputum) with increased WOB and tachypnea on 5/28. ICU consulted for acute respiratory failure 2/2 COPD exacerbation. Patient refused transfer at the time, decision was made to closely monitor with Bipap overnight. The next morning, patient amenable to transfer but has short-term memory loss so keeps forgetting conversations and refusing again. It was deemed that patient does not have capacity. Called son Kedar Jose who is HCP, who states he agrees with decision to have his mother transferred to [facility] and she remains FULL CODE. She was started on HFNC on telemetry and broadened to Levaquin (patient has pencillin allergy- did not trial CTX). RVP repeated, sputum sent and currently growing moderate staph aureus. MRSA swab sent. Patient weaned off HFNC to 2LNC, satting mid 90s. Decreased solumedrol to 25mg IV BID (from 30 BID). Pending f/u MRSA swab and sputum culture suscpetibilities. Stable for transfer to [facility].     OVERNIGHT EVENTS:  NAEON    SUBJECTIVE / INTERVAL HPI: Patient seen and examined at bedside. This morning she feels her breathing is better compared to yesterday. She denies headache, fever, chills, nausea, chest pain.     VITAL SIGNS:  Vital Signs Last 24 Hrs  T(C): 37.2 (30 May 2023 13:54), Max: 37.2 (30 May 2023 13:54)  T(F): 99 (30 May 2023 13:54), Max: 99 (30 May 2023 13:54)  HR: 86 (30 May 2023 13:30) (73 - 112)  BP: 121/59 (30 May 2023 13:30) (117/55 - 157/78)  BP(mean): 82 (30 May 2023 13:30) (79 - 109)  RR: 242 (30 May 2023 13:30) (20 - 242)  SpO2: 94% (30 May 2023 13:30) (94% - 100%)    Parameters below as of 30 May 2023 13:30  Patient On (Oxygen Delivery Method): nasal cannula  O2 Flow (L/min): 2      PHYSICAL EXAM:  Constitutional: NAD, resting in bed;  HEENT: NC/AT, anicteric sclera, dry MM  Neck: supple; no JVD or thyromegaly  Respiratory: distant breath sounds, mild crackles at lower lung fields bilaterally, no wheezing, no increased work of breathing, on HFNC;  Cardiac: +S1/S2; regular rate with extra beats, no murmurs  Gastrointestinal: soft, NT/ND; no rebound or guarding; +BS  Extremities: WWP, no clubbing or cyanosis; no peripheral edema  Musculoskeletal: NROM x4;   Vascular: 2+ radial pulses B/L  Dermatologic: skin warm, dry and intact; no rashes, wounds, or scars  Neurologic: AAOx2-3, no focal deficits  Psychiatric: calm, cooperative, behaviors are appropriate    MEDICATIONS:  MEDICATIONS  (STANDING):  albuterol/ipratropium for Nebulization 3 milliLiter(s) Nebulizer every 4 hours  atorvastatin 40 milliGRAM(s) Oral at bedtime  budesonide  80 MICROgram(s)/formoterol 4.5 MICROgram(s) Inhaler 2 Puff(s) Inhalation every 12 hours  dextrose 5%. 1000 milliLiter(s) (50 mL/Hr) IV Continuous <Continuous>  dextrose 5%. 1000 milliLiter(s) (100 mL/Hr) IV Continuous <Continuous>  dextrose 50% Injectable 25 Gram(s) IV Push once  dextrose 50% Injectable 12.5 Gram(s) IV Push once  dextrose 50% Injectable 25 Gram(s) IV Push once  donepezil 5 milliGRAM(s) Oral at bedtime  glucagon  Injectable 1 milliGRAM(s) IntraMuscular once  heparin   Injectable 5000 Unit(s) SubCutaneous every 8 hours  insulin lispro (ADMELOG) corrective regimen sliding scale   SubCutaneous Before meals and at bedtime  levoFLOXacin IVPB      levoFLOXacin IVPB 500 milliGRAM(s) IV Intermittent every 24 hours  methylPREDNISolone sodium succinate Injectable 25 milliGRAM(s) IV Push every 12 hours  sertraline 50 milliGRAM(s) Oral daily  vancomycin  IVPB 750 milliGRAM(s) IV Intermittent every 24 hours    MEDICATIONS  (PRN):  albuterol    90 MICROgram(s) HFA Inhaler 2 Puff(s) Inhalation every 6 hours PRN Shortness of Breath and/or Wheezing  dextrose Oral Gel 15 Gram(s) Oral once PRN Blood Glucose LESS THAN 70 milliGRAM(s)/deciliter  guaiFENesin Oral Liquid (Sugar-Free) 100 milliGRAM(s) Oral every 6 hours PRN Cough      ALLERGIES:  Allergies    penicillin (Unknown)    Intolerances        LABS:                        9.7    8.25  )-----------( 213      ( 30 May 2023 05:19 )             31.9     05-30    139  |  108  |  44<H>  ----------------------------<  148<H>  4.9   |  23  |  1.31<H>    Ca    9.7      30 May 2023 05:19  Phos  4.0     05-30  Mg     2.5     05-30    TPro  6.0  /  Alb  2.9<L>  /  TBili  0.3  /  DBili  x   /  AST  33  /  ALT  28  /  AlkPhos  69  05-30        CAPILLARY BLOOD GLUCOSE      POCT Blood Glucose.: 147 mg/dL (30 May 2023 11:29)      RADIOLOGY & ADDITIONAL TESTS: Reviewed. ***********TRANSFER FROM TELE TO [facility]**********************    Hospital course:  81 yo F with PMHx HTN, HLD, Emphysema (not on home O2), Asthma, memory loss (newly on memantine and donepezil), and depression who presents to ED c/o SOB of unclear etiology x2d, admitted for acute SOB 2/2 asthma exacerbation. Improved initially on nebulizer treatments with plan to discharge next day but noted to have worsening YUNI. Given fluids with response in Cr. Developed worsening SOB and cough (now with yellow-green sputum) with increased WOB and tachypnea on 5/28. ICU consulted for acute respiratory failure 2/2 COPD exacerbation. Patient refused transfer at the time, decision was made to closely monitor with Bipap overnight. The next morning, patient amenable to transfer but has short-term memory loss so keeps forgetting conversations and refusing again. It was deemed that patient does not have capacity. Called son Kedar Jose who is HCP, who states he agrees with decision to have his mother transferred to [facility] and she remains FULL CODE. She was started on HFNC on telemetry and broadened to Levaquin (patient has pencillin allergy- did not trial CTX). RVP repeated, sputum sent and currently growing moderate staph aureus. MRSA swab sent. Patient weaned off HFNC to 2LNC, satting mid 90s. Decreased solumedrol to 25mg IV BID (from 30 BID). Pending f/u MRSA swab and sputum culture suscpetibilities. Stable for transfer to [facility].     OVERNIGHT EVENTS:  NAEON    SUBJECTIVE / INTERVAL HPI: Patient seen and examined at bedside. This morning she feels her breathing is better compared to yesterday. She denies headache, fever, chills, nausea, chest pain.     VITAL SIGNS:  Vital Signs Last 24 Hrs  T(C): 37.2 (30 May 2023 13:54), Max: 37.2 (30 May 2023 13:54)  T(F): 99 (30 May 2023 13:54), Max: 99 (30 May 2023 13:54)  HR: 86 (30 May 2023 13:30) (73 - 112)  BP: 121/59 (30 May 2023 13:30) (117/55 - 157/78)  BP(mean): 82 (30 May 2023 13:30) (79 - 109)  RR: 242 (30 May 2023 13:30) (20 - 242)  SpO2: 94% (30 May 2023 13:30) (94% - 100%)    Parameters below as of 30 May 2023 13:30  Patient On (Oxygen Delivery Method): nasal cannula  O2 Flow (L/min): 2      PHYSICAL EXAM:  Constitutional: NAD, resting in bed;  HEENT: NC/AT, anicteric sclera, dry MM  Neck: supple; no JVD or thyromegaly  Respiratory: distant breath sounds, mild crackles at lower lung fields bilaterally, no wheezing, no increased work of breathing, on HFNC;  Cardiac: +S1/S2; regular rate with extra beats, no murmurs  Gastrointestinal: soft, NT/ND; no rebound or guarding; +BS  Extremities: WWP, no clubbing or cyanosis; no peripheral edema  Musculoskeletal: NROM x4;   Vascular: 2+ radial pulses B/L  Dermatologic: skin warm, dry and intact; no rashes, wounds, or scars  Neurologic: AAOx2-3, no focal deficits  Psychiatric: calm, cooperative, behaviors are appropriate    MEDICATIONS:  MEDICATIONS  (STANDING):  albuterol/ipratropium for Nebulization 3 milliLiter(s) Nebulizer every 4 hours  atorvastatin 40 milliGRAM(s) Oral at bedtime  budesonide  80 MICROgram(s)/formoterol 4.5 MICROgram(s) Inhaler 2 Puff(s) Inhalation every 12 hours  dextrose 5%. 1000 milliLiter(s) (50 mL/Hr) IV Continuous <Continuous>  dextrose 5%. 1000 milliLiter(s) (100 mL/Hr) IV Continuous <Continuous>  dextrose 50% Injectable 25 Gram(s) IV Push once  dextrose 50% Injectable 12.5 Gram(s) IV Push once  dextrose 50% Injectable 25 Gram(s) IV Push once  donepezil 5 milliGRAM(s) Oral at bedtime  glucagon  Injectable 1 milliGRAM(s) IntraMuscular once  heparin   Injectable 5000 Unit(s) SubCutaneous every 8 hours  insulin lispro (ADMELOG) corrective regimen sliding scale   SubCutaneous Before meals and at bedtime  levoFLOXacin IVPB      levoFLOXacin IVPB 500 milliGRAM(s) IV Intermittent every 24 hours  methylPREDNISolone sodium succinate Injectable 25 milliGRAM(s) IV Push every 12 hours  sertraline 50 milliGRAM(s) Oral daily  vancomycin  IVPB 750 milliGRAM(s) IV Intermittent every 24 hours    MEDICATIONS  (PRN):  albuterol    90 MICROgram(s) HFA Inhaler 2 Puff(s) Inhalation every 6 hours PRN Shortness of Breath and/or Wheezing  dextrose Oral Gel 15 Gram(s) Oral once PRN Blood Glucose LESS THAN 70 milliGRAM(s)/deciliter  guaiFENesin Oral Liquid (Sugar-Free) 100 milliGRAM(s) Oral every 6 hours PRN Cough      ALLERGIES:  Allergies    penicillin (Unknown)    Intolerances        LABS:                        9.7    8.25  )-----------( 213      ( 30 May 2023 05:19 )             31.9     05-30    139  |  108  |  44<H>  ----------------------------<  148<H>  4.9   |  23  |  1.31<H>    Ca    9.7      30 May 2023 05:19  Phos  4.0     05-30  Mg     2.5     05-30    TPro  6.0  /  Alb  2.9<L>  /  TBili  0.3  /  DBili  x   /  AST  33  /  ALT  28  /  AlkPhos  69  05-30        CAPILLARY BLOOD GLUCOSE      POCT Blood Glucose.: 147 mg/dL (30 May 2023 11:29)      RADIOLOGY & ADDITIONAL TESTS: Reviewed.

## 2023-05-30 NOTE — PROGRESS NOTE ADULT - PROBLEM SELECTOR PLAN 6
Aid reports that patient not formally diagnosed with dementia and appears to be at her baseline s/p ED initial treatment. On donepezil 5mg and memantine 10 mg BID per pharmacy and son.  AAOX3 upon exam but unable to recall events around her sob episode.  - Does not have capacity to make decisions   - c/w donepezil 5mg qd  - Holding memantine d/t YUNI - can likely resume tomorrow if Cr back to baseline, improving   - HCP Kedar Jose: FULL CODE Hx of emphysema on inhalers as above- Trelegy and albuterol prn.

## 2023-05-30 NOTE — PROGRESS NOTE ADULT - ASSESSMENT
81 yo F with PMHx HTN, Emphysema (not on home O2), Asthma, memory loss (newly on memantine 10mg and donepezil 5mg) presents to ED c/o SOB X2 days, presumed asthma exacerbation. Initially improved, but now with increased WOB and tachypnea, with some improvement on Bipap overnight, now being stepped up to tele. 81 yo F with PMHx HTN, Emphysema (not on home O2), Asthma, memory loss (newly on memantine 10mg and donepezil 5mg) presents to ED c/o SOB X2 days, presumed asthma exacerbation. Initially improved, but now with increased WOB and tachypnea, with some improvement on Bipap stepped up to tele for further monitoring. Patient weaned off HFNC to 2LNC, stable for stepdown to [facility].  83yo F PMH HTN, emphysema (no home O2), asthma, memory loss (newly on memantine and donepezil) presented w/ SOB of unclear etiology x2d, course c/b YUNI and iWOB requiring BiPAP and HFNC for increased sputum production, stepped up to tele 5/29 for AHRF and new abx. Now YUNI improving and weaned to 2L O2.

## 2023-05-30 NOTE — PROGRESS NOTE ADULT - PROBLEM SELECTOR PLAN 7
Hx of depression on sertraline 50mg qd.  - c/w home med Hx of depression on sertraline 50mg qd.  - c/w home med- sertraline 50mg qd

## 2023-05-30 NOTE — PROGRESS NOTE ADULT - PROBLEM SELECTOR PLAN 4
Patient w/ MAT, likely due to hx of asthma. Home meds: verapamil 240mg qAM and 120mg qPM  - c/w home meds Slow down trend over past few days of Hgb from 13.6 (5/25) -->  9.7 (5/30), normocytic. No history of anemia, no signs symptoms of bleeding, denies melena, easy bruising/bleeding/hemoptysis. Given such progressively downtrend combined with the absence of bleeding, etiology favors dilutional s/p fluids and phlebotomy   -Active T&S  -Trend CBC daily  -F/u iron panel

## 2023-05-30 NOTE — PROGRESS NOTE ADULT - PROBLEM SELECTOR PLAN 10
F: none  E: replete prn  N: DASH  DVT ppx: Improve score 1, Heparin   Code status: FULL  Dispo: [facility] Hx of HLD on rosuvastatin 10mg qhs.  - c/w atorvastatin therapeutic interchange 40mg qhs

## 2023-05-31 LAB
-  CLINDAMYCIN: SIGNIFICANT CHANGE UP
-  ERYTHROMYCIN: SIGNIFICANT CHANGE UP
-  LINEZOLID: SIGNIFICANT CHANGE UP
-  OXACILLIN: SIGNIFICANT CHANGE UP
-  RIFAMPIN: SIGNIFICANT CHANGE UP
-  TRIMETHOPRIM/SULFAMETHOXAZOLE: SIGNIFICANT CHANGE UP
-  VANCOMYCIN: SIGNIFICANT CHANGE UP
ANION GAP SERPL CALC-SCNC: 8 MMOL/L — SIGNIFICANT CHANGE UP (ref 5–17)
APTT BLD: 25.8 SEC — LOW (ref 27.5–35.5)
BASOPHILS # BLD AUTO: 0.01 K/UL — SIGNIFICANT CHANGE UP (ref 0–0.2)
BASOPHILS NFR BLD AUTO: 0.2 % — SIGNIFICANT CHANGE UP (ref 0–2)
BLD GP AB SCN SERPL QL: NEGATIVE — SIGNIFICANT CHANGE UP
BUN SERPL-MCNC: 50 MG/DL — HIGH (ref 7–23)
CALCIUM SERPL-MCNC: 9.9 MG/DL — SIGNIFICANT CHANGE UP (ref 8.4–10.5)
CHLORIDE SERPL-SCNC: 107 MMOL/L — SIGNIFICANT CHANGE UP (ref 96–108)
CO2 SERPL-SCNC: 24 MMOL/L — SIGNIFICANT CHANGE UP (ref 22–31)
CREAT SERPL-MCNC: 1.32 MG/DL — HIGH (ref 0.5–1.3)
EGFR: 41 ML/MIN/1.73M2 — LOW
EOSINOPHIL # BLD AUTO: 0 K/UL — SIGNIFICANT CHANGE UP (ref 0–0.5)
EOSINOPHIL NFR BLD AUTO: 0 % — SIGNIFICANT CHANGE UP (ref 0–6)
FERRITIN SERPL-MCNC: 237 NG/ML — HIGH (ref 15–150)
GLUCOSE BLDC GLUCOMTR-MCNC: 130 MG/DL — HIGH (ref 70–99)
GLUCOSE BLDC GLUCOMTR-MCNC: 147 MG/DL — HIGH (ref 70–99)
GLUCOSE BLDC GLUCOMTR-MCNC: 152 MG/DL — HIGH (ref 70–99)
GLUCOSE BLDC GLUCOMTR-MCNC: 258 MG/DL — HIGH (ref 70–99)
GLUCOSE SERPL-MCNC: 164 MG/DL — HIGH (ref 70–99)
HCT VFR BLD CALC: 30.2 % — LOW (ref 34.5–45)
HGB BLD-MCNC: 9.4 G/DL — LOW (ref 11.5–15.5)
IMM GRANULOCYTES NFR BLD AUTO: 0.5 % — SIGNIFICANT CHANGE UP (ref 0–0.9)
INR BLD: 0.98 — SIGNIFICANT CHANGE UP (ref 0.88–1.16)
IRON SATN MFR SERPL: 25 % — SIGNIFICANT CHANGE UP (ref 14–50)
IRON SATN MFR SERPL: 53 UG/DL — SIGNIFICANT CHANGE UP (ref 30–160)
LYMPHOCYTES # BLD AUTO: 0.25 K/UL — LOW (ref 1–3.3)
LYMPHOCYTES # BLD AUTO: 3.8 % — LOW (ref 13–44)
MAGNESIUM SERPL-MCNC: 2.3 MG/DL — SIGNIFICANT CHANGE UP (ref 1.6–2.6)
MCHC RBC-ENTMCNC: 28.6 PG — SIGNIFICANT CHANGE UP (ref 27–34)
MCHC RBC-ENTMCNC: 31.1 GM/DL — LOW (ref 32–36)
MCV RBC AUTO: 91.8 FL — SIGNIFICANT CHANGE UP (ref 80–100)
METHOD TYPE: SIGNIFICANT CHANGE UP
MONOCYTES # BLD AUTO: 0.19 K/UL — SIGNIFICANT CHANGE UP (ref 0–0.9)
MONOCYTES NFR BLD AUTO: 2.9 % — SIGNIFICANT CHANGE UP (ref 2–14)
NEUTROPHILS # BLD AUTO: 6.03 K/UL — SIGNIFICANT CHANGE UP (ref 1.8–7.4)
NEUTROPHILS NFR BLD AUTO: 92.6 % — HIGH (ref 43–77)
NRBC # BLD: 0 /100 WBCS — SIGNIFICANT CHANGE UP (ref 0–0)
PLATELET # BLD AUTO: 182 K/UL — SIGNIFICANT CHANGE UP (ref 150–400)
POTASSIUM SERPL-MCNC: 4.9 MMOL/L — SIGNIFICANT CHANGE UP (ref 3.5–5.3)
POTASSIUM SERPL-SCNC: 4.9 MMOL/L — SIGNIFICANT CHANGE UP (ref 3.5–5.3)
PROTHROM AB SERPL-ACNC: 11.6 SEC — SIGNIFICANT CHANGE UP (ref 10.5–13.4)
RBC # BLD: 3.29 M/UL — LOW (ref 3.8–5.2)
RBC # FLD: 15.7 % — HIGH (ref 10.3–14.5)
RH IG SCN BLD-IMP: POSITIVE — SIGNIFICANT CHANGE UP
SODIUM SERPL-SCNC: 139 MMOL/L — SIGNIFICANT CHANGE UP (ref 135–145)
TIBC SERPL-MCNC: 214 UG/DL — LOW (ref 220–430)
UIBC SERPL-MCNC: 161 UG/DL — SIGNIFICANT CHANGE UP (ref 110–370)
WBC # BLD: 6.51 K/UL — SIGNIFICANT CHANGE UP (ref 3.8–10.5)
WBC # FLD AUTO: 6.51 K/UL — SIGNIFICANT CHANGE UP (ref 3.8–10.5)

## 2023-05-31 PROCEDURE — 99232 SBSQ HOSP IP/OBS MODERATE 35: CPT | Mod: GC

## 2023-05-31 RX ORDER — LEVOFLOXACIN 5 MG/ML
500 INJECTION, SOLUTION INTRAVENOUS EVERY 24 HOURS
Refills: 0 | Status: DISCONTINUED | OUTPATIENT
Start: 2023-06-01 | End: 2023-06-01

## 2023-05-31 RX ADMIN — Medication 25 MILLIGRAM(S): at 05:59

## 2023-05-31 RX ADMIN — BUDESONIDE AND FORMOTEROL FUMARATE DIHYDRATE 2 PUFF(S): 160; 4.5 AEROSOL RESPIRATORY (INHALATION) at 09:10

## 2023-05-31 RX ADMIN — Medication 1: at 09:07

## 2023-05-31 RX ADMIN — SERTRALINE 50 MILLIGRAM(S): 25 TABLET, FILM COATED ORAL at 11:27

## 2023-05-31 RX ADMIN — Medication 3 MILLILITER(S): at 22:16

## 2023-05-31 RX ADMIN — POLYETHYLENE GLYCOL 3350 17 GRAM(S): 17 POWDER, FOR SOLUTION ORAL at 11:27

## 2023-05-31 RX ADMIN — Medication 120 MILLIGRAM(S): at 18:05

## 2023-05-31 RX ADMIN — Medication 240 MILLIGRAM(S): at 07:02

## 2023-05-31 RX ADMIN — SENNA PLUS 1 TABLET(S): 8.6 TABLET ORAL at 22:16

## 2023-05-31 RX ADMIN — Medication 3: at 12:57

## 2023-05-31 RX ADMIN — DONEPEZIL HYDROCHLORIDE 5 MILLIGRAM(S): 10 TABLET, FILM COATED ORAL at 22:16

## 2023-05-31 RX ADMIN — HEPARIN SODIUM 5000 UNIT(S): 5000 INJECTION INTRAVENOUS; SUBCUTANEOUS at 22:16

## 2023-05-31 RX ADMIN — Medication 3 MILLILITER(S): at 12:30

## 2023-05-31 RX ADMIN — ATORVASTATIN CALCIUM 40 MILLIGRAM(S): 80 TABLET, FILM COATED ORAL at 22:16

## 2023-05-31 RX ADMIN — Medication 25 MILLIGRAM(S): at 16:47

## 2023-05-31 RX ADMIN — HEPARIN SODIUM 5000 UNIT(S): 5000 INJECTION INTRAVENOUS; SUBCUTANEOUS at 05:59

## 2023-05-31 RX ADMIN — Medication 3 MILLILITER(S): at 09:07

## 2023-05-31 RX ADMIN — Medication 3 MILLILITER(S): at 16:46

## 2023-05-31 RX ADMIN — HEPARIN SODIUM 5000 UNIT(S): 5000 INJECTION INTRAVENOUS; SUBCUTANEOUS at 12:30

## 2023-05-31 RX ADMIN — BUDESONIDE AND FORMOTEROL FUMARATE DIHYDRATE 2 PUFF(S): 160; 4.5 AEROSOL RESPIRATORY (INHALATION) at 22:17

## 2023-05-31 NOTE — PROGRESS NOTE ADULT - SUBJECTIVE AND OBJECTIVE BOX
PULMONARY CONSULT SERVICE FOLLOW-UP NOTE    INTERVAL HPI:  Reviewed chart and overnight events; patient seen and examined at bedside. SOB and wheezing/cough improved    MEDICATIONS:  Pulmonary:  albuterol    90 MICROgram(s) HFA Inhaler 2 Puff(s) Inhalation every 6 hours PRN  albuterol/ipratropium for Nebulization 3 milliLiter(s) Nebulizer every 4 hours  budesonide  80 MICROgram(s)/formoterol 4.5 MICROgram(s) Inhaler 2 Puff(s) Inhalation every 12 hours  guaiFENesin Oral Liquid (Sugar-Free) 100 milliGRAM(s) Oral every 6 hours PRN    Antimicrobials:    Anticoagulants:  heparin   Injectable 5000 Unit(s) SubCutaneous every 8 hours    Cardiac:  verapamil  milliGRAM(s) Oral every 24 hours  verapamil  milliGRAM(s) Oral every 24 hours      Allergies    penicillin (Unknown)    Intolerances        Vital Signs Last 24 Hrs  T(C): 36.6 (31 May 2023 12:45), Max: 36.9 (30 May 2023 19:20)  T(F): 97.8 (31 May 2023 12:45), Max: 98.4 (30 May 2023 19:20)  HR: 82 (31 May 2023 12:45) (79 - 88)  BP: 148/84 (31 May 2023 12:45) (139/80 - 158/94)  BP(mean): --  RR: 18 (31 May 2023 12:45) (18 - 20)  SpO2: 94% (31 May 2023 12:45) (94% - 96%)    Parameters below as of 31 May 2023 12:45  Patient On (Oxygen Delivery Method): room air        05-30 @ 07:01  -  05-31 @ 07:00  --------------------------------------------------------  IN: 830 mL / OUT: 300 mL / NET: 530 mL          PHYSICAL EXAM:  Constitutional: NAD.  HEENT: MMM  Neck: -JVD   Cardiovascular: +S1/S2, RRR  Respiratory: Minimal air movement, +wheezing  Gastrointestinal: NT/ND  Extremities: WWP; no edema,  Vascular: 2+ radial pulses B/L  Neurological: awake and alert; HOLLIS    LABS:      CBC Full  -  ( 31 May 2023 05:30 )  WBC Count : 6.51 K/uL  RBC Count : 3.29 M/uL  Hemoglobin : 9.4 g/dL  Hematocrit : 30.2 %  Platelet Count - Automated : 182 K/uL  Mean Cell Volume : 91.8 fl  Mean Cell Hemoglobin : 28.6 pg  Mean Cell Hemoglobin Concentration : 31.1 gm/dL  Auto Neutrophil # : 6.03 K/uL  Auto Lymphocyte # : 0.25 K/uL  Auto Monocyte # : 0.19 K/uL  Auto Eosinophil # : 0.00 K/uL  Auto Basophil # : 0.01 K/uL  Auto Neutrophil % : 92.6 %  Auto Lymphocyte % : 3.8 %  Auto Monocyte % : 2.9 %  Auto Eosinophil % : 0.0 %  Auto Basophil % : 0.2 %    05-31    139  |  107  |  50<H>  ----------------------------<  164<H>  4.9   |  24  |  1.32<H>    Ca    9.9      31 May 2023 05:30  Phos  4.0     05-30  Mg     2.3     05-31    TPro  6.0  /  Alb  2.9<L>  /  TBili  0.3  /  DBili  x   /  AST  33  /  ALT  28  /  AlkPhos  69  05-30    PT/INR - ( 31 May 2023 05:30 )   PT: 11.6 sec;   INR: 0.98          PTT - ( 31 May 2023 05:30 )  PTT:25.8 sec                  RADIOLOGY & ADDITIONAL STUDIES:

## 2023-05-31 NOTE — PROGRESS NOTE ADULT - ASSESSMENT
82 year old admitted for increased work of breathing at PT and pulmonary called for continued cough. Patient being treated for COPD exacerbation with visible hyperinflation of the lungs on CXR    Outpatient pulm: Dr. Johnson  Meds: Breo, montelukast  PFTS: FEV1/FVC 49, FEV1 50%, FVC 77%,  %, %, DLCO 63%    COPD exacerbation  Hyperinflation  Cough    Patient chest imaging shows clear hyperinflation of the lungs with some small visible dilated airways. Patient likely has poor clearance of secretions in  the setting of bronchiectasis from her chronic obstructive airways disease. Agree with asteroids and would give duonebs standing as well as airway clearance with aerobika. She has strong cough so does not need hypertonic saline but has very poor movement of air likely from impacted airways with mucus which aerobika will mobilize. Preliminary sputum culture with GPC, GNR, GPR. moderate WBCs with speciation showing staph. aureus now on NC.  - c/w steroids, can transition to PO prednisone 40mg anticipate slow taper given still wheezing on exam  - c/w duonebs q4hr  - Should be on NIV overnight or PRN to help with auto-peep when present  - c/w levofloxacin, although would double check sensitivities  - Maintain spo2 > 88%

## 2023-05-31 NOTE — PROGRESS NOTE ADULT - PROBLEM SELECTOR PLAN 1
Presents c/o acute sob X1 day.  EMS reported desat to 80s.  No obvious triggers.  CXR clear.  RVP and COVID negative. Initially appeared to have improved after starting prednisone 40mg qd, duonebs, albuterol, symbicort.   However 5/27: Pt satting 87-88% on RA, decreased from previous days. On exam, decreased breath sounds b/l also with increased green sputum production. Transferred to telemetry for increased work of breathing requiring BiPAP. -Repeat RVP negative. Bedside pocus w/ no consolidations, etiology favors COPD exacerbation vs less likely asthma exacerbation given poor response to only duonebs and PO steroids   -Sputum cx + Staph aureus, pending final speciation to rule out MRSA     -Pulmonology following, appreciate recs  - c/w  Methylprednisone 25mg BID IV - possible transition to PO and further taper pending Pulm recs (Day 1 of steroids: 5/25)  - Patient currently tolerating 2LNC, saturating at 94%, in chair   - Sputum culture w/ Staph aureus methicllin sensitive (MRSA swab neg, s/p empiric vancomycin 750mg 5/30)  - c/w Levaquin 500mg Qd (transitioned to PO today) (Day 1: 5/29) to cover for pneumonia iso new increased work of breathing and increased sputum (allergic to CTX) - end date June 4  - Bipap overnight - ensure compliance despite patient comfortable on NC  - Standing duonebs Q4 and albuterol as needed  - Symbicort bid  - Aerobika device, incentive spirometry

## 2023-05-31 NOTE — PROGRESS NOTE ADULT - SUBJECTIVE AND OBJECTIVE BOX
OVERNIGHT EVENTS: NITIN    SUBJECTIVE / INTERVAL HPI: Patient seen and examined at bedside. Slept well overnight but did not want to wear the BiPAP as she felt comfortable with the nasal cannula. Would like her wet sheets exchanged. ROS otherwise negative.     VITAL SIGNS:  Vital Signs Last 24 Hrs  T(C): 36.6 (31 May 2023 12:45), Max: 36.9 (30 May 2023 19:20)  T(F): 97.8 (31 May 2023 12:45), Max: 98.4 (30 May 2023 19:20)  HR: 82 (31 May 2023 12:45) (79 - 88)  BP: 148/84 (31 May 2023 12:45) (137/61 - 158/94)  BP(mean): 88 (30 May 2023 16:10) (88 - 88)  RR: 18 (31 May 2023 12:45) (18 - 20)  SpO2: 94% (31 May 2023 12:45) (94% - 96%)    Parameters below as of 31 May 2023 12:45  Patient On (Oxygen Delivery Method): room air      I&O's Summary    30 May 2023 07:01  -  31 May 2023 07:00  --------------------------------------------------------  IN: 830 mL / OUT: 300 mL / NET: 530 mL        PHYSICAL EXAM:    Constitutional: NAD, resting in bed  HEENT: NC/AT, anicteric sclera, dry MM  Neck: supple; no JVD or thyromegaly  Respiratory: distant breath sounds, mild crackles at lower lung fields bilaterally, no wheezing, no increased work of breathing, on NC  Cardiac: +S1/S2; regular rate with extra beats, no murmurs  Gastrointestinal: soft, NT/ND; no rebound or guarding; +BS  Extremities: WWP, no clubbing or cyanosis; no peripheral edema  Musculoskeletal: NROM x4;   Vascular: 2+ radial pulses B/L  Dermatologic: skin warm, dry and intact; no rashes, wounds, or scars  Neurologic: AAOx3 w/ intermittent confusion (baseline dementia, no focal deficits  Psychiatric: calm, cooperative, behaviors are appropriate    MEDICATIONS:  MEDICATIONS  (STANDING):  albuterol/ipratropium for Nebulization 3 milliLiter(s) Nebulizer every 4 hours  atorvastatin 40 milliGRAM(s) Oral at bedtime  budesonide  80 MICROgram(s)/formoterol 4.5 MICROgram(s) Inhaler 2 Puff(s) Inhalation every 12 hours  dextrose 5%. 1000 milliLiter(s) (100 mL/Hr) IV Continuous <Continuous>  dextrose 5%. 1000 milliLiter(s) (50 mL/Hr) IV Continuous <Continuous>  dextrose 50% Injectable 25 Gram(s) IV Push once  dextrose 50% Injectable 12.5 Gram(s) IV Push once  dextrose 50% Injectable 25 Gram(s) IV Push once  donepezil 5 milliGRAM(s) Oral at bedtime  glucagon  Injectable 1 milliGRAM(s) IntraMuscular once  heparin   Injectable 5000 Unit(s) SubCutaneous every 8 hours  insulin lispro (ADMELOG) corrective regimen sliding scale   SubCutaneous Before meals and at bedtime  methylPREDNISolone sodium succinate Injectable 25 milliGRAM(s) IV Push every 12 hours  polyethylene glycol 3350 17 Gram(s) Oral daily  senna 1 Tablet(s) Oral at bedtime  sertraline 50 milliGRAM(s) Oral daily  verapamil  milliGRAM(s) Oral every 24 hours  verapamil  milliGRAM(s) Oral every 24 hours    MEDICATIONS  (PRN):  albuterol    90 MICROgram(s) HFA Inhaler 2 Puff(s) Inhalation every 6 hours PRN Shortness of Breath and/or Wheezing  dextrose Oral Gel 15 Gram(s) Oral once PRN Blood Glucose LESS THAN 70 milliGRAM(s)/deciliter  guaiFENesin Oral Liquid (Sugar-Free) 100 milliGRAM(s) Oral every 6 hours PRN Cough      ALLERGIES:  Allergies    penicillin (Unknown)    Intolerances        LABS:                        9.4    6.51  )-----------( 182      ( 31 May 2023 05:30 )             30.2     05-31    139  |  107  |  50<H>  ----------------------------<  164<H>  4.9   |  24  |  1.32<H>    Ca    9.9      31 May 2023 05:30  Phos  4.0     05-30  Mg     2.3     05-31    TPro  6.0  /  Alb  2.9<L>  /  TBili  0.3  /  DBili  x   /  AST  33  /  ALT  28  /  AlkPhos  69  05-30    PT/INR - ( 31 May 2023 05:30 )   PT: 11.6 sec;   INR: 0.98          PTT - ( 31 May 2023 05:30 )  PTT:25.8 sec    CAPILLARY BLOOD GLUCOSE      POCT Blood Glucose.: 258 mg/dL (31 May 2023 12:53)      RADIOLOGY & ADDITIONAL TESTS: Reviewed.

## 2023-05-31 NOTE — PROGRESS NOTE ADULT - PROBLEM SELECTOR PLAN 7
Aid reports that patient not formally diagnosed with dementia and appears to be at her baseline s/p ED initial treatment. On donepezil 5mg and memantine 10 mg BID per pharmacy and son.  AAOX3 upon exam but unable to recall events around her sob episode.  - Does not have capacity to make decisions   - c/w donepezil 5mg qd  - Holding memantine d/t YUNI - can likely resume tomorrow if Cr back to baseline, improving   - HCP Kedar Jose: FULL CODE

## 2023-05-31 NOTE — PROGRESS NOTE ADULT - ATTENDING COMMENTS
Patient was seen and examined at bedside on 5/29/2023 at 930 am. Patient does not feel well, reports worsened chest tightness/wheeze. Denies CP. abdominal pain, dizziness. ROS is otherwise negative. Vitals, labwork and pertinent imaging reviewed - tachypneic through the night. Physical exam - mild respiratory distress, alert, PERRLA, chest - poor air movement, wheeze throughout, CV - rrr, s1s2, no m/r/g, abd - soft, NTND, + BS, ext - wwp, psych - normal affect, MSK - no joint swelling, skin - no rash    Plan  -C/w Prednisone  -ICU reconsult - pt unable to make decisions as she is in extremis - if decision is to move to tele or ICU
plan as above
plan as above  in addition NIV PRN during the day and then during the night for work of breathing as she is very hyperinflated
Patient was seen and examined at bedside on 5/27/2023 at 1030 am. Patient overall does not feel well, reports continued chest tightness/wheeze. Denies CP. abdominal pain, dizziness. ROS is otherwise negative. Vitals, labwork and pertinent imaging reviewed. Physical exam - NAD, AAO x 4, PERRLA, chest - wheeze throughout b/l, CV - rrr, s1s2, no m/r/g, abd - soft, NTND, + BS, ext - wwp, psych - normal affect, MSK - no joint swelling, skin - no rash    Plan  -C/w Prednisone  -Leukocytosis likely 2/2 steroids  -Pulm consult as possibly mixed picture - COPD/Asthma overlap?  -Check Peak flow
Patient was seen and examined at bedside on 5/29/2023 at 930 am. Patient does not feel well, reports worsened chest tightness/wheeze. Denies CP. abdominal pain, dizziness. ROS is otherwise negative. Vitals, labwork and pertinent imaging reviewed - tachypneic through the night. Physical exam - mild respiratory distress, alert, PERRLA, chest - poor air movement, wheeze throughout, CV - rrr, s1s2, no m/r/g, abd - soft, NTND, + BS, ext - wwp, psych - normal affect, MSK - no joint swelling, skin - no rash    Plan  -C/w Prednisone  -ICU reconsult - pt unable to make decisions as she is in extremis - if decision is to move to tele or ICU
overall doing well on room air  continue nebulizers as above  Cr initially noted to be 1.45 later 1.79; give 500ccbolus and repeat BMP. If Cr downtrends, can be discharged later tonight.  obtain urine lytes and urine creatinine
Pt on 2 L NC. OOB. comfortable. MRSA swab pending but pt clinically improved prior to Vanco. Continue steroid taper .
#AHRF, COPD exacerbation  - continue steroid taper  #YUNI, improved      rest of plan as above

## 2023-05-31 NOTE — PROGRESS NOTE ADULT - PROBLEM SELECTOR PLAN 3
Presented with Cr. 1.44. Baseline is 1.24 with BUN 32, as seen on last labs done outpatient by PCP on May 2022.  BUN/Cr ratio >20. FeUrea 23.8%. FeNa 0.2% - suggests pre-renal. S/p NS 500cc bolus on 5/26 --> Cr 1.55 on 5/27 from 2.05. Today Cr 1.31, almost back to baseline of 1.2.     - encourage PO intake when off Bipap  - follow up with PCP Dr. Layton Carroll after discharge- [phone]

## 2023-05-31 NOTE — PROGRESS NOTE ADULT - PROBLEM SELECTOR PLAN 4
Slow down trend over past few days of Hgb from 13.6 (5/25) -->  9.7 (5/30), normocytic. No history of anemia, no signs symptoms of bleeding, denies melena, easy bruising/bleeding/hemoptysis. Given such progressively downtrend combined with the absence of bleeding, etiology favors dilutional s/p fluids and phlebotomy  - iron panel c/w mixed acute phase/AoCD  -Active T&S  -Trend CBC daily

## 2023-05-31 NOTE — PROGRESS NOTE ADULT - PROBLEM SELECTOR PLAN 5
Patient w/ MAT, likely due to hx of asthma. Home meds: verapamil 240mg qAM and 120mg qPM  - c/w home meds

## 2023-05-31 NOTE — PROGRESS NOTE ADULT - PROBLEM SELECTOR PLAN 2
Hx of asthma on Trelegy and albuterol prn.  - treatment as above  - follows with Pulmonologist, Dr. Paula Aguilar ([phone]; direct line [phone])

## 2023-06-01 LAB
-  TETRACYCLINE: SIGNIFICANT CHANGE UP
ANION GAP SERPL CALC-SCNC: 11 MMOL/L — SIGNIFICANT CHANGE UP (ref 5–17)
BUN SERPL-MCNC: 53 MG/DL — HIGH (ref 7–23)
CALCIUM SERPL-MCNC: 10.1 MG/DL — SIGNIFICANT CHANGE UP (ref 8.4–10.5)
CHLORIDE SERPL-SCNC: 104 MMOL/L — SIGNIFICANT CHANGE UP (ref 96–108)
CO2 SERPL-SCNC: 23 MMOL/L — SIGNIFICANT CHANGE UP (ref 22–31)
CREAT SERPL-MCNC: 1.35 MG/DL — HIGH (ref 0.5–1.3)
CULTURE RESULTS: SIGNIFICANT CHANGE UP
EGFR: 39 ML/MIN/1.73M2 — LOW
GLUCOSE BLDC GLUCOMTR-MCNC: 127 MG/DL — HIGH (ref 70–99)
GLUCOSE BLDC GLUCOMTR-MCNC: 131 MG/DL — HIGH (ref 70–99)
GLUCOSE BLDC GLUCOMTR-MCNC: 236 MG/DL — HIGH (ref 70–99)
GLUCOSE BLDC GLUCOMTR-MCNC: 320 MG/DL — HIGH (ref 70–99)
GLUCOSE SERPL-MCNC: 170 MG/DL — HIGH (ref 70–99)
HCT VFR BLD CALC: 30.7 % — LOW (ref 34.5–45)
HGB BLD-MCNC: 9.6 G/DL — LOW (ref 11.5–15.5)
MAGNESIUM SERPL-MCNC: 2.4 MG/DL — SIGNIFICANT CHANGE UP (ref 1.6–2.6)
MCHC RBC-ENTMCNC: 28.7 PG — SIGNIFICANT CHANGE UP (ref 27–34)
MCHC RBC-ENTMCNC: 31.3 GM/DL — LOW (ref 32–36)
MCV RBC AUTO: 91.6 FL — SIGNIFICANT CHANGE UP (ref 80–100)
NRBC # BLD: 0 /100 WBCS — SIGNIFICANT CHANGE UP (ref 0–0)
ORGANISM # SPEC MICROSCOPIC CNT: SIGNIFICANT CHANGE UP
ORGANISM # SPEC MICROSCOPIC CNT: SIGNIFICANT CHANGE UP
PHOSPHATE SERPL-MCNC: 3.6 MG/DL — SIGNIFICANT CHANGE UP (ref 2.5–4.5)
PLATELET # BLD AUTO: 203 K/UL — SIGNIFICANT CHANGE UP (ref 150–400)
POTASSIUM SERPL-MCNC: 5 MMOL/L — SIGNIFICANT CHANGE UP (ref 3.5–5.3)
POTASSIUM SERPL-SCNC: 5 MMOL/L — SIGNIFICANT CHANGE UP (ref 3.5–5.3)
RBC # BLD: 3.35 M/UL — LOW (ref 3.8–5.2)
RBC # FLD: 16 % — HIGH (ref 10.3–14.5)
SODIUM SERPL-SCNC: 138 MMOL/L — SIGNIFICANT CHANGE UP (ref 135–145)
SPECIMEN SOURCE: SIGNIFICANT CHANGE UP
WBC # BLD: 7.74 K/UL — SIGNIFICANT CHANGE UP (ref 3.8–10.5)
WBC # FLD AUTO: 7.74 K/UL — SIGNIFICANT CHANGE UP (ref 3.8–10.5)

## 2023-06-01 PROCEDURE — 99233 SBSQ HOSP IP/OBS HIGH 50: CPT | Mod: GC

## 2023-06-01 PROCEDURE — 99232 SBSQ HOSP IP/OBS MODERATE 35: CPT | Mod: GC

## 2023-06-01 RX ADMIN — Medication 3 MILLILITER(S): at 23:07

## 2023-06-01 RX ADMIN — Medication 100 MILLIGRAM(S): at 12:17

## 2023-06-01 RX ADMIN — HEPARIN SODIUM 5000 UNIT(S): 5000 INJECTION INTRAVENOUS; SUBCUTANEOUS at 23:08

## 2023-06-01 RX ADMIN — BUDESONIDE AND FORMOTEROL FUMARATE DIHYDRATE 2 PUFF(S): 160; 4.5 AEROSOL RESPIRATORY (INHALATION) at 09:21

## 2023-06-01 RX ADMIN — HEPARIN SODIUM 5000 UNIT(S): 5000 INJECTION INTRAVENOUS; SUBCUTANEOUS at 06:32

## 2023-06-01 RX ADMIN — SERTRALINE 50 MILLIGRAM(S): 25 TABLET, FILM COATED ORAL at 11:42

## 2023-06-01 RX ADMIN — Medication 120 MILLIGRAM(S): at 18:30

## 2023-06-01 RX ADMIN — Medication 4: at 13:17

## 2023-06-01 RX ADMIN — Medication 2: at 18:30

## 2023-06-01 RX ADMIN — Medication 40 MILLIGRAM(S): at 06:32

## 2023-06-01 RX ADMIN — BUDESONIDE AND FORMOTEROL FUMARATE DIHYDRATE 2 PUFF(S): 160; 4.5 AEROSOL RESPIRATORY (INHALATION) at 23:08

## 2023-06-01 RX ADMIN — Medication 3 MILLILITER(S): at 14:12

## 2023-06-01 RX ADMIN — Medication 100 MILLIGRAM(S): at 23:25

## 2023-06-01 RX ADMIN — Medication 3 MILLILITER(S): at 06:32

## 2023-06-01 RX ADMIN — Medication 3 MILLILITER(S): at 19:01

## 2023-06-01 RX ADMIN — HEPARIN SODIUM 5000 UNIT(S): 5000 INJECTION INTRAVENOUS; SUBCUTANEOUS at 14:12

## 2023-06-01 RX ADMIN — Medication 100 MILLIGRAM(S): at 06:32

## 2023-06-01 RX ADMIN — POLYETHYLENE GLYCOL 3350 17 GRAM(S): 17 POWDER, FOR SOLUTION ORAL at 11:43

## 2023-06-01 RX ADMIN — DONEPEZIL HYDROCHLORIDE 5 MILLIGRAM(S): 10 TABLET, FILM COATED ORAL at 23:06

## 2023-06-01 RX ADMIN — ATORVASTATIN CALCIUM 40 MILLIGRAM(S): 80 TABLET, FILM COATED ORAL at 23:07

## 2023-06-01 RX ADMIN — Medication 3 MILLILITER(S): at 09:21

## 2023-06-01 RX ADMIN — Medication 240 MILLIGRAM(S): at 07:09

## 2023-06-01 NOTE — PROGRESS NOTE ADULT - SUBJECTIVE AND OBJECTIVE BOX
PULMONARY CONSULT SERVICE FOLLOW-UP NOTE    INTERVAL HPI:  Reviewed chart and overnight events; patient seen and examined at bedside. Feels better compared to when she first came in. Did not tolerate NIV overngith. No acute complaints.     MEDICATIONS:  Pulmonary:  albuterol    90 MICROgram(s) HFA Inhaler 2 Puff(s) Inhalation every 6 hours PRN  albuterol/ipratropium for Nebulization 3 milliLiter(s) Nebulizer every 4 hours  budesonide  80 MICROgram(s)/formoterol 4.5 MICROgram(s) Inhaler 2 Puff(s) Inhalation every 12 hours  guaiFENesin Oral Liquid (Sugar-Free) 100 milliGRAM(s) Oral every 6 hours PRN    Antimicrobials:  doxycycline monohydrate Capsule 100 milliGRAM(s) Oral every 12 hours    Anticoagulants:  heparin   Injectable 5000 Unit(s) SubCutaneous every 8 hours    Cardiac:  verapamil  milliGRAM(s) Oral every 24 hours  verapamil  milliGRAM(s) Oral every 24 hours      Allergies    penicillin (Unknown)    Intolerances        Vital Signs Last 24 Hrs  T(C): 36.6 (01 Jun 2023 05:25), Max: 36.6 (31 May 2023 12:45)  T(F): 97.8 (01 Jun 2023 05:25), Max: 97.8 (31 May 2023 12:45)  HR: 85 (01 Jun 2023 06:45) (78 - 98)  BP: 140/85 (01 Jun 2023 06:45) (133/69 - 148/84)  BP(mean): --  RR: 18 (01 Jun 2023 06:02) (18 - 24)  SpO2: 92% (01 Jun 2023 06:02) (92% - 98%)    Parameters below as of 01 Jun 2023 06:02  Patient On (Oxygen Delivery Method): room air            PHYSICAL EXAM:  Constitutional: chronically ill appearing   HEENT: NC/AT; PERRL, anicteric sclera; MMM  Neck: supple  Cardiovascular: +S1/S2, RRR  Respiratory: pursed lip breathing, end expiratory wheezes   Gastrointestinal: soft, NT/ND  Extremities: WWP; no edema, clubbing or cyanosis  Vascular: 2+ radial pulses B/L  Neurological: awake and alert; HOLLIS    LABS:      CBC Full  -  ( 01 Jun 2023 05:30 )  WBC Count : 7.74 K/uL  RBC Count : 3.35 M/uL  Hemoglobin : 9.6 g/dL  Hematocrit : 30.7 %  Platelet Count - Automated : 203 K/uL  Mean Cell Volume : 91.6 fl  Mean Cell Hemoglobin : 28.7 pg  Mean Cell Hemoglobin Concentration : 31.3 gm/dL  Auto Neutrophil # : x  Auto Lymphocyte # : x  Auto Monocyte # : x  Auto Eosinophil # : x  Auto Basophil # : x  Auto Neutrophil % : x  Auto Lymphocyte % : x  Auto Monocyte % : x  Auto Eosinophil % : x  Auto Basophil % : x    06-01    138  |  104  |  53<H>  ----------------------------<  170<H>  5.0   |  23  |  1.35<H>    Ca    10.1      01 Jun 2023 05:30  Phos  3.6     06-01  Mg     2.4     06-01      PT/INR - ( 31 May 2023 05:30 )   PT: 11.6 sec;   INR: 0.98          PTT - ( 31 May 2023 05:30 )  PTT:25.8 sec                  RADIOLOGY & ADDITIONAL STUDIES:

## 2023-06-01 NOTE — PROGRESS NOTE ADULT - PROBLEM SELECTOR PLAN 11
F: none  E: replete prn  N: DASH  DVT ppx: Improve score 1, Heparin   Code status: FULL  Dispo: [facility]
F: none  E: replete prn  N: DASH  DVT ppx: Improve score 1, Heparin   Code status: FULL  Dispo: [facility]
F: none  E: replete prn  N: DASH  DVT ppx: Improve score 1, Heparin   Code status: FULL  Dispo: [facility]

## 2023-06-01 NOTE — PROGRESS NOTE ADULT - PROBLEM SELECTOR PLAN 3
Presented with Cr. 1.44. Baseline is 1.24 with BUN 32, as seen on last labs done outpatient by PCP on May 2022.  BUN/Cr ratio >20. FeUrea 23.8%. FeNa 0.2% - suggests pre-renal. S/p NS 500cc bolus on 5/26 --> Cr 1.55 on 5/27 from 2.05. Today Cr 1.31, almost back to baseline of 1.2.     - encourage PO intake when off Bipap  - follow up with PCP Dr. Layton Carroll after discharge- [phone]

## 2023-06-01 NOTE — PROGRESS NOTE ADULT - SUBJECTIVE AND OBJECTIVE BOX
OVERNIGHT EVENTS: NITIN    SUBJECTIVE / INTERVAL HPI: Patient seen and examined at bedside. Wore BiPap for few hours starting around 8PM until midnight, as discussed previous evening. Amenable to same plan tonight. Some wheezing while laying down, ROS otherwise negative.     VITAL SIGNS:  Vital Signs Last 24 Hrs  T(C): 36.6 (01 Jun 2023 05:25), Max: 36.6 (31 May 2023 20:35)  T(F): 97.8 (01 Jun 2023 05:25), Max: 97.8 (31 May 2023 20:35)  HR: 82 (01 Jun 2023 11:29) (78 - 98)  BP: 140/85 (01 Jun 2023 06:45) (133/69 - 146/78)  BP(mean): --  RR: 15 (01 Jun 2023 11:29) (15 - 24)  SpO2: 94% (01 Jun 2023 11:29) (92% - 98%)    Parameters below as of 01 Jun 2023 11:29  Patient On (Oxygen Delivery Method): room air      I&O's Summary      PHYSICAL EXAM:    Constitutional: NAD, resting in bed  HEENT: NC/AT, anicteric sclera, dry MM  Neck: supple; no JVD or thyromegaly  Respiratory: mild expiratory wheezing, no increased work of breathing, on RA  Cardiac: +S1/S2; regular rate with extra beats, no murmurs  Gastrointestinal: soft, NT/ND; no rebound or guarding; +BS  Extremities: WWP, no clubbing or cyanosis; no peripheral edema  Musculoskeletal: NROM x4;   Vascular: 2+ radial pulses B/L  Dermatologic: skin warm, dry and intact; no rashes, wounds, or scars  Neurologic: AAOx3 w/ intermittent confusion (baseline dementia) no focal deficits  Psychiatric: calm, cooperative, behaviors are appropriate    MEDICATIONS:  MEDICATIONS  (STANDING):  albuterol/ipratropium for Nebulization 3 milliLiter(s) Nebulizer every 4 hours  atorvastatin 40 milliGRAM(s) Oral at bedtime  budesonide  80 MICROgram(s)/formoterol 4.5 MICROgram(s) Inhaler 2 Puff(s) Inhalation every 12 hours  dextrose 5%. 1000 milliLiter(s) (100 mL/Hr) IV Continuous <Continuous>  dextrose 5%. 1000 milliLiter(s) (50 mL/Hr) IV Continuous <Continuous>  dextrose 50% Injectable 25 Gram(s) IV Push once  dextrose 50% Injectable 12.5 Gram(s) IV Push once  dextrose 50% Injectable 25 Gram(s) IV Push once  donepezil 5 milliGRAM(s) Oral at bedtime  doxycycline monohydrate Capsule 100 milliGRAM(s) Oral every 12 hours  glucagon  Injectable 1 milliGRAM(s) IntraMuscular once  heparin   Injectable 5000 Unit(s) SubCutaneous every 8 hours  insulin lispro (ADMELOG) corrective regimen sliding scale   SubCutaneous Before meals and at bedtime  polyethylene glycol 3350 17 Gram(s) Oral daily  predniSONE   Tablet 40 milliGRAM(s) Oral every 24 hours  senna 1 Tablet(s) Oral at bedtime  sertraline 50 milliGRAM(s) Oral daily  verapamil  milliGRAM(s) Oral every 24 hours  verapamil  milliGRAM(s) Oral every 24 hours    MEDICATIONS  (PRN):  albuterol    90 MICROgram(s) HFA Inhaler 2 Puff(s) Inhalation every 6 hours PRN Shortness of Breath and/or Wheezing  dextrose Oral Gel 15 Gram(s) Oral once PRN Blood Glucose LESS THAN 70 milliGRAM(s)/deciliter  guaiFENesin Oral Liquid (Sugar-Free) 100 milliGRAM(s) Oral every 6 hours PRN Cough      ALLERGIES:  Allergies    penicillin (Unknown)    Intolerances        LABS:                        9.6    7.74  )-----------( 203      ( 01 Jun 2023 05:30 )             30.7     06-01    138  |  104  |  53<H>  ----------------------------<  170<H>  5.0   |  23  |  1.35<H>    Ca    10.1      01 Jun 2023 05:30  Phos  3.6     06-01  Mg     2.4     06-01      PT/INR - ( 31 May 2023 05:30 )   PT: 11.6 sec;   INR: 0.98          PTT - ( 31 May 2023 05:30 )  PTT:25.8 sec    CAPILLARY BLOOD GLUCOSE      POCT Blood Glucose.: 127 mg/dL (01 Jun 2023 09:25)      RADIOLOGY & ADDITIONAL TESTS: Reviewed.

## 2023-06-01 NOTE — PROGRESS NOTE ADULT - PROBLEM SELECTOR PROBLEM 3
YUNI (acute kidney injury)

## 2023-06-01 NOTE — PROGRESS NOTE ADULT - ASSESSMENT
83 yo F with PMHx HTN, Emphysema (not on home O2), Asthma, memory loss (newly on memantine 10mg and donepezil 5mg) presents to ED c/o SOB X2 days, presumed asthma exacerbation, ICU consulted for iWOB requiring temporary BIPAP and escalation to telemetry care 5/29, started on treatment for COPD exacerbation with improvement and weaning of O2 requirements, deemed appropriate for stepdown to medical floors

## 2023-06-01 NOTE — PROGRESS NOTE ADULT - PROBLEM SELECTOR PLAN 1
Presents c/o acute sob X1 day.  EMS reported desat to 80s.  No obvious triggers.  CXR clear.  RVP and COVID negative. Initially appeared to have improved after starting prednisone 40mg qd, duonebs, albuterol, symbicort.   However 5/27: Pt satting 87-88% on RA, decreased from previous days. On exam, decreased breath sounds b/l also with increased green sputum production. Transferred to telemetry for increased work of breathing requiring BiPAP. -Repeat RVP negative. Bedside pocus w/ no consolidations, etiology favors COPD exacerbation vs less likely asthma exacerbation given poor response to only duonebs and PO steroids   -Sputum cx + Staph aureus, pending final speciation to rule out MRSA     -Pulmonology following, appreciate recs  - c/w PO Prednisone 40mg - further taper pending Pulm recs (Day 1 of steroids: 5/25)  - Patient currently on RA   - Started on Levoquin 5/29 to cover for pneumonia iso of new increased work of breathing on telemetry  - Sputum culture w/ Staph aureus methicllin sensitive (MRSA swab neg, s/p empiric vancomycin 750mg 5/30)  - Levaquin changed to Doxycycline 100mg BID following sensitivities for new 7 day course (Day 1: 6/1) - end date June 7  - Bipap overnight - ensure compliance despite patient comfortable on NC  - Standing duonebs Q4 and albuterol as needed  - Symbicort bid  - Aerobika device, incentive spirometry

## 2023-06-01 NOTE — PROGRESS NOTE ADULT - SUBJECTIVE AND OBJECTIVE BOX
Physical Medicine and Rehabilitation Progress Note :       Patient is a 82y old  Female who presents with a chief complaint of sob (01 Jun 2023 12:54)      HPI:  81 yo F with PMHx HTN, HLD, Emphysema (not on home O2), Asthma, memory loss (newly on memantine and donepzeil), depressionpresents to ED c/o SOB since yesterday but she does not remember details about what happened.  Her aid at bedside (Loren) states that this began yesterday with PT.  Loren was not there but was told that when PT was working with her, her HR and BP both increased and she was short of breath.  Today when was with the patient, she was trying to get the patient to go for a walk but noticed that was more tired and had iwob and a worsening cough (no change in sputum production noted).  She gave her a nebulizer with minimal relief and since she "didn't like how she looked", she called the ambulence.      Per ED report, EMS reported circumoral cyanosis and tachypnea to 30s and satting mid-80s on RA with temp of 100.  EMS had given her X1 duoneb.    Of note, patient is AAOX3 at time of interview but unable to recall any details surrounding the event.  Neither she nor the aid can report any triggers, including allergens or signs/symptoms of illness.  Aid reports that patient not formally diagnosed with dementia and appears to be at her baseline s/p ED initial treatment.          In the ED:  Initial vital signs: T: 100 F, HR: 95, BP: 150/83, R: 20, SpO2: 96% on RA  ED course:   Labs: significant for Cr 1.44  Imaging:  CXR: no infiltrates  EKG: sinus tach with PACs  Medications: solumedrol 125mg X1, duoneb X1, Mg 2g  Consults: none  (25 May 2023 20:18)                            9.6    7.74  )-----------( 203      ( 01 Jun 2023 05:30 )             30.7       06-01    138  |  104  |  53<H>  ----------------------------<  170<H>  5.0   |  23  |  1.35<H>    Ca    10.1      01 Jun 2023 05:30  Phos  3.6     06-01  Mg     2.4     06-01      Vital Signs Last 24 Hrs  T(C): 36.6 (01 Jun 2023 05:25), Max: 36.6 (31 May 2023 20:35)  T(F): 97.8 (01 Jun 2023 05:25), Max: 97.8 (31 May 2023 20:35)  HR: 82 (01 Jun 2023 11:29) (78 - 98)  BP: 140/85 (01 Jun 2023 06:45) (133/69 - 146/78)  BP(mean): --  RR: 15 (01 Jun 2023 11:29) (15 - 24)  SpO2: 94% (01 Jun 2023 11:29) (92% - 98%)    Parameters below as of 01 Jun 2023 11:29  Patient On (Oxygen Delivery Method): room air        MEDICATIONS  (STANDING):  albuterol/ipratropium for Nebulization 3 milliLiter(s) Nebulizer every 4 hours  atorvastatin 40 milliGRAM(s) Oral at bedtime  budesonide  80 MICROgram(s)/formoterol 4.5 MICROgram(s) Inhaler 2 Puff(s) Inhalation every 12 hours  dextrose 5%. 1000 milliLiter(s) (100 mL/Hr) IV Continuous <Continuous>  dextrose 5%. 1000 milliLiter(s) (50 mL/Hr) IV Continuous <Continuous>  dextrose 50% Injectable 25 Gram(s) IV Push once  dextrose 50% Injectable 12.5 Gram(s) IV Push once  dextrose 50% Injectable 25 Gram(s) IV Push once  donepezil 5 milliGRAM(s) Oral at bedtime  doxycycline monohydrate Capsule 100 milliGRAM(s) Oral every 12 hours  glucagon  Injectable 1 milliGRAM(s) IntraMuscular once  heparin   Injectable 5000 Unit(s) SubCutaneous every 8 hours  insulin lispro (ADMELOG) corrective regimen sliding scale   SubCutaneous Before meals and at bedtime  polyethylene glycol 3350 17 Gram(s) Oral daily  predniSONE   Tablet 40 milliGRAM(s) Oral every 24 hours  senna 1 Tablet(s) Oral at bedtime  sertraline 50 milliGRAM(s) Oral daily  verapamil  milliGRAM(s) Oral every 24 hours  verapamil  milliGRAM(s) Oral every 24 hours    MEDICATIONS  (PRN):  albuterol    90 MICROgram(s) HFA Inhaler 2 Puff(s) Inhalation every 6 hours PRN Shortness of Breath and/or Wheezing  dextrose Oral Gel 15 Gram(s) Oral once PRN Blood Glucose LESS THAN 70 milliGRAM(s)/deciliter  guaiFENesin Oral Liquid (Sugar-Free) 100 milliGRAM(s) Oral every 6 hours PRN Cough       Functional Status Assessment :   5/31/2023    Pain Assessment/Number Scale (0-10) Adult  Presence of Pain: denies pain/discomfort (Rating = 0)  Pain Rating (0-10): Rest: 0 (no pain/absence of nonverbal indicators of pain)  Pain Rating (0-10): Activity: 0 (no pain/absence of nonverbal indicators of pain)    Safety      AM-PAC Functional Assessment: Basic Mobility  Type of Assessment: Daily assessment  Turning from your back to your side while in a flat bed without using bedrails?: 3 = A little assistance  Moving from lying on your back to sitting on the flat side of a flat bed without using bedrails?: 3 = A little assistance  Moving to and from a bed to a chair (including a wheelchair)?: 3 = A little assistance  Standing up from a chair using your arms (e.g. wheelchair or bedside chair)?: 3 = A little assistance  Walking in hospital room?: 3 = A little assistance  Climbing 3-5 steps with a railing?: 3 = A little assistance  Score: 18   Row Comment: Ask the patient "How much help from another person do you currently need? (If the patient hasn't done an activity recently, how much help from another person do you think he/she needs if he/she tried?)    Cognitive/Neuro      Cognitive/Neuro/Behavioral  Level of Consciousness: forgetful     Language Assistance  Preferred Language to Address Healthcare Preferred Language to Address Healthcare: English    Therapeutic Interventions      Sit-Stand Transfer Training  Transfer Training Sit-to-Stand Transfer: contact guard;  minimum assist (75% patient effort);  1 person assist;  nonverbal cues (demo/gestures);  verbal cues;  rolling walker  Transfer Training Stand-to-Sit Transfer: minimum assist (75% patient effort);  1 person assist;  nonverbal cues (demo/gestures);  verbal cues;  rolling walker  Sit-to-Stand Transfer Training Transfer Safety Analysis: decreased balance;  decreased cognition;  decreased sequencing ability;  decreased step length;  decreased weight-shifting ability;  decreased strength;  impaired balance;  impaired postural control;  rolling walker    Gait Training  Gait Training: contact guard;  1 person assist;  nonverbal cues (demo/gestures);  verbal cues;  rolling walker;  75 feet  Gait Analysis: 3-point gait   decreased angel;  crouch;  increased time in double stance;  decreased hip/knee flexion;  shuffling;  decreased step length;  decreased trunk rotation;  decreased weight-shifting ability;  decreased strength;  impaired balance;  impaired postural control;  75 feet;  rolling walker    Therapeutic Exercise  Therapeutic Exercise Detail: pursed lip breathing         PM&R Impression : as above    Current Disposition Plan Recommendations :     d/c home , home physical  therapy , home assist

## 2023-06-01 NOTE — PROGRESS NOTE ADULT - ASSESSMENT
IM    82 y o F with PMHx HTN, Emphysema (not on home O2), Asthma, memory loss (newly on memantine 10mg and donepezil 5mg) presents to ED c/o SOB X2 days, presumed asthma exacerbation, ICU consulted for iWOB requiring temporary BIPAP and escalation to telemetry care 5/29, started on treatment for COPD exacerbation with improvement and weaning of O2 requirements, deemed appropriate for stepdown to medical floors    Problem/Plan - 1:  ·  Problem: Acute respiratory failure with hypoxia.   ·  Plan: Presents c/o acute sob X1 day.  EMS reported desat to 80s.  No obvious triggers.  CXR clear.  RVP and COVID negative. Initially appeared to have improved after starting prednisone 40mg qd, duonebs, albuterol, symbicort.   However 5/27: Pt satting 87-88% on RA, decreased from previous days. On exam, decreased breath sounds b/l also with increased green sputum production. Transferred to telemetry for increased work of breathing requiring BiPAP. -Repeat RVP negative. Bedside pocus w/ no consolidations, etiology favors COPD exacerbation vs less likely asthma exacerbation given poor response to only duonebs and PO steroids   -Sputum cx + Staph aureus, pending final speciation to rule out MRSA     -Pulmonology following, appreciate recs  - c/w PO Prednisone 40mg - further taper pending Pulm recs (Day 1 of steroids: 5/25)  - Patient currently on RA   - Started on Levoquin 5/29 to cover for pneumonia iso of new increased work of breathing on telemetry  - Sputum culture w/ Staph aureus methicllin sensitive (MRSA swab neg, s/p empiric vancomycin 750mg 5/30)  - Levaquin changed to Doxycycline 100mg BID following sensitivities for new 7 day course (Day 1: 6/1) - end date June 7  - Bipap overnight - ensure compliance despite patient comfortable on NC  - Standing duonebs Q4 and albuterol as needed  - Symbicort bid  - Aerobika device, incentive spirometry.    Problem/Plan - 2:  ·  Problem: Acute asthma exacerbation.   ·  Plan: Hx of asthma on Trelegy and albuterol prn.  - treatment as above  - follows with Pulmonologist, Dr. Paula Aguilar ([phone]; direct line [phone]).    Problem/Plan - 3:  ·  Problem: YUNI (acute kidney injury).   ·  Plan: Presented with Cr. 1.44. Baseline is 1.24 with BUN 32, as seen on last labs done outpatient by PCP on May 2022.  BUN/Cr ratio >20. FeUrea 23.8%. FeNa 0.2% - suggests pre-renal. S/p NS 500cc bolus on 5/26 --> Cr 1.55 on 5/27 from 2.05. Today Cr 1.31, almost back to baseline of 1.2.     - encourage PO intake when off Bipap  - follow up with PCP Dr. Layton Carroll after discharge- [phone].    Problem/Plan - 4:  ·  Problem: Anemia.   ·  Plan: Slow down trend over past few days of Hgb from 13.6 (5/25) -->  9.7 (5/30), normocytic. No history of anemia, no signs symptoms of bleeding, denies melena, easy bruising/bleeding/hemoptysis. Given such progressively downtrend combined with the absence of bleeding, etiology favors dilutional s/p fluids and phlebotomy  - iron panel c/w mixed acute phase/AoCD  -Active T&S  -Trend CBC daily.    Problem/Plan - 5:  ·  Problem: Multifocal atrial tachycardia.   ·  Plan: Patient w/ MAT, likely due to hx of asthma. Home meds: verapamil 240mg qAM and 120mg qPM  - c/w home meds.    Problem/Plan - 6:  ·  Problem: Emphysema/COPD.   ·  Plan: Hx of emphysema on inhalers as above- Trelegy and albuterol prn.    Problem/Plan - 7:  ·  Problem: Memory loss.   ·  Plan: Aid reports that patient not formally diagnosed with dementia and appears to be at her baseline s/p ED initial treatment. On donepezil 5mg and memantine 10 mg BID per pharmacy and son.  AAOX3 upon exam but unable to recall events around her sob episode.  - Does not have capacity to make decisions   - c/w donepezil 5mg qd  - Holding memantine d/t YUNI - can likely resume tomorrow if Cr back to baseline, improving   - HCP Kedar Jose: FULL CODE.    Problem/Plan - 8:  ·  Problem: Depression, major.   ·  Plan: Hx of depression on sertraline 50mg qd.  - c/w home med.    Problem/Plan - 9:  ·  Problem: HTN (hypertension).   ·  Plan: Hx of HTN on verapamil SR 240mg qAM and 120mg qPM  - c/w home meds.    Problem/Plan - 10:  ·  Problem: HLD (hyperlipidemia).   ·  Plan; Hx of HLD on rosuvastatin 10mg qhs.  - c/w atorvastatin therapeutic interchange 40mg qhs.    Problem/Plan - 11:  ·  Problem: Nutrition, metabolism, and development symptoms.   ·  Plan: F: none  E: replete prn  N: DASH  DVT ppx: Improve score 1, Heparin   Code status: FULL  Dispo: [facility]F.

## 2023-06-01 NOTE — PROGRESS NOTE ADULT - PROBLEM SELECTOR PLAN 2
Hx of asthma on Trelegy and albuterol prn.  - treatment as above  - follows with Pulmonologist, Dr. Paula Aguilar ([phone]; direct line [phone])

## 2023-06-01 NOTE — PROGRESS NOTE ADULT - ASSESSMENT
82 year old admitted for increased work of breathing at PT and pulmonary called for continued cough. Patient being treated for COPD exacerbation with visible hyperinflation of the lungs on CXR    Outpatient pulm: Dr. Johnson  Meds: Breo, montelukast  PFTS: FEV1/FVC 49, FEV1 50%, FVC 77%,  %, %, DLCO 63%    COPD exacerbation  Hyperinflation  Cough    Patient chest imaging shows clear hyperinflation of the lungs with some small visible dilated airways. Patient likely has poor clearance of secretions in  the setting of bronchiectasis from her chronic obstructive airways disease. Agree with asteroids and would give duonebs standing as well as airway clearance with aerobika. She has strong cough so does not need hypertonic saline but has very poor movement of air likely from impacted airways with mucus which aerobika will mobilize. Preliminary sputum culture with GPC, GNR, GPR. moderate WBCs with speciation showing staph. aureus now on NC.  - c/w steroids, can transition to PO prednisone 40mg anticipate slow taper given still wheezing on exam  - c/w duonebs q4hr  - Should be on NIV overnight or PRN to help with auto-peep when present  - would transition to bactrim given sensitivities from sputum culture   - would ensure she has aerobika with her at home when she is stable for discahrge   - would benefit from nebulized medications over her home inhalers given her inability to generate sufficient NIF for her maintanence porfirio meds   - Maintain spo2 > 88% 82 year old admitted for increased work of breathing at PT and pulmonary called for continued cough. Patient being treated for COPD exacerbation with visible hyperinflation of the lungs on CXR    Outpatient pulm: Dr. Johnson  Meds: Breo, montelukast  PFTS: FEV1/FVC 49, FEV1 50%, FVC 77%,  %, %, DLCO 63%    COPD exacerbation  Hyperinflation  Cough    Patient chest imaging shows clear hyperinflation of the lungs with some small visible dilated airways. Patient likely has poor clearance of secretions in  the setting of bronchiectasis from her chronic obstructive airways disease. Agree with asteroids and would give duonebs standing as well as airway clearance with aerobika. She has strong cough so does not need hypertonic saline but has very poor movement of air likely from impacted airways with mucus which aerobika will mobilize. Preliminary sputum culture with GPC, GNR, GPR. moderate WBCs with speciation showing staph. aureus now on NC.  - c/w steroids, can transition to PO prednisone 40mg anticipate slow taper given still wheezing on exam  - c/w duonebs q4hr  - Should be on NIV overnight or PRN to help with auto-peep when present  - would transition to appropriate antimicrobial regimen given sputum culture and sensitivities    - would ensure she has aerobika with her at home when she is stable for discahrge   - would benefit from nebulized medications over her home inhalers given her inability to generate sufficient NIF for her maintanence porfirio meds   - Maintain spo2 > 88% 82 year old admitted for increased work of breathing at PT and pulmonary called for continued cough. Patient being treated for COPD exacerbation with visible hyperinflation of the lungs on CXR    Outpatient pulm: Dr. Elizabeth Ramírez: Breo, montelukast  PFTS: FEV1/FVC 49, FEV1 50%, FVC 77%,  %, %, DLCO 63%    COPD exacerbation  Hyperinflation  Cough    Patient chest imaging shows clear hyperinflation of the lungs with some small visible dilated airways. Patient likely has poor clearance of secretions in  the setting of bronchiectasis from her chronic obstructive airways disease. Agree with asteroids and would give duonebs standing as well as airway clearance with aerobika. She has strong cough so does not need hypertonic saline but has very poor movement of air likely from impacted airways with mucus which aerobika will mobilize. Preliminary sputum culture with GPC, GNR, GPR. moderate WBCs with speciation showing staph. aureus now on NC.  - c/w steroids, can transition to PO prednisone 40mg anticipate slow taper given still wheezing on exam; can give 40 mg for 5 days followed by decrease in dose by 10 mgs every 3 days until completed  - c/w duonebs q4hr  - Should be on NIV overnight or PRN to help with auto-peep when present; currently not tolerating but discussed if she continues to have recurrent admissions this would need to be readdressed   - would transition to appropriate antimicrobial regimen given sputum culture and sensitivities    - would ensure she has aerobika with her at home when she is stable for discahrge   - would benefit from nebulized medications over her home inhalers given her inability to generate sufficient NIF for her maintanence porfirio meds; can resume home trelegy on DC but would discuss with her outpatient pulmonologist switching to nebulized LAMA/LABA/ICS. She has a nebulizer machine already at home    - Maintain spo2 > 88%

## 2023-06-02 ENCOUNTER — TRANSCRIPTION ENCOUNTER (OUTPATIENT)
Age: 83
End: 2023-06-02

## 2023-06-02 VITALS
SYSTOLIC BLOOD PRESSURE: 130 MMHG | OXYGEN SATURATION: 93 % | HEART RATE: 99 BPM | TEMPERATURE: 98 F | DIASTOLIC BLOOD PRESSURE: 75 MMHG | RESPIRATION RATE: 18 BRPM

## 2023-06-02 LAB — GLUCOSE BLDC GLUCOMTR-MCNC: 113 MG/DL — HIGH (ref 70–99)

## 2023-06-02 PROCEDURE — 0225U NFCT DS DNA&RNA 21 SARSCOV2: CPT

## 2023-06-02 PROCEDURE — 80053 COMPREHEN METABOLIC PANEL: CPT

## 2023-06-02 PROCEDURE — 96374 THER/PROPH/DIAG INJ IV PUSH: CPT

## 2023-06-02 PROCEDURE — 85610 PROTHROMBIN TIME: CPT

## 2023-06-02 PROCEDURE — 86850 RBC ANTIBODY SCREEN: CPT

## 2023-06-02 PROCEDURE — 99285 EMERGENCY DEPT VISIT HI MDM: CPT | Mod: 25

## 2023-06-02 PROCEDURE — 82962 GLUCOSE BLOOD TEST: CPT

## 2023-06-02 PROCEDURE — 85025 COMPLETE CBC W/AUTO DIFF WBC: CPT

## 2023-06-02 PROCEDURE — 96375 TX/PRO/DX INJ NEW DRUG ADDON: CPT

## 2023-06-02 PROCEDURE — 83605 ASSAY OF LACTIC ACID: CPT

## 2023-06-02 PROCEDURE — 87070 CULTURE OTHR SPECIMN AEROBIC: CPT

## 2023-06-02 PROCEDURE — 87641 MR-STAPH DNA AMP PROBE: CPT

## 2023-06-02 PROCEDURE — 83880 ASSAY OF NATRIURETIC PEPTIDE: CPT

## 2023-06-02 PROCEDURE — 83540 ASSAY OF IRON: CPT

## 2023-06-02 PROCEDURE — 85730 THROMBOPLASTIN TIME PARTIAL: CPT

## 2023-06-02 PROCEDURE — 86901 BLOOD TYPING SEROLOGIC RH(D): CPT

## 2023-06-02 PROCEDURE — 83735 ASSAY OF MAGNESIUM: CPT

## 2023-06-02 PROCEDURE — 86900 BLOOD TYPING SEROLOGIC ABO: CPT

## 2023-06-02 PROCEDURE — 82570 ASSAY OF URINE CREATININE: CPT

## 2023-06-02 PROCEDURE — 99239 HOSP IP/OBS DSCHRG MGMT >30: CPT

## 2023-06-02 PROCEDURE — 93005 ELECTROCARDIOGRAM TRACING: CPT

## 2023-06-02 PROCEDURE — 83550 IRON BINDING TEST: CPT

## 2023-06-02 PROCEDURE — 84484 ASSAY OF TROPONIN QUANT: CPT

## 2023-06-02 PROCEDURE — 87186 SC STD MICRODIL/AGAR DIL: CPT

## 2023-06-02 PROCEDURE — 97116 GAIT TRAINING THERAPY: CPT

## 2023-06-02 PROCEDURE — 81001 URINALYSIS AUTO W/SCOPE: CPT

## 2023-06-02 PROCEDURE — 84540 ASSAY OF URINE/UREA-N: CPT

## 2023-06-02 PROCEDURE — 94640 AIRWAY INHALATION TREATMENT: CPT

## 2023-06-02 PROCEDURE — 82803 BLOOD GASES ANY COMBINATION: CPT

## 2023-06-02 PROCEDURE — 84300 ASSAY OF URINE SODIUM: CPT

## 2023-06-02 PROCEDURE — 84100 ASSAY OF PHOSPHORUS: CPT

## 2023-06-02 PROCEDURE — 94660 CPAP INITIATION&MGMT: CPT

## 2023-06-02 PROCEDURE — 99233 SBSQ HOSP IP/OBS HIGH 50: CPT | Mod: GC

## 2023-06-02 PROCEDURE — 82728 ASSAY OF FERRITIN: CPT

## 2023-06-02 PROCEDURE — 85027 COMPLETE CBC AUTOMATED: CPT

## 2023-06-02 PROCEDURE — 87640 STAPH A DNA AMP PROBE: CPT

## 2023-06-02 PROCEDURE — 97161 PT EVAL LOW COMPLEX 20 MIN: CPT

## 2023-06-02 PROCEDURE — 36415 COLL VENOUS BLD VENIPUNCTURE: CPT

## 2023-06-02 PROCEDURE — 80048 BASIC METABOLIC PNL TOTAL CA: CPT

## 2023-06-02 PROCEDURE — 71045 X-RAY EXAM CHEST 1 VIEW: CPT

## 2023-06-02 RX ADMIN — Medication 100 MILLIGRAM(S): at 11:13

## 2023-06-02 RX ADMIN — Medication 3 MILLILITER(S): at 11:13

## 2023-06-02 RX ADMIN — Medication 3 MILLILITER(S): at 05:52

## 2023-06-02 RX ADMIN — Medication 40 MILLIGRAM(S): at 07:16

## 2023-06-02 RX ADMIN — BUDESONIDE AND FORMOTEROL FUMARATE DIHYDRATE 2 PUFF(S): 160; 4.5 AEROSOL RESPIRATORY (INHALATION) at 10:26

## 2023-06-02 RX ADMIN — POLYETHYLENE GLYCOL 3350 17 GRAM(S): 17 POWDER, FOR SOLUTION ORAL at 11:13

## 2023-06-02 RX ADMIN — HEPARIN SODIUM 5000 UNIT(S): 5000 INJECTION INTRAVENOUS; SUBCUTANEOUS at 05:52

## 2023-06-02 RX ADMIN — Medication 240 MILLIGRAM(S): at 05:53

## 2023-06-02 RX ADMIN — SERTRALINE 50 MILLIGRAM(S): 25 TABLET, FILM COATED ORAL at 11:13

## 2023-06-02 NOTE — PROGRESS NOTE ADULT - ASSESSMENT
82 year old admitted for increased work of breathing at PT and pulmonary called for continued cough. Patient being treated for COPD exacerbation with visible hyperinflation of the lungs on CXR    Outpatient pulm: Dr. Elizabeth Ramírez: Breo, montelukast  PFTS: FEV1/FVC 49, FEV1 50%, FVC 77%,  %, %, DLCO 63%    COPD exacerbation  Hyperinflation  Cough  MSSA PNA    Patient chest imaging shows clear hyperinflation of the lungs with some small visible dilated airways. Patient likely has poor clearance of secretions in  the setting of bronchiectasis from her chronic obstructive airways disease. Agree with asteroids and would give duonebs standing as well as airway clearance with aerobika. She has strong cough so does not need hypertonic saline but has very poor movement of air likely from impacted airways with mucus which aerobika will mobilize. Preliminary sputum culture with GPC, GNR, GPR. moderate WBCs with speciation showing staph. aureus now back on RA.   - c/w steroids, PO prednisone 40mg with slow taper; can give 40 mg for 5 days followed by decrease in dose by 10 mgs every 3 days until completed  - c/w duonebs q4hr  - Should be on NIV overnight or PRN to help with auto-peep when present; currently not tolerating but discussed if she continues to have recurrent admissions this would need to be readdressed   - would transition to appropriate antimicrobial regimen given sputum culture and sensitivities    - would ensure she has aerobika with her at home when she is stable for discahrge   - would benefit from nebulized medications over her home inhalers given her inability to generate sufficient NIF for her maintanence porfirio meds; can resume home trelegy on DC but would discuss with her outpatient pulmonologist switching to nebulized LAMA/LABA/ICS. She has a nebulizer machine already at home    - Maintain spo2 > 88%  - Needs follow up with her outpatient pulmonologist on DC for follow up on above

## 2023-06-02 NOTE — DISCHARGE NOTE NURSING/CASE MANAGEMENT/SOCIAL WORK - PATIENT PORTAL LINK FT
You can access the FollowMyHealth Patient Portal offered by [facility] by registering at the following website: [email or web address]. By joining [facility]’s FollowMyHealth portal, you will also be able to view your health information using other applications (apps) compatible with our system.

## 2023-06-02 NOTE — PROGRESS NOTE ADULT - PROVIDER SPECIALTY LIST ADULT
Internal Medicine
Pulmonology
Internal Medicine
Pulmonology
Rehab Medicine
Pulmonology
Rehab Medicine
Pulmonology
Internal Medicine
Hospitalist
Internal Medicine

## 2023-06-02 NOTE — PROGRESS NOTE ADULT - SUBJECTIVE AND OBJECTIVE BOX
PULMONARY CONSULT SERVICE FOLLOW-UP NOTE    INTERVAL HPI:  Reviewed chart and overnight events; patient seen and examined at bedside. Feels much better, back to baseline. Pending DC home today per patient and confirmed with primary team - awaiting set up of home services. No fevers or chills. ROS otherwise negative.     MEDICATIONS:  Pulmonary:  albuterol    90 MICROgram(s) HFA Inhaler 2 Puff(s) Inhalation every 6 hours PRN  albuterol/ipratropium for Nebulization 3 milliLiter(s) Nebulizer every 4 hours  budesonide  80 MICROgram(s)/formoterol 4.5 MICROgram(s) Inhaler 2 Puff(s) Inhalation every 12 hours  guaiFENesin Oral Liquid (Sugar-Free) 100 milliGRAM(s) Oral every 6 hours PRN    Antimicrobials:  doxycycline monohydrate Capsule 100 milliGRAM(s) Oral every 12 hours    Anticoagulants:  heparin   Injectable 5000 Unit(s) SubCutaneous every 8 hours    Cardiac:  verapamil  milliGRAM(s) Oral every 24 hours  verapamil  milliGRAM(s) Oral every 24 hours      Allergies    penicillin (Unknown)    Intolerances        Vital Signs Last 24 Hrs  T(C): 36.6 (02 Jun 2023 05:35), Max: 37 (01 Jun 2023 20:47)  T(F): 97.9 (02 Jun 2023 05:35), Max: 98.6 (01 Jun 2023 20:47)  HR: 84 (02 Jun 2023 06:14) (79 - 97)  BP: 153/91 (02 Jun 2023 05:35) (138/82 - 153/91)  BP(mean): --  RR: 20 (02 Jun 2023 06:14) (15 - 20)  SpO2: 96% (02 Jun 2023 06:14) (91% - 96%)    Parameters below as of 02 Jun 2023 06:14  Patient On (Oxygen Delivery Method): room air            PHYSICAL EXAM:  Constitutional: well-appearing  HEENT: NC/AT; PERRL, anicteric sclera; MMM  Neck: supple  Cardiovascular: +S1/S2, RRR  Respiratory: poor air entry b/l, no wheezing   Gastrointestinal: soft, NT/ND  Extremities: WWP; no edema, clubbing or cyanosis  Vascular: 2+ radial pulses B/L  Neurological: awake and alert; HOLLIS    LABS:      CBC Full  -  ( 01 Jun 2023 05:30 )  WBC Count : 7.74 K/uL  RBC Count : 3.35 M/uL  Hemoglobin : 9.6 g/dL  Hematocrit : 30.7 %  Platelet Count - Automated : 203 K/uL  Mean Cell Volume : 91.6 fl  Mean Cell Hemoglobin : 28.7 pg  Mean Cell Hemoglobin Concentration : 31.3 gm/dL  Auto Neutrophil # : x  Auto Lymphocyte # : x  Auto Monocyte # : x  Auto Eosinophil # : x  Auto Basophil # : x  Auto Neutrophil % : x  Auto Lymphocyte % : x  Auto Monocyte % : x  Auto Eosinophil % : x  Auto Basophil % : x    06-01    138  |  104  |  53<H>  ----------------------------<  170<H>  5.0   |  23  |  1.35<H>    Ca    10.1      01 Jun 2023 05:30  Phos  3.6     06-01  Mg     2.4     06-01                        RADIOLOGY & ADDITIONAL STUDIES:

## 2023-06-09 DIAGNOSIS — E86.1 HYPOVOLEMIA: ICD-10-CM

## 2023-06-09 DIAGNOSIS — I10 ESSENTIAL (PRIMARY) HYPERTENSION: ICD-10-CM

## 2023-06-09 DIAGNOSIS — D63.8 ANEMIA IN OTHER CHRONIC DISEASES CLASSIFIED ELSEWHERE: ICD-10-CM

## 2023-06-09 DIAGNOSIS — N17.9 ACUTE KIDNEY FAILURE, UNSPECIFIED: ICD-10-CM

## 2023-06-09 DIAGNOSIS — R41.3 OTHER AMNESIA: ICD-10-CM

## 2023-06-09 DIAGNOSIS — R06.02 SHORTNESS OF BREATH: ICD-10-CM

## 2023-06-09 DIAGNOSIS — E86.0 DEHYDRATION: ICD-10-CM

## 2023-06-09 DIAGNOSIS — J45.901 UNSPECIFIED ASTHMA WITH (ACUTE) EXACERBATION: ICD-10-CM

## 2023-06-09 DIAGNOSIS — J15.211 PNEUMONIA DUE TO METHICILLIN SUSCEPTIBLE STAPHYLOCOCCUS AUREUS: ICD-10-CM

## 2023-06-09 DIAGNOSIS — E78.5 HYPERLIPIDEMIA, UNSPECIFIED: ICD-10-CM

## 2023-06-09 DIAGNOSIS — Z88.0 ALLERGY STATUS TO PENICILLIN: ICD-10-CM

## 2023-06-09 DIAGNOSIS — F32.9 MAJOR DEPRESSIVE DISORDER, SINGLE EPISODE, UNSPECIFIED: ICD-10-CM

## 2023-06-09 DIAGNOSIS — J06.9 ACUTE UPPER RESPIRATORY INFECTION, UNSPECIFIED: ICD-10-CM

## 2023-06-09 DIAGNOSIS — I47.1 SUPRAVENTRICULAR TACHYCARDIA: ICD-10-CM

## 2023-06-09 DIAGNOSIS — J96.01 ACUTE RESPIRATORY FAILURE WITH HYPOXIA: ICD-10-CM

## 2023-06-09 DIAGNOSIS — J43.9 EMPHYSEMA, UNSPECIFIED: ICD-10-CM

## 2023-06-20 RX ORDER — VERAPAMIL HCL 240 MG
1 CAPSULE, EXTENDED RELEASE PELLETS 24 HR ORAL
Refills: 0 | DISCHARGE

## 2023-06-20 RX ORDER — ALBUTEROL 90 UG/1
2 AEROSOL, METERED ORAL
Qty: 0 | Refills: 0 | DISCHARGE

## 2023-06-20 RX ORDER — VERAPAMIL HCL 240 MG
0 CAPSULE, EXTENDED RELEASE PELLETS 24 HR ORAL
Refills: 0 | DISCHARGE

## 2023-06-20 RX ORDER — FLUTICASONE FUROATE, UMECLIDINIUM BROMIDE AND VILANTEROL TRIFENATATE 200; 62.5; 25 UG/1; UG/1; UG/1
1 POWDER RESPIRATORY (INHALATION)
Refills: 0 | DISCHARGE

## 2023-06-20 RX ORDER — DONEPEZIL HYDROCHLORIDE 10 MG/1
1 TABLET, FILM COATED ORAL
Refills: 0 | DISCHARGE

## 2023-06-20 RX ORDER — SERTRALINE 25 MG/1
1 TABLET, FILM COATED ORAL
Refills: 0 | DISCHARGE

## 2023-06-20 RX ORDER — ROSUVASTATIN CALCIUM 5 MG/1
1 TABLET ORAL
Refills: 0 | DISCHARGE

## 2023-06-20 RX ORDER — MEMANTINE HYDROCHLORIDE 10 MG/1
1 TABLET ORAL
Refills: 0 | DISCHARGE

## 2025-03-17 NOTE — ED ADULT NURSE NOTE - BREATH SOUNDS, MLM
Mild scattered wheezes bilaterally/Wheezes Chest Pain    I suspect that your chest pain was caused by acid reflux.  The blood work that we did was normal.  We checked for heart attack, blood clot (oulmonary embolism), pneumonia and collapsed lung.  All of this was normal.  You are slightly anemic as we discussed.  Please consider starting on acid blocker.  I have sent a prescription for pantoprazole [Protonix] to the pharmacy    Chest pain can be caused by many different conditions which may or may not be dangerous. Causes include heartburn, lung infections, heart attack, blood clot in lungs, skin infections, strain or damage to muscle, cartilage, or bones, etc. In addition to a history and physical examination, an electrocardiogram (ECG) or other lab tests may have been performed to determine the cause of your chest pain. Follow up with your primary care provider or with a cardiologist as instructed.     SEEK IMMEDIATE MEDICAL CARE IF YOU HAVE ANY OF THE FOLLOWING SYMPTOMS: worsening chest pain, coughing up blood, unexplained back/neck/jaw pain, severe abdominal pain, dizziness or lightheadedness, fainting, shortness of breath, sweaty or clammy skin, vomiting, or racing heart beat. These symptoms may represent a serious problem that is an emergency. Do not wait to see if the symptoms will go away. Get medical help right away. Call 911 and do not drive yourself to the hospital.